# Patient Record
Sex: MALE | Race: WHITE | NOT HISPANIC OR LATINO | Employment: FULL TIME | URBAN - METROPOLITAN AREA
[De-identification: names, ages, dates, MRNs, and addresses within clinical notes are randomized per-mention and may not be internally consistent; named-entity substitution may affect disease eponyms.]

---

## 2017-01-27 DIAGNOSIS — Z00.00 ROUTINE GENERAL MEDICAL EXAMINATION AT A HEALTH CARE FACILITY: Primary | ICD-10-CM

## 2017-03-08 ENCOUNTER — CLINICAL SUPPORT (OUTPATIENT)
Dept: INTERNAL MEDICINE | Facility: CLINIC | Age: 32
End: 2017-03-08

## 2017-03-08 ENCOUNTER — CLINICAL SUPPORT (OUTPATIENT)
Dept: AUDIOLOGY | Facility: CLINIC | Age: 32
End: 2017-03-08
Payer: COMMERCIAL

## 2017-03-08 ENCOUNTER — OFFICE VISIT (OUTPATIENT)
Dept: INTERNAL MEDICINE | Facility: CLINIC | Age: 32
End: 2017-03-08
Payer: COMMERCIAL

## 2017-03-08 ENCOUNTER — CLINICAL SUPPORT (OUTPATIENT)
Dept: CARDIOLOGY | Facility: CLINIC | Age: 32
End: 2017-03-08
Payer: COMMERCIAL

## 2017-03-08 ENCOUNTER — PROCEDURE VISIT (OUTPATIENT)
Dept: PULMONOLOGY | Facility: CLINIC | Age: 32
End: 2017-03-08
Payer: COMMERCIAL

## 2017-03-08 ENCOUNTER — HOSPITAL ENCOUNTER (OUTPATIENT)
Dept: RADIOLOGY | Facility: HOSPITAL | Age: 32
Discharge: HOME | End: 2017-03-08
Attending: FAMILY MEDICINE
Payer: COMMERCIAL

## 2017-03-08 VITALS
TEMPERATURE: 98 F | WEIGHT: 253.5 LBS | RESPIRATION RATE: 18 BRPM | DIASTOLIC BLOOD PRESSURE: 68 MMHG | HEART RATE: 78 BPM | SYSTOLIC BLOOD PRESSURE: 122 MMHG | BODY MASS INDEX: 34.34 KG/M2 | HEIGHT: 72 IN

## 2017-03-08 VITALS
WEIGHT: 253 LBS | HEART RATE: 78 BPM | SYSTOLIC BLOOD PRESSURE: 122 MMHG | BODY MASS INDEX: 34.27 KG/M2 | RESPIRATION RATE: 18 BRPM | DIASTOLIC BLOOD PRESSURE: 68 MMHG | HEIGHT: 72 IN

## 2017-03-08 DIAGNOSIS — Z00.00 ROUTINE GENERAL MEDICAL EXAMINATION AT A HEALTH CARE FACILITY: ICD-10-CM

## 2017-03-08 DIAGNOSIS — Z00.00 ROUTINE GENERAL MEDICAL EXAMINATION AT A HEALTH CARE FACILITY: Primary | ICD-10-CM

## 2017-03-08 DIAGNOSIS — Z23 NEED FOR PNEUMOCOCCAL VACCINATION: ICD-10-CM

## 2017-03-08 DIAGNOSIS — Z01.12 HEARING CONSERVATION AND TREATMENT EXAM: Primary | ICD-10-CM

## 2017-03-08 DIAGNOSIS — Z23 NEED FOR TDAP VACCINATION: ICD-10-CM

## 2017-03-08 DIAGNOSIS — E66.9 OBESITY (BMI 30.0-34.9): ICD-10-CM

## 2017-03-08 PROBLEM — E66.811 OBESITY (BMI 30.0-34.9): Status: ACTIVE | Noted: 2017-03-08

## 2017-03-08 LAB
ALBUMIN SERPL BCP-MCNC: 4 G/DL
ALP SERPL-CCNC: 95 U/L
ALT SERPL W/O P-5'-P-CCNC: 35 U/L
ANION GAP SERPL CALC-SCNC: 9 MMOL/L
AST SERPL-CCNC: 20 U/L
BILIRUB SERPL-MCNC: 1.5 MG/DL
BILIRUB UR QL STRIP: NEGATIVE
BUN SERPL-MCNC: 15 MG/DL
CALCIUM SERPL-MCNC: 9.6 MG/DL
CHLORIDE SERPL-SCNC: 105 MMOL/L
CHOLEST/HDLC SERPL: 6.4 {RATIO}
CLARITY UR: CLEAR
CO2 SERPL-SCNC: 26 MMOL/L
COLOR UR: YELLOW
CREAT SERPL-MCNC: 1 MG/DL
ERYTHROCYTE [DISTWIDTH] IN BLOOD BY AUTOMATED COUNT: 13.5 %
EST. GFR  (AFRICAN AMERICAN): >60 ML/MIN/1.73 M^2
EST. GFR  (NON AFRICAN AMERICAN): >60 ML/MIN/1.73 M^2
GLUCOSE SERPL-MCNC: 91 MG/DL
GLUCOSE UR QL STRIP: NEGATIVE
HCT VFR BLD AUTO: 46.6 %
HDL/CHOLESTEROL RATIO: 15.6 %
HDLC SERPL-MCNC: 173 MG/DL
HDLC SERPL-MCNC: 27 MG/DL
HGB BLD-MCNC: 16 G/DL
HGB UR QL STRIP: NEGATIVE
KETONES UR QL STRIP: NEGATIVE
LDLC SERPL CALC-MCNC: 95 MG/DL
LEUKOCYTE ESTERASE UR QL STRIP: NEGATIVE
MCH RBC QN AUTO: 29.3 PG
MCHC RBC AUTO-ENTMCNC: 34.3 %
MCV RBC AUTO: 85 FL
NITRITE UR QL STRIP: NEGATIVE
NONHDLC SERPL-MCNC: 146 MG/DL
PH UR STRIP: 7 [PH] (ref 5–8)
PLATELET # BLD AUTO: 249 K/UL
PMV BLD AUTO: 9.3 FL
POTASSIUM SERPL-SCNC: 4.1 MMOL/L
PRE FEF 25 75: 4.25 L/S (ref 3.87–5.56)
PRE FET 100: 8.2 S
PRE FEV1 FVC: 82 %
PRE FEV1: 4.21 L (ref 4.34–5.17)
PRE FIF 50: 3.61 L/S
PRE FVC: 5.12 L (ref 5.35–6.34)
PRE PEF: 8.52 L/S (ref 9.49–11.94)
PREDICTED FEV1 FVC: 82.07 % (ref 77.24–86.91)
PREDICTED FEV1: 4.75 L (ref 4.34–5.17)
PREDICTED FVC: 5.84 L (ref 5.35–6.34)
PROT SERPL-MCNC: 7.8 G/DL
PROT UR QL STRIP: NEGATIVE
PROVOCATION PROTOCOL: ABNORMAL
RBC # BLD AUTO: 5.46 M/UL
SODIUM SERPL-SCNC: 140 MMOL/L
SP GR UR STRIP: 1.01 (ref 1–1.03)
TRIGL SERPL-MCNC: 255 MG/DL
URN SPEC COLLECT METH UR: NORMAL
WBC # BLD AUTO: 5.57 K/UL

## 2017-03-08 PROCEDURE — 90715 TDAP VACCINE 7 YRS/> IM: CPT | Mod: PBBFAC,PO | Performed by: FAMILY MEDICINE

## 2017-03-08 PROCEDURE — 83655 ASSAY OF LEAD: CPT

## 2017-03-08 PROCEDURE — 92552 PURE TONE AUDIOMETRY AIR: CPT | Mod: PBBFAC,PO | Performed by: AUDIOLOGIST

## 2017-03-08 PROCEDURE — 93005 ELECTROCARDIOGRAM TRACING: CPT | Mod: PBBFAC,PO | Performed by: INTERNAL MEDICINE

## 2017-03-08 PROCEDURE — 85027 COMPLETE CBC AUTOMATED: CPT | Mod: PO

## 2017-03-08 PROCEDURE — 86703 HIV-1/HIV-2 1 RESULT ANTBDY: CPT

## 2017-03-08 PROCEDURE — 80053 COMPREHEN METABOLIC PANEL: CPT | Mod: PO

## 2017-03-08 PROCEDURE — 71020 XR CHEST PA AND LATERAL: CPT | Mod: TC,PO

## 2017-03-08 PROCEDURE — 94010 BREATHING CAPACITY TEST: CPT | Mod: PBBFAC,PO

## 2017-03-08 PROCEDURE — 90472 IMMUNIZATION ADMIN EACH ADD: CPT | Mod: PBBFAC,PO | Performed by: FAMILY MEDICINE

## 2017-03-08 PROCEDURE — 93010 ELECTROCARDIOGRAM REPORT: CPT | Mod: S$PBB,,, | Performed by: INTERNAL MEDICINE

## 2017-03-08 PROCEDURE — 81003 URINALYSIS AUTO W/O SCOPE: CPT | Mod: PO

## 2017-03-08 PROCEDURE — 80061 LIPID PANEL: CPT | Mod: PO

## 2017-03-08 PROCEDURE — 99999 PR PBB SHADOW E&M-EST. PATIENT-LVL III: CPT | Mod: PBBFAC,,, | Performed by: FAMILY MEDICINE

## 2017-03-08 PROCEDURE — 90471 IMMUNIZATION ADMIN: CPT | Mod: PBBFAC,PO | Performed by: FAMILY MEDICINE

## 2017-03-08 PROCEDURE — 71020 XR CHEST PA AND LATERAL: CPT | Mod: 26,,, | Performed by: RADIOLOGY

## 2017-03-08 PROCEDURE — 94010 BREATHING CAPACITY TEST: CPT | Mod: 26,S$PBB,, | Performed by: INTERNAL MEDICINE

## 2017-03-08 PROCEDURE — 83036 HEMOGLOBIN GLYCOSYLATED A1C: CPT

## 2017-03-08 PROCEDURE — 99395 PREV VISIT EST AGE 18-39: CPT | Mod: S$PBB,,, | Performed by: FAMILY MEDICINE

## 2017-03-08 NOTE — PROGRESS NOTES
Subjective:   Patient ID: Nato Ng is a 31 y.o. male.  Chief Complaint:  Executive Health    HPI Comments: Presents for executive health evaluation #1.  No PCP.  No subcutaneous past medical history.  No present medications.  ALLERGIC to penicillin caused a rash.  Surgical history for repair of right distal leg/ankle fracture.  Social history for 1 pack per day smoking, no alcohol use, no illicit drug use.  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  Single.  Family history father with multiple skin cancers, not melanoma.  Mother healthy.  Specifically no prostate or colon cancer.  Routine health maintenance with last tetanus 1998.  No flu vaccination.  No pneumonia vaccination.  Complains of sleep difficulty, not adequate.  Fatigued during the day.  Over-the-counter sleep aids no help.  Question will Ambien in past effective.  No previous YUE workup.  Once to quit smoking, but intolerant of Wellbutrin previously.    Review of Systems   Constitutional: Positive for fatigue. Negative for chills and fever.   HENT: Negative for congestion, ear pain, postnasal drip, sinus pressure and sore throat.    Eyes: Negative for visual disturbance.   Respiratory: Negative for cough, chest tightness, shortness of breath and wheezing.    Cardiovascular: Negative for chest pain, palpitations and leg swelling.   Gastrointestinal: Negative for abdominal pain, blood in stool, constipation, diarrhea, nausea and vomiting.   Endocrine: Negative for polydipsia, polyphagia and polyuria.   Genitourinary: Negative for difficulty urinating, dysuria, flank pain, frequency, hematuria and urgency.   Musculoskeletal: Negative for myalgias.   Skin: Negative for rash.   Neurological: Negative for dizziness, weakness, light-headedness and headaches.   Hematological: Negative for adenopathy.   Psychiatric/Behavioral: Positive for sleep disturbance. Negative for agitation, decreased concentration and dysphoric mood. The patient is not nervous/anxious.       Objective:   /68  Pulse 78  Temp 97.8 °F (36.6 °C) (Tympanic)   Resp 18  Ht 6' (1.829 m)  Wt 115 kg (253 lb 8.5 oz)  BMI 34.38 kg/m2     Physical Exam   Constitutional: He is oriented to person, place, and time. He appears well-developed and well-nourished.   BP controlled but obese   HENT:   Right Ear: Hearing, tympanic membrane, external ear and ear canal normal.   Left Ear: Hearing, tympanic membrane, external ear and ear canal normal.   Nose: Nose normal. Right sinus exhibits no maxillary sinus tenderness and no frontal sinus tenderness. Left sinus exhibits no maxillary sinus tenderness and no frontal sinus tenderness.   Mouth/Throat: Uvula is midline, oropharynx is clear and moist and mucous membranes are normal.   Eyes: Conjunctivae are normal. Right eye exhibits no discharge. Left eye exhibits no discharge. Right conjunctiva is not injected. Left conjunctiva is not injected. No scleral icterus.   Neck: Normal range of motion and full passive range of motion without pain. Neck supple. No JVD present. No spinous process tenderness and no muscular tenderness present. Normal range of motion present. No thyroid mass and no thyromegaly present.   Cardiovascular: Normal rate, regular rhythm, normal heart sounds and intact distal pulses.  Exam reveals no gallop and no friction rub.    No murmur heard.  Pulses:       Radial pulses are 2+ on the right side, and 2+ on the left side.   Pulmonary/Chest: Effort normal and breath sounds normal. He has no wheezes. He has no rhonchi. He has no rales.   Abdominal: Soft. He exhibits no distension. There is no tenderness. There is no rebound, no guarding and no CVA tenderness.   Musculoskeletal: He exhibits no edema.        Lumbar back: Normal. He exhibits normal range of motion, no pain and no spasm.   Lymphadenopathy:     He has no cervical adenopathy.   Neurological: He is alert and oriented to person, place, and time.   Skin: Skin is warm and dry. No rash  noted.   Psychiatric: He has a normal mood and affect.   Nursing note and vitals reviewed.    EKG normal sinus rhythm, normal tracing.  No previous tracing for comparison  Chest x-ray normal without any acute pulmonary process.  Audiology exam past  Pulmonary PFTs normal    Assessment:     1. Routine general medical examination at a health care facility    2. Need for Tdap vaccination    3. Need for pneumococcal vaccination    4. Obesity (BMI 30.0-34.9)      Plan:   Routine general medical examination at a health care facility  -     Tdap Vaccine  -     Pneumococcal Polysaccharide Vaccine (23 Valent) (SQ/IM)  Send a full executive health letter and summary when all lab results are available  Advised nicotine replacement for smoking cessation as needed.  No shin 6 with previous intolerance of Wellbutrin.  Advised sleep study for possible YUE based on sleep difficulty and increased body mass index.    Obesity (BMI 30.0-34.9)  Discussed increased BMI, Increased health risks, Need for weight loss, Lifestyle modifications.    Return to clinic one year or sooner as needed.

## 2017-03-08 NOTE — MR AVS SNAPSHOT
[facility] - Internal Medicine  [address] [facility] [address] [place] [address]  Phone: [phone]  Fax: [phone]                  Nato Ng   3/8/2017 10:00 AM   Office Visit    Description:  Male : 1985   Provider:  Quinton Carrasco MD   Department:  [facility] - Internal Medicine           Reason for Visit     Executive Health           Diagnoses this Visit        Comments    Routine general medical examination at a health care facility    -  Primary     Need for Tdap vaccination         Need for pneumococcal vaccination                To Do List           Goals (5 Years of Data)     None      Follow-Up and Disposition     Return in about 1 year (around 3/8/2018) for For Annual Exam.      [facility] On Call     [facility] On Call Nurse [facility] -  Assistance  Registered nurses in the [facility] On Call Center provide clinical advisement, health education, appointment booking, and other advisory services.  Call for this free service at [phone].             Medications                Verify that the below list of medications is an accurate representation of the medications you are currently taking.  If none reported, the list may be blank. If incorrect, please contact your healthcare provider. Carry this list with you in case of emergency.                Clinical Reference Information           Your Vitals Were     BP Pulse Temp Resp Height Weight    122/68 78 97.8 °F (36.6 °C) (Tympanic) 18 6' (1.829 m) 115 kg (253 lb 8.5 oz)    BMI                34.38 kg/m2          Blood Pressure          Most Recent Value    BP  122/68      Allergies as of 3/8/2017     Penicillins      Immunizations Administered on Date of Encounter - 3/8/2017     Name Date Dose VIS Date Route    Pneumococcal Polysaccharide - 23 Valent  Incomplete 0.5 mL 2015 Intramuscular    TDAP  Incomplete 0.5 mL 2015 Intramuscular      Orders Placed During Today's Visit      Normal Orders This Visit    Pneumococcal Polysaccharide Vaccine  (23 Valent) (SQ/IM)     Tdap Vaccine       [facility] Sign-Up     Activating your [facility] account is as easy as 1-2-3!     1) Visit [email or web address], select Sign Up Now, enter this activation code and your date of birth, then select Next.  [email or web address]  Expires: 4/21/2017 12:44 PM      2) Create a username and password to use when you visit [facility] in the future and select a security question in case you lose your password and select Next.    3) Enter your e-mail address and click Sign Up!    Additional Information  If you have questions, please e-mail [email or web address] or call [phone] to talk to our [facility] staff. Remember, [facility] is NOT to be used for urgent needs. For medical emergencies, dial 911.         Smoking Cessation     If you would like to quit smoking:   You may be eligible for free services if you are a [place] resident and started smoking cigarettes before September 1, 1988.  Call the Smoking Cessation Trust ([facility]) toll free at (493) 384-4537 or ([phone]) 345-4610.   Call 9-982-QUIT-NOW if you do not meet the above criteria.            Language Assistance Services     ATTENTION: Language assistance services are available, free of charge. Please call [phone].      ATENCIÓN: Si habla español, tiene a cedillo disposición servicios gratuitos de asistencia lingüística. Llame al [phone].     CHÚ Ý: N?u b?n nói Ti?ng Vi?t, có các d?ch v? h? tr? ngôn ng? mi?n phí dành cho b?n. G?i s? [phone].         [facility] complies with applicable Federal civil rights laws and does not discriminate on the basis of race, color, national origin, age, disability, or sex.

## 2017-03-08 NOTE — PROGRESS NOTES
Executive Health Screening    Nato Ng was seen 03/08/2017 for an executive health hearing screen.  Results revealed normal hearing sensitivity 250-8000 Hz, bilaterally.  Otoscopy was within normal limits, bilaterally.    Recommendations:  1. Wear Hearing Protective Devices around loud noises

## 2017-03-09 LAB
CITY: NORMAL
COUNTY: NORMAL
ESTIMATED AVG GLUCOSE: 114 MG/DL
GUARDIAN FIRST NAME: NORMAL
GUARDIAN LAST NAME: NORMAL
HBA1C MFR BLD HPLC: 5.6 %
HIV 1+2 AB+HIV1 P24 AG SERPL QL IA: NEGATIVE
LEAD BLD-MCNC: <1 MCG/DL (ref 0–4.9)
PHONE #: NORMAL
POSTAL CODE: NORMAL
RACE: NORMAL
SPECIMEN SOURCE: NORMAL
STATE OF RESIDENCE: NORMAL
STREET ADDRESS: NORMAL

## 2017-03-10 ENCOUNTER — TELEPHONE (OUTPATIENT)
Dept: INTERNAL MEDICINE | Facility: CLINIC | Age: 32
End: 2017-03-10

## 2017-03-10 NOTE — TELEPHONE ENCOUNTER
----- Message from Maci Patiño sent at 3/10/2017 12:08 PM CST -----  Contact: [phone]   Returning call,please call @[phone].  Thanks-[facility]

## 2018-01-31 ENCOUNTER — OFFICE VISIT (OUTPATIENT)
Dept: ALLERGY | Facility: CLINIC | Age: 33
End: 2018-01-31
Payer: COMMERCIAL

## 2018-01-31 VITALS
TEMPERATURE: 98 F | HEART RATE: 74 BPM | DIASTOLIC BLOOD PRESSURE: 80 MMHG | HEIGHT: 71 IN | BODY MASS INDEX: 37.16 KG/M2 | SYSTOLIC BLOOD PRESSURE: 130 MMHG | WEIGHT: 265.44 LBS | RESPIRATION RATE: 15 BRPM

## 2018-01-31 DIAGNOSIS — J30.89 CHRONIC NONSEASONAL ALLERGIC RHINITIS DUE TO OTHER ALLERGEN: ICD-10-CM

## 2018-01-31 DIAGNOSIS — J31.0 OTHER CHRONIC RHINITIS: Primary | ICD-10-CM

## 2018-01-31 PROCEDURE — 99204 OFFICE O/P NEW MOD 45 MIN: CPT | Mod: 25,S$GLB,, | Performed by: ALLERGY & IMMUNOLOGY

## 2018-01-31 PROCEDURE — 3008F BODY MASS INDEX DOCD: CPT | Mod: S$GLB,,, | Performed by: ALLERGY & IMMUNOLOGY

## 2018-01-31 PROCEDURE — 95004 PERQ TESTS W/ALRGNC XTRCS: CPT | Mod: S$GLB,,, | Performed by: ALLERGY & IMMUNOLOGY

## 2018-01-31 PROCEDURE — 99999 PR PBB SHADOW E&M-EST. PATIENT-LVL III: CPT | Mod: PBBFAC,,, | Performed by: ALLERGY & IMMUNOLOGY

## 2018-01-31 RX ORDER — AZELASTINE 1 MG/ML
1 SPRAY, METERED NASAL 2 TIMES DAILY
Qty: 30 ML | Refills: 6 | Status: SHIPPED | OUTPATIENT
Start: 2018-01-31 | End: 2018-04-05 | Stop reason: ALTCHOICE

## 2018-01-31 RX ORDER — CROMOLYN SODIUM 5.2 MG/ML
SPRAY, METERED NASAL
Qty: 1 BOTTLE | Refills: 6 | Status: SHIPPED | OUTPATIENT
Start: 2018-01-31 | End: 2018-04-05

## 2018-02-01 NOTE — PROGRESS NOTES
Chief complaint: Concerned about possible dog ALLERGY    This note was dictated using voice recognition software and may contain errors.    History:    He had a 3 PM appointment on Wednesday, January 31, 2018.  Information in his medical record regarding his past medical history family history and social history was reviewed and updated today.  Significant additions if any are as noted below.    He stated that he has at least a 10 year history of seasonal, spring and fall, hayfever symptoms.  He experiences nasal obstruction and posterior rhinorrhea.  Typically he does not experience that much sneezing, itching of the nose or eyes palate and pharynx.    He is concerned that he may be ALLERGIC to dogs.  In the past he was concerned that he might be ALLERGIC to cats and horses or freshly cut grass or hay.    In the past when he is been in environments were some dogs are present he is developed increase in nasal symptoms.  He stated within the past few days he had purchased a dog, a puppy.  He is concerned that his nasal symptoms are worsening since acquiring the dog.  He is able to touch the dog and does not develop any dermatitis hives or itching of the skin.    He does not believe that he is ALLERGIC to any foods.    Past medical history he stated in childhood he had asthma.  Doesn't adult this is not been a problem.  He has no history of nose or sinus surgery or facial fractures.  He has no history of heart liver or kidney thyroid disease hypertension or diabetes.    Family history is negative for hayfever and asthma.    Social history.  He smokes but not on a daily basis.  He lives in a mobile home which has central heating and cooling.  He has carpeting in his bedroom.  He works as a .    Review of systems: See above.  At the time of his appointment this afternoon he is feeling well in general.  He stated in the past he may have taken Singulair.  He stated that after cutting grass he will take Benadryl  and finds it to be helpful.  It may cause some sedation.    Exam:    In general he is in no distress.  He is alert oriented well-developed in good mood and attentive  Gait steady  Skin no rash noted  Head no swelling noted  Eyes sclera white conjunctiva pink  Nose patent no polyps seen  Mouth no swelling of the lips tongue or in the throat noted  Ears not inflamed tympanic membranes not inflamed  Lymph nodes no epitrochlear or cervical lymphadenopathy noted  Neck no thyromegaly or masses noted  Heart regular rhythm no murmurs heard  Lungs clear to auscultation  Extremities no swelling or inflammation of the hands or legs noted    He is interested in pursuing ALLERGY evaluation.  He elected to have immediate hypersensitivity skin tests performed.    ALLERGY testing:    Diagnostic immediate hypersensitivity prick skin tests were performed on the volar forearms.  These tests were personally evaluated and interpreted by myself.  A histamine control was positive.  The saline control was negative.  A modest reaction was noted for cockroach protein.  A very equivocal reaction was possibly noted for dog.  The other 34 allergens tested were negative.  I reviewed the results of the tests with him.    Impression:    #1 ALLERGIC rhinitis  #2 chronic rhinitis  #3 other health concerns as noted in his medical record    Assessment and plan:    Using anatomical teaching models of the nose and had I reviewed anatomy and pathophysiology with him.  We discussed the fact that the nose is an organ.  We discussed ALLERGIC rhinitis.  We discussed nonallergic rhinitis.    He was instructed in the proper technique for using a nasal spray.    Initially I have suggested he evaluate the use of Astelin nasal spray 1 spray in each nasal passage as often as every 12 hours if needed.  Potential side effects were reviewed.    Prior to cutting grass he may evaluate the use of Nasalcrom nasal spray.    At his request a prescription for Astelin was  issued and was transmitted electronically to his pharmacy.  He was given a printed prescription for Nasalcrom.    I requested that he call in approximately 1 week and inform me as to whether or not Astelin was helpful and tolerated.  If Astelin is not found to be helpful I told him Singulair may be evaluated.    I reviewed with him that exposure to smoke could provoke respiratory symptoms.  I told him that smoke exposure induced symptoms may be nonallergic in nature.    I reviewed with him the treatment of choice for animal ALLERGY is avoidance.  He will continue to observe as to whether or not he believes that exposure to his puppy is provoking him to be symptomatic.  He stated that he understands that if he continues to be symptomatic due to his exposure to his dog that he would be willing to find  a new home for the dog.    I recommended that he stop smoking.  I encouraged him to do so.    His appointment was 45 minutes in duration spent entirely in face-to-face contact.  More than 50% of the visit was spent in counseling and coordination of care.  An additional 15 minutes were required for the performance and evaluation of his immediate hypersensitivity skin tests.    He was given the office phone number.  Should he have additional questions or concerns he was instructed to call.

## 2018-03-23 ENCOUNTER — OFFICE VISIT (OUTPATIENT)
Dept: FAMILY MEDICINE | Facility: CLINIC | Age: 33
End: 2018-03-23
Payer: COMMERCIAL

## 2018-03-23 VITALS
SYSTOLIC BLOOD PRESSURE: 124 MMHG | TEMPERATURE: 98 F | HEIGHT: 72 IN | DIASTOLIC BLOOD PRESSURE: 86 MMHG | HEART RATE: 87 BPM | BODY MASS INDEX: 35.95 KG/M2 | OXYGEN SATURATION: 98 % | WEIGHT: 265.44 LBS

## 2018-03-23 DIAGNOSIS — J30.1 CHRONIC SEASONAL ALLERGIC RHINITIS DUE TO POLLEN: ICD-10-CM

## 2018-03-23 DIAGNOSIS — J06.9 UPPER RESPIRATORY TRACT INFECTION, UNSPECIFIED TYPE: Primary | ICD-10-CM

## 2018-03-23 PROCEDURE — 90686 IIV4 VACC NO PRSV 0.5 ML IM: CPT | Mod: S$GLB,,, | Performed by: FAMILY MEDICINE

## 2018-03-23 PROCEDURE — 90471 IMMUNIZATION ADMIN: CPT | Mod: 59,S$GLB,, | Performed by: FAMILY MEDICINE

## 2018-03-23 PROCEDURE — 96372 THER/PROPH/DIAG INJ SC/IM: CPT | Mod: S$GLB,,, | Performed by: FAMILY MEDICINE

## 2018-03-23 PROCEDURE — 99999 PR PBB SHADOW E&M-EST. PATIENT-LVL IV: CPT | Mod: PBBFAC,,, | Performed by: FAMILY MEDICINE

## 2018-03-23 PROCEDURE — 99213 OFFICE O/P EST LOW 20 MIN: CPT | Mod: 25,S$GLB,, | Performed by: FAMILY MEDICINE

## 2018-03-23 RX ORDER — PROMETHAZINE HYDROCHLORIDE AND DEXTROMETHORPHAN HYDROBROMIDE 6.25; 15 MG/5ML; MG/5ML
5 SYRUP ORAL 3 TIMES DAILY PRN
Qty: 118 ML | Refills: 0 | Status: SHIPPED | OUTPATIENT
Start: 2018-03-23 | End: 2018-04-02

## 2018-03-23 RX ORDER — BETAMETHASONE SODIUM PHOSPHATE AND BETAMETHASONE ACETATE 3; 3 MG/ML; MG/ML
6 INJECTION, SUSPENSION INTRA-ARTICULAR; INTRALESIONAL; INTRAMUSCULAR; SOFT TISSUE
Status: COMPLETED | OUTPATIENT
Start: 2018-03-23 | End: 2018-03-23

## 2018-03-23 RX ORDER — METHYLPREDNISOLONE 4 MG/1
4 TABLET ORAL DAILY
Qty: 10 TABLET | Refills: 0 | Status: SHIPPED | OUTPATIENT
Start: 2018-03-23 | End: 2018-04-02

## 2018-03-23 RX ADMIN — BETAMETHASONE SODIUM PHOSPHATE AND BETAMETHASONE ACETATE 6 MG: 3; 3 INJECTION, SUSPENSION INTRA-ARTICULAR; INTRALESIONAL; INTRAMUSCULAR; SOFT TISSUE at 09:03

## 2018-03-23 NOTE — PROGRESS NOTES
"Subjective:       Patient ID: Nato Ng is a 32 y.o. male.    Chief Complaint: Nasal Congestion      HPI Comments:       Current Outpatient Prescriptions:     azelastine (ASTELIN) 137 mcg (0.1 %) nasal spray, 1 spray (137 mcg total) by Nasal route 2 (two) times daily., Disp: 30 mL, Rfl: 6    cromolyn (NASALCHROM) 5.2 mg/spray (4 %) nasal spray, Use as directed prior to allergen exposure, Disp: 1 Bottle, Rfl: 6    methylPREDNISolone (MEDROL) 4 MG Tab, Take 1 tablet (4 mg total) by mouth once daily., Disp: 10 tablet, Rfl: 0    promethazine-dextromethorphan (PROMETHAZINE-DM) 6.25-15 mg/5 mL Syrp, Take 5 mLs by mouth 3 (three) times daily as needed., Disp: 118 mL, Rfl: 0    Current Facility-Administered Medications:     betamethasone acetate-betamethasone sodium phosphate injection 6 mg, 6 mg, Intramuscular, 1 time in Clinic/HOD, Domingo Cohn MD      This my first time seeing this patient, who has a history of ALLERGIC rhinitis and who works outdoors.    Complains of 7-10 day history of postnasal drip, nasal congestion, and dry cough.  No fevers, chills, body aches.  No GI symptoms.  Occasional smoker.  Taking some kind of over-the-counter "congestion medication".  Feels fatigued and "under the weather".  Also had some exposures at work to people with flu and would like a flu vaccine    Saw allergist 1 month ago and was put on Astelin spray, and given a prescription for Nasalcrom if needed.  The former did not seem to help much and he did not try the latter.  Subsequently got sick as described above.      Review of Systems   Constitutional: Positive for fatigue. Negative for activity change, appetite change and fever.   HENT: Positive for congestion, postnasal drip and rhinorrhea. Negative for sore throat.    Respiratory: Positive for cough. Negative for shortness of breath.    Cardiovascular: Negative for chest pain.   Gastrointestinal: Negative for abdominal pain, diarrhea and nausea.   Genitourinary: " Negative for difficulty urinating.   Musculoskeletal: Negative for arthralgias and myalgias.   Neurological: Negative for dizziness and headaches.       Objective:      Vitals:    03/23/18 0848   BP: 124/86   Pulse: 87   Temp: 97.6 °F (36.4 °C)   TempSrc: Tympanic   SpO2: 98%   Weight: 120.4 kg (265 lb 6.9 oz)   Height: 6' (1.829 m)   PainSc: 0-No pain     Physical Exam   Constitutional: He is oriented to person, place, and time. He appears well-developed and well-nourished.  Non-toxic appearance. He appears ill. No distress.   HENT:   Head: Normocephalic.   Right Ear: Tympanic membrane, external ear and ear canal normal.   Left Ear: Tympanic membrane, external ear and ear canal normal.   Nose: Mucosal edema and rhinorrhea present. Right sinus exhibits no maxillary sinus tenderness and no frontal sinus tenderness. Left sinus exhibits no maxillary sinus tenderness and no frontal sinus tenderness.   Mouth/Throat: Mucous membranes are normal. Posterior oropharyngeal edema present. No oropharyngeal exudate or posterior oropharyngeal erythema.   Neck: Neck supple. No thyromegaly present.   Cardiovascular: Normal rate, regular rhythm and normal heart sounds.    No murmur heard.  Pulmonary/Chest: Effort normal and breath sounds normal. He has no wheezes. He has no rales.   Abdominal: Soft. He exhibits no distension.   Musculoskeletal: He exhibits no edema.   Lymphadenopathy:     He has no cervical adenopathy.   Neurological: He is alert and oriented to person, place, and time.   Skin: Skin is warm and dry. He is not diaphoretic.   Psychiatric: He has a normal mood and affect. His behavior is normal. Judgment and thought content normal.   Nursing note and vitals reviewed.      Assessment:       1. Upper respiratory tract infection, unspecified type    2. Chronic seasonal allergic rhinitis due to pollen        Plan:   Upper respiratory tract infection, unspecified type  Comments:  C[facility]estone IM followed by Medrol Dosepak  starting tomorrow.  Promethazine DM for nighttime cough.    Chronic seasonal allergic rhinitis due to pollen    Other orders  -     betamethasone acetate-betamethasone sodium phosphate injection 6 mg; Inject 1 mL (6 mg total) into the muscle one time.  -     methylPREDNISolone (MEDROL) 4 MG Tab; Take 1 tablet (4 mg total) by mouth once daily.  Dispense: 10 tablet; Refill: 0  -     promethazine-dextromethorphan (PROMETHAZINE-DM) 6.25-15 mg/5 mL Syrp; Take 5 mLs by mouth 3 (three) times daily as needed.  Dispense: 118 mL; Refill: 0

## 2018-04-05 ENCOUNTER — LAB VISIT (OUTPATIENT)
Dept: LAB | Facility: HOSPITAL | Age: 33
End: 2018-04-05
Attending: FAMILY MEDICINE
Payer: COMMERCIAL

## 2018-04-05 ENCOUNTER — OFFICE VISIT (OUTPATIENT)
Dept: FAMILY MEDICINE | Facility: CLINIC | Age: 33
End: 2018-04-05
Payer: COMMERCIAL

## 2018-04-05 VITALS
WEIGHT: 248.38 LBS | HEART RATE: 109 BPM | HEIGHT: 72 IN | TEMPERATURE: 97 F | RESPIRATION RATE: 18 BRPM | SYSTOLIC BLOOD PRESSURE: 134 MMHG | DIASTOLIC BLOOD PRESSURE: 72 MMHG | BODY MASS INDEX: 33.64 KG/M2 | OXYGEN SATURATION: 97 %

## 2018-04-05 DIAGNOSIS — R53.83 FATIGUE, UNSPECIFIED TYPE: ICD-10-CM

## 2018-04-05 DIAGNOSIS — R63.4 LOSS OF WEIGHT: ICD-10-CM

## 2018-04-05 DIAGNOSIS — G47.9 SLEEP DISTURBANCE: Primary | ICD-10-CM

## 2018-04-05 LAB — TSH SERPL DL<=0.005 MIU/L-ACNC: 0.75 UIU/ML

## 2018-04-05 PROCEDURE — 99999 PR PBB SHADOW E&M-EST. PATIENT-LVL IV: CPT | Mod: PBBFAC,,, | Performed by: FAMILY MEDICINE

## 2018-04-05 PROCEDURE — 84443 ASSAY THYROID STIM HORMONE: CPT

## 2018-04-05 PROCEDURE — 36415 COLL VENOUS BLD VENIPUNCTURE: CPT | Mod: PO

## 2018-04-05 PROCEDURE — 99214 OFFICE O/P EST MOD 30 MIN: CPT | Mod: S$GLB,,, | Performed by: FAMILY MEDICINE

## 2018-04-05 PROCEDURE — 84403 ASSAY OF TOTAL TESTOSTERONE: CPT

## 2018-04-05 NOTE — PROGRESS NOTES
"Subjective:       Patient ID: Nato Ng is a 32 y.o. male.    Chief Complaint: Fatigue (x 2 month)      HPI Comments:     No current outpatient prescriptions on file.      Nato presents with concerns of feeling "drained" and having "no drive" for over a year.  His sleeping is very poor with getting only 2-3 hours a night just because of restlessness/sleeplessness.  He denies any witnesses complaining of his breathing pattern or gasping.  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  Years ago, tried Ambien and it really helped.  Denies depressed mood although he has been irritable at times, but this has improved greatly in the last 2-3 weeks ( see below).  Never been diagnosed or treated for depression.  Diminished sex drive.  No erectile dysfunction.    Notable recent change is that he's lost about 17 pounds in the last 2 weeks according to our records, this while exercising every day to every other day with a good deal of cardio workout.  Also has been eating salads and other low-fat/low-carb dishes.  Has felt a boost in his mood during this time, but not in energy.  All of the above over the last 2 or 3 weeks only      Fatigue   This is a chronic problem. The current episode started more than 1 year ago. The problem occurs daily. The problem has been unchanged. Associated symptoms include fatigue. Pertinent negatives include no abdominal pain, anorexia, arthralgias, change in bowel habit, chest pain, chills, congestion, coughing, diaphoresis, fever, headaches, joint swelling, myalgias, nausea, neck pain, numbness, rash, sore throat, swollen glands, urinary symptoms, vertigo, visual change, vomiting or weakness. Nothing aggravates the symptoms. He has tried nothing for the symptoms.     Review of Systems   Constitutional: Positive for fatigue. Negative for activity change, appetite change, chills, diaphoresis and fever.   HENT: Negative for congestion and sore throat.  "   Respiratory: Negative for cough and shortness of breath.    Cardiovascular: Negative for chest pain.   Gastrointestinal: Negative for abdominal pain, anorexia, change in bowel habit, diarrhea, nausea and vomiting.   Genitourinary: Negative for difficulty urinating.   Musculoskeletal: Negative for arthralgias, joint swelling, myalgias and neck pain.   Skin: Negative for rash.   Neurological: Negative for dizziness, vertigo, weakness, numbness and headaches.   Psychiatric/Behavioral: Positive for agitation and sleep disturbance. Negative for dysphoric mood, self-injury and suicidal ideas. The patient is not nervous/anxious.        Objective:      Vitals:    04/05/18 1137   BP: 134/72   Pulse: 109   Resp: 18   Temp: 97.4 °F (36.3 °C)   TempSrc: Tympanic   SpO2: 97%   Weight: 112.6 kg (248 lb 5.6 oz)   Height: 6' (1.829 m)   PainSc: 0-No pain     Physical Exam   Constitutional: He is oriented to person, place, and time. He appears well-developed and well-nourished. No distress.   17 pound weight loss in about 2 weeks per our record   HENT:   Head: Normocephalic.   Neck: Neck supple. No thyromegaly present.   Cardiovascular: Normal rate, regular rhythm and normal heart sounds.    No murmur heard.  Pulmonary/Chest: Effort normal and breath sounds normal. He has no wheezes. He has no rales.   Abdominal: Soft. He exhibits no distension.   Musculoskeletal: He exhibits no edema.   Lymphadenopathy:     He has no cervical adenopathy.   Neurological: He is alert and oriented to person, place, and time.   Skin: Skin is warm and dry. He is not diaphoretic.   Psychiatric: He has a normal mood and affect. His behavior is normal. Judgment and thought content normal.   Nursing note and vitals reviewed.      Assessment:       1. Sleep disturbance    2. Fatigue, unspecified type    3. Loss of weight        Plan:   Sleep disturbance  Comments:  Referral  for sleep evaluation  Orders:  -     Ambulatory consult to Pulmonology    Fatigue,  unspecified type  Comments:  TSH, testosterone today.  CBC and CMP were normal last year.  Further evaluation after sleep workup is complete  Orders:  -     TSH; Future; Expected date: 04/05/2018  -     Testosterone; Future; Expected date: 04/05/2018    Loss of weight  Comments:  Intentional but very dramatic in a short period of time.  Does not seem to be contributing to either mood problems, nor has it exacerbated his fatigue

## 2018-04-06 ENCOUNTER — OFFICE VISIT (OUTPATIENT)
Dept: PULMONOLOGY | Facility: CLINIC | Age: 33
End: 2018-04-06
Payer: COMMERCIAL

## 2018-04-06 VITALS
HEART RATE: 88 BPM | DIASTOLIC BLOOD PRESSURE: 84 MMHG | OXYGEN SATURATION: 98 % | BODY MASS INDEX: 33.41 KG/M2 | HEIGHT: 72 IN | WEIGHT: 246.69 LBS | RESPIRATION RATE: 18 BRPM | SYSTOLIC BLOOD PRESSURE: 126 MMHG

## 2018-04-06 DIAGNOSIS — G47.00 FREQUENT NOCTURNAL AWAKENING: Primary | ICD-10-CM

## 2018-04-06 DIAGNOSIS — G47.00 INSOMNIA, UNSPECIFIED TYPE: ICD-10-CM

## 2018-04-06 DIAGNOSIS — E66.9 OBESITY (BMI 30.0-34.9): ICD-10-CM

## 2018-04-06 DIAGNOSIS — R53.83 FATIGUE, UNSPECIFIED TYPE: ICD-10-CM

## 2018-04-06 LAB — TESTOST SERPL-MCNC: 625 NG/DL

## 2018-04-06 PROCEDURE — 99214 OFFICE O/P EST MOD 30 MIN: CPT | Mod: S$GLB,,, | Performed by: NURSE PRACTITIONER

## 2018-04-06 PROCEDURE — 99999 PR PBB SHADOW E&M-EST. PATIENT-LVL III: CPT | Mod: PBBFAC,,, | Performed by: NURSE PRACTITIONER

## 2018-04-06 NOTE — LETTER
April 6, 2018      Domingo Cohn MD  [address] [facility] [address]           [facility]  [address] [place] [address]  Phone: [phone]  Fax: [phone]          Patient: Nato Ng   MR Number: [id number]   YOB: 1985   Date of Visit: 4/6/2018       Dear Dr. Domingo Cohn:    Thank you for referring Nato Ng to me for evaluation. Attached you will find relevant portions of my assessment and plan of care.    If you have questions, please do not hesitate to call me. I look forward to following Nato Ng along with you.    Sincerely,    Carlene Kilgore, NP    Enclosure  CC:  No Recipients    If you would like to receive this communication electronically, please contact [email or web address] or [phone] to request more information on [employer] Link access.    For providers and/or their staff who would like to refer a patient to [facility], please contact us through our one-stop-shop provider referral line, [facility], at [phone].    If you feel you have received this communication in error or would no longer like to receive these types of communications, please e-mail [email or web address]

## 2018-04-06 NOTE — PATIENT INSTRUCTIONS
Visiting a Sleep Clinic    Your provider has scheduled you for a sleep study.   You should be receiving a phone call from the sleep lab shortly after your study has been approved by your insurance. Please make sure you have your current phone numbers in the [facility] system. If you do not hear from anyone in the next 10 -14 business days, please call the sleep lab at [phone] to schedule your sleep study. The sleep studies are performed at [facility] seven nights a week.  When you are scheduling your sleep study, they will also make you a follow up appointment with your provider. This follow up appointment will be 10-14 days after your sleep study to review the results. If it is noted that you do have sleep apnea on your initial sleep study, you may receive a call back for a second night study with the CPAP before you come back to the office.   Are you worried about having a sleep study? Talk to your healthcare provider if you have any concerns. Learn what to expect at the sleep clinic and try to relax before you come.  Before your study  Your healthcare provider will tell you how to prepare. Ask if you should take your usual medicines. Also:  · Avoid napping.  · Avoid caffeine and alcohol.  · Take a shower and wash your hair (dont use hair conditioner, hair spray, and skin lotions).  · Eat dinner before you come to the sleep clinic. Pack a snack if you need one before bedtime.  · Bring what will make you comfortable, such as your pajamas, robe, slippers, hygiene items, and even your own pillow.  What you can expect  When you arrive at the sleep clinic, you will usually be checked in by a . A specialized sleep technologist will meet you in your room. Then you may change into your nightclothes. Small sensors are placed on your head and body with tape and gel. The sensors are then plugged into a machine that will monitor your sleep. If you need to use a restroom, the sensors can  be unplugged. A camera in your room will record your body movements. The technologist will stay in a nearby room. If you need to talk to him or her, use the intercom.  What a sleep study does  A sleep study monitors all the stages of your sleep. To do this, the following are recorded:  · Eye movements  · Heart rate, brain waves, and muscle activity  · Level of oxygen in your blood  · Breathing and snoring  · Sudden leg or body movements  If you have breathing problems, Continuous Positive Airway Pressure (CPAP) may be used. CPAP is a device that can help you breathe by adding mild air pressure to your airway passages and improve your sleep. It may be used during the second half of your study or on another night.  Getting your results  The technologist can answer some of your questions about the sleep study. But only your healthcare provider can explain the results. He or she will have the report of your sleep study within 1 to 2 weeks. Then your treatment options can be discussed with your healthcare provider, or you may be referred to a sleep disorders specialist.  Date Last Reviewed: 8/9/2015 © 2000-2017 [employer]. [address] [place] [address]. All rights reserved. This information is not intended as a substitute for professional medical care. Always follow your healthcare professional's instructions.        What Are Snoring and Obstructive Sleep Apnea?  If youve ever had a stuffed-up nose, you know the feeling of trying to breathe through a very narrow passageway. This is what happens in your throat when you snore. While you sleep, structures in your throat partially block your air passage, making the passage narrow and hard to breathe through. If the entire passage becomes blocked and you cant breathe at all, you have sleep apnea.      Snoring Obstructive sleep apnea   Snoring  If your throat structures are too large or the muscles relax too much during sleep, the air passage may  be partially blocked. As air from the nose or mouth passes around this blockage, the throat structures vibrate, causing the familiar sound of snoring. At times, this sound can be so loud that snorers wake up others, or even themselves, during the night. Snoring gets worse as more and more of the air passage is blocked.  Obstructive sleep apnea  If the structures completely block the throat, air cant flow to the lungs at all. This is called apnea (meaning no breathing). Since the lungs arent getting fresh air, the brain tells the body to wake up just enough to tighten the muscles and unblock the air passage. With a loud gasp, breathing begins again. This process may be repeated over and over again throughout the night, making your sleep fragmented with a lighter stage of sleep. Even though you do not remember waking up many times during the night to a lighter sleep, you feel tired the next day. The lack of sleep and fresh air can also strain your lungs, heart, and other organs, leading to problems such as high blood pressure, heart attack, or stroke.  Problems in the nose and jaw  Problems in the structure of the nose may obstruct breathing. A crooked (deviated) septum or swollen turbinates can make snoring worse or lead to apnea. Also, a receding jaw may make the tongue sit too far back, so its more likely to block the airway when youre asleep.        Date Last Reviewed: 7/18/2015  © 0020-8162 [employer]. [address] [place] [address], [place] [address]. All rights reserved. This information is not intended as a substitute for professional medical care. Always follow your healthcare professional's instructions.        Continuous Positive Air Pressure (CPAP)     A mask over the nose gently directs air into the throat to keep the airway open.   Continuous positive air pressure (CPAP) uses gentle air pressure to hold the airway open. CPAP is often the most effective treatment for sleep apnea and severe  snoring. It works very well for many people. But keep in mind that it can take several adjustments before the setup is right for you.  How CPAP works  The CPAP machine  is a small portable pump beside the bed. The pump sends air through a hose, which is held over your nose and mouth by a mask. Mild air pressure is gently pushed through your airway. The air pressure nudges sagging tissues aside. This widens the airway so you can breathe better. CPAP may be combined with other kinds of therapy for sleep apnea.  Types of air pressure treatments  There are different types of CPAP. Your doctor or CPAP technician will help you decide which type is best for you:  · Basic CPAP keeps the pressure constant all night long.  · A bilevel device (BiPAP) provides more pressure when you breathe in and less when you breathe out. A BiPAP machine also may be set to provide automatic breaths to maintain breathing if you stop breathing while sleeping.  · An autoCPAP device automatically adjusts pressure throughout the night and in response to changes such as body position, sleep stage, and snoring.  Date Last Reviewed: 8/10/2015  © 2166-6647 [employer]. [address] [place] [address], [place] [address]. All rights reserved. This information is not intended as a substitute for professional medical care. Always follow your healthcare professional's instructions.

## 2018-04-06 NOTE — PROGRESS NOTES
"    Subjective:      Patient ID: Nato Ng is a 32 y.o. male.    Patient Active Problem List   Diagnosis    Obesity (BMI 30.0-34.9)    Insomnia    Fatigue     Problem list has been reviewed.    he has been referred by Domingo Cohn MD for evaluation and management for   Chief Complaint   Patient presents with    Sleep Apnea     Chief Complaint: Sleep Apnea    HPI:  He presents for a sleep evaluation.   Patient has observed feels sleepy and fatigued during the day. Never told he snores.  Patient reports non restful sleep.  He denies morning headache.   He denies day time napping, unable to sleep during day light hours  He denies recent weight gain.  Cardiovascular risk factors: none.  Bed time is 0700 lays down around 7 pm to relax to be to sleep around 1000. Between 7-10 he reads and thinks.   Wake time is 0500 out of bed time. Has been awaking up at 3 am to go to gym 3 days a week, establish this work out routine over past 3 weeks. Has an alarm set for 3am, usually awakens without alarm just before 3 am.   He wants to work out before work. Has to be at work as a  at 6 am. Work is 6am-6am 24hrs. Work is 3 days a week.   He works 3 days a week and off 2 days a week, then off 4 days in a row.   He does have broken sleep while at work if they get a call.   Many years has had difficulty staying asleep since began work as a .   Sleep aids : YES  He has tried Ambien in 2007 and "work great" on his days off for sleep.  He has used otc melatonin 10mg over past 5 years, tylenol pm. Zquil. With benefit.   He has thoughts of otc benadryl being "bad for him"   He questioned Dr. Carrasco related to fatigue and no drive to do anything.  Patient mentioned getting back on Ambien and was reminded of possible side effects and was requested to have sleep evaluation to evaluate for sleep apnea.   Sleep onset is within 2 - 3 Hours. Melatonin did not help with sleep onset, but helped help sleep for 4 hrs.   He " feels he is a light sleeper and will awaken with just turning and variable ability to go back to sleep, if awakens in 1 hour after sleep onset then able to go back to sleep, if awakens within 3-4 hrs of sleep then get up, since hard to go back to sleep.    Sleep maintenance difficulties related to early morning awakening, frequent night time awakening, difficulty falling asleep and non-restful sleep  Wake after sleep onset occurs two to three times a night.  Nocturia occurs none.  Dry mouth : YES, occasionally  Sleep walking:  NO  Sleep talking :  NO  Sleep eating: NO  Vivid Dreams :  NO  Cataplexy :  NO    [place] sleepiness score was 0.  Neck circumference is 47 cm. (18.5 inches).  Mallampati score 2    STOP - BANG Questionnaire:   1. Snoring : Do you snore loudly ?    NO  2. Tired : Do you often feel tired, fatigued, or sleepy during daytime?   YES  Tired, not sleepy  3. Observed: Has anyone observed you stop breathing during your sleep?    NO  4. Blood pressure : Do you have or are you being treated for high blood pressure?   NO  5. BMI :BMI more than 35 kg/m2?   NO  6. Age : Age over 50 yr old?   NO  7. Neck circumference: Neck circumference greater than 40 cm?    YES  8. Gender: Gender male?    YES    SCORE: 3    High risk of YUE: Yes 5 - 8  Intermediate risk of YUE: Yes 3 - 4  Low risk of YUE: Yes 0 - 2    Previous Report Reviewed: lab reports and office notes     Past Medical History: The following portions of the patient's history were reviewed and updated as appropriate:   He  has a past surgical history that includes Leg Surgery.  His family history includes Arthritis in his maternal grandfather; Diabetes in his maternal grandfather.  He  reports that he has been smoking Cigarettes.  He started smoking about 9 years ago. He has a 1.50 pack-year smoking history. His smokeless tobacco use includes Snuff. He reports that he does not drink alcohol or use drugs.  He currently has no medications in their  medication list.  He is allergic to penicillins..    Review of Systems   Constitutional: Negative for fever, chills, weight loss, weight gain, activity change, appetite change, fatigue and night sweats.   HENT: Negative for postnasal drip, rhinorrhea, sinus pressure, voice change and congestion.    Eyes: Negative for redness and itching.   Respiratory: Negative for snoring, cough, sputum production, chest tightness, shortness of breath, wheezing, orthopnea, asthma nighttime symptoms, dyspnea on extertion, use of rescue inhaler and somnolence.    Cardiovascular: Negative.  Negative for chest pain, palpitations and leg swelling.   Genitourinary: Negative for difficulty urinating and hematuria.   Endocrine: Negative for cold intolerance and heat intolerance.    Musculoskeletal: Negative for arthralgias, gait problem, joint swelling and myalgias.   Skin: Negative.    Gastrointestinal: Negative for nausea, vomiting, abdominal pain and acid reflux.   Neurological: Negative for dizziness, weakness, light-headedness and headaches.   Hematological: Negative for adenopathy. No excessive bruising.   Psychiatric/Behavioral: Positive for sleep disturbance.   All other systems reviewed and are negative.     Objective:     Physical Exam   Constitutional: He is oriented to person, place, and time. Vital signs are normal. He appears well-developed and well-nourished. He is active and cooperative.  Non-toxic appearance. He does not appear ill. No distress.   HENT:   Head: Normocephalic and atraumatic.   Right Ear: External ear normal.   Left Ear: External ear normal.   Nose: Nose normal.   Mouth/Throat: Oropharynx is clear and moist. No oropharyngeal exudate.   Eyes: Conjunctivae are normal.   Neck: Normal range of motion. Neck supple.   Cardiovascular: Normal rate, regular rhythm, normal heart sounds and intact distal pulses.    Pulmonary/Chest: Effort normal and breath sounds normal.   Abdominal: Soft.   Musculoskeletal: He  exhibits no edema.   Neurological: He is alert and oriented to person, place, and time.   Skin: Skin is warm and dry.   Psychiatric: He has a normal mood and affect. His behavior is normal. Judgment and thought content normal.   Vitals reviewed.    /84 (BP Location: Right arm, Patient Position: Sitting)   Pulse 88   Resp 18   Ht 6' (1.829 m)   Wt 111.9 kg (246 lb 11.1 oz)   SpO2 98%   BMI 33.46 kg/m²     Personal Diagnostic Review  Review of labs, xray's, cardiology reports.     Assessment:     1. Frequent nocturnal awakening    2. Obesity (BMI 30.0-34.9)    3. Insomnia, unspecified type    4. Fatigue, unspecified type      Orders Placed This Encounter   Procedures    Polysomnogram (CPAP will be added if patient meets diagnostic criteria.)     Standing Status:   Future     Standing Expiration Date:   4/6/2019     Plan:     Discussed diagnosis, its evaluation, treatment and usual course. All questions answered.    Problem List Items Addressed This Visit     Fatigue     Shift work disorder, poor sleep hygiene, insomnia suspected  Further evaluation with PSG testing for sleep apnea/rem disorders.          Relevant Orders    Polysomnogram (CPAP will be added if patient meets diagnostic criteria.)    Insomnia     Long standing  PSG testing  2 week sleep diary, bring in on return to clinic            Relevant Orders    Polysomnogram (CPAP will be added if patient meets diagnostic criteria.)    Obesity (BMI 30.0-34.9)     Encouraged calorie reduction and 30 minutes of exercise daily. Discussed impact of obesity on general health.  Patient lost 20 lbs over past 3 weeks with eating proteins and low carbs, no sugar drinks like sweet tea.  Exercise jogging and walking two 2 mile walk/jogs a day. Morning and evening.   Weights at gym 3 days a week.            Relevant Orders    Polysomnogram (CPAP will be added if patient meets diagnostic criteria.)      Other Visit Diagnoses     Frequent nocturnal awakening    -   Primary    Relevant Orders    Polysomnogram (CPAP will be added if patient meets diagnostic criteria.)      frequent awakens, fatigue long standing worse over past year, at risk for sleep apnea proceed with PSG testing.     Follow-up for Sleep Study Follow Up w/review sleep study and 2 week sleep diary .

## 2018-04-06 NOTE — ASSESSMENT & PLAN NOTE
Encouraged calorie reduction and 30 minutes of exercise daily. Discussed impact of obesity on general health.  Patient lost 20 lbs over past 3 weeks with eating proteins and low carbs, no sugar drinks like sweet tea.  Exercise jogging and walking two 2 mile walk/jogs a day. Morning and evening.   Weights at gym 3 days a week.

## 2018-04-07 NOTE — ASSESSMENT & PLAN NOTE
Shift work disorder, poor sleep hygiene, insomnia suspected  Further evaluation with PSG testing for sleep apnea/rem disorders.

## 2018-05-02 ENCOUNTER — OFFICE VISIT (OUTPATIENT)
Dept: FAMILY MEDICINE | Facility: CLINIC | Age: 33
End: 2018-05-02
Payer: COMMERCIAL

## 2018-05-02 VITALS
HEIGHT: 72 IN | WEIGHT: 241.63 LBS | TEMPERATURE: 98 F | OXYGEN SATURATION: 98 % | BODY MASS INDEX: 32.73 KG/M2 | SYSTOLIC BLOOD PRESSURE: 116 MMHG | HEART RATE: 89 BPM | DIASTOLIC BLOOD PRESSURE: 80 MMHG

## 2018-05-02 DIAGNOSIS — M54.42 CHRONIC LEFT-SIDED LOW BACK PAIN WITH LEFT-SIDED SCIATICA: Primary | ICD-10-CM

## 2018-05-02 DIAGNOSIS — R53.83 FATIGUE, UNSPECIFIED TYPE: ICD-10-CM

## 2018-05-02 DIAGNOSIS — G89.29 CHRONIC LEFT-SIDED LOW BACK PAIN WITH LEFT-SIDED SCIATICA: Primary | ICD-10-CM

## 2018-05-02 DIAGNOSIS — E66.9 OBESITY (BMI 30.0-34.9): ICD-10-CM

## 2018-05-02 PROCEDURE — 99214 OFFICE O/P EST MOD 30 MIN: CPT | Mod: S$GLB,,, | Performed by: FAMILY MEDICINE

## 2018-05-02 PROCEDURE — 99999 PR PBB SHADOW E&M-EST. PATIENT-LVL III: CPT | Mod: PBBFAC,,, | Performed by: FAMILY MEDICINE

## 2018-05-02 RX ORDER — PREDNISONE 20 MG/1
20 TABLET ORAL DAILY
Qty: 5 TABLET | Refills: 0 | Status: SHIPPED | OUTPATIENT
Start: 2018-05-02 | End: 2018-05-12

## 2018-05-02 NOTE — PROGRESS NOTES
"Subjective:       Patient ID: Nato Ng is a 33 y.o. male.    Chief Complaint: Back Pain (lower back pain)      HPI Comments:       Current Outpatient Prescriptions:     predniSONE (DELTASONE) 20 MG tablet, Take 1 tablet (20 mg total) by mouth once daily., Disp: 5 tablet, Rfl: 0    He's been having low back pain on the left side for the last month.  Similar to episodes he's had before but they've never lasted this long.    10 years ago he was told he had a bulging disc and saw a specialist who treated him with oral medications.  Since then he's had a couple of flares a year that usually lasts for a few days.  This time it's lasted fairly constantly for month.  Radiates down the left leg to the knee but not beyond.  No loss of bladder or bowel control.  No saddle anesthesia.  Works as a  but his coworkers have been "covering me" to avoid heavy lifting etc.    In addition he's been exercising and losing weight very actively over the last month.  And has been exercising much more than usual.  Since the pain is coming on he has not been exercising at all.  He's lost another 5 pounds since last visit I eating salads etc.    His energy is better because he is sleeping more at night.  Sleep evaluation led to the discovery that he was interrupting his sleep by getting up at 3 AM every day to exercise.  Since she's been sleeping more regularly he's been feeling better.  They plan to do a overnight sleep study soon.    For the pain he's been taking Aleve, 2 tablets once or twice a day without much relief      Review of Systems   Constitutional: Negative for activity change, appetite change and fever.   HENT: Negative for sore throat.    Respiratory: Negative for cough and shortness of breath.    Cardiovascular: Negative for chest pain.   Gastrointestinal: Negative for abdominal pain, diarrhea and nausea.   Genitourinary: Negative for difficulty urinating.   Musculoskeletal: Positive for back pain. Negative for " arthralgias and myalgias.   Neurological: Negative for dizziness and headaches.       Objective:      Vitals:    05/02/18 0856   BP: 116/80   Pulse: 89   Temp: 97.9 °F (36.6 °C)   TempSrc: Tympanic   SpO2: 98%   Weight: 109.6 kg (241 lb 10 oz)   Height: 6' (1.829 m)   PainSc:   8   PainLoc: Back     Physical Exam   Constitutional: He is oriented to person, place, and time. He appears well-developed and well-nourished. No distress.   HENT:   Head: Normocephalic.   Mouth/Throat: No oropharyngeal exudate.   Neck: Neck supple. No thyromegaly present.   Cardiovascular: Normal rate, regular rhythm and normal heart sounds.    No murmur heard.  Pulmonary/Chest: Effort normal and breath sounds normal. He has no wheezes. He has no rales.   Abdominal: Soft. He exhibits no distension.   Musculoskeletal: He exhibits no edema.        Right shoulder: He exhibits decreased range of motion and pain. He exhibits no tenderness, no bony tenderness, no swelling, no deformity, no spasm and normal strength.   Obvious pain with change of position, particular going into and out of the supine position.  Decreased forward flexion to 45°.  Straight leg raise negative bilaterally   Lymphadenopathy:     He has no cervical adenopathy.   Neurological: He is alert and oriented to person, place, and time.   Skin: Skin is warm and dry. He is not diaphoretic.   Psychiatric: He has a normal mood and affect. His behavior is normal. Judgment and thought content normal.   Nursing note and vitals reviewed.      Assessment:       1. Chronic left-sided low back pain with left-sided sciatica    2. Obesity (BMI 30.0-34.9)    3. Fatigue, unspecified type        Plan:   Chronic left-sided low back pain with left-sided sciatica  Comments:  Likely disc-related.  No neurologic red flags.  Pain management referral.  Prednisone ×5 days  Orders:  -     Ambulatory consult to Pain Clinic    Obesity (BMI 30.0-34.9)  Comments:  Continued weight loss through dietary  restriction.    Fatigue, unspecified type  Comments:  Sleep was being affected by his aggressive exercise routine.  Doing better now.  Sleep study scheduled    Other orders  -     predniSONE (DELTASONE) 20 MG tablet; Take 1 tablet (20 mg total) by mouth once daily.  Dispense: 5 tablet; Refill: 0

## 2018-05-03 ENCOUNTER — OFFICE VISIT (OUTPATIENT)
Dept: PAIN MEDICINE | Facility: CLINIC | Age: 33
End: 2018-05-03
Payer: COMMERCIAL

## 2018-05-03 VITALS
RESPIRATION RATE: 18 BRPM | HEART RATE: 100 BPM | HEIGHT: 72 IN | BODY MASS INDEX: 32.64 KG/M2 | WEIGHT: 241 LBS | SYSTOLIC BLOOD PRESSURE: 134 MMHG | DIASTOLIC BLOOD PRESSURE: 84 MMHG

## 2018-05-03 DIAGNOSIS — M54.16 LUMBAR RADICULOPATHY: ICD-10-CM

## 2018-05-03 DIAGNOSIS — M54.42 ACUTE RIGHT-SIDED LOW BACK PAIN WITH LEFT-SIDED SCIATICA: ICD-10-CM

## 2018-05-03 DIAGNOSIS — M47.816 LUMBAR SPONDYLOSIS: Primary | ICD-10-CM

## 2018-05-03 PROCEDURE — 99999 PR PBB SHADOW E&M-EST. PATIENT-LVL III: CPT | Mod: PBBFAC,,, | Performed by: ANESTHESIOLOGY

## 2018-05-03 PROCEDURE — 3008F BODY MASS INDEX DOCD: CPT | Mod: CPTII,S$GLB,, | Performed by: ANESTHESIOLOGY

## 2018-05-03 PROCEDURE — 99204 OFFICE O/P NEW MOD 45 MIN: CPT | Mod: S$GLB,,, | Performed by: ANESTHESIOLOGY

## 2018-05-03 RX ORDER — TIZANIDINE 4 MG/1
4 TABLET ORAL 2 TIMES DAILY PRN
Qty: 60 TABLET | Refills: 0 | Status: SHIPPED | OUTPATIENT
Start: 2018-05-03 | End: 2018-06-02

## 2018-05-03 RX ORDER — GABAPENTIN 300 MG/1
CAPSULE ORAL
Qty: 90 CAPSULE | Refills: 0 | Status: SHIPPED | OUTPATIENT
Start: 2018-05-03 | End: 2018-06-18 | Stop reason: ALTCHOICE

## 2018-05-03 NOTE — LETTER
May 3, 2018      Domingo Cohn MD  [address] [facility] [address]           [facility]  [id number] [place] [address]  Phone: [phone]  Fax: [phone]          Patient: Nato Ng   MR Number: [id number]   YOB: 1985   Date of Visit: 5/3/2018       Dear Dr. Domingo Cohn:    Thank you for referring Nato Ng to me for evaluation. Attached you will find relevant portions of my assessment and plan of care.    If you have questions, please do not hesitate to call me. I look forward to following Nato Ng along with you.    Sincerely,    Mina Raman MD    Enclosure  CC:  No Recipients    If you would like to receive this communication electronically, please contact [email or web address] or [phone] to request more information on [employer] Link access.    For providers and/or their staff who would like to refer a patient to [facility], please contact us through our one-stop-shop provider referral line, Lydia Benitez, at [phone].    If you feel you have received this communication in error or would no longer like to receive these types of communications, please e-mail [email or web address]

## 2018-05-03 NOTE — PROGRESS NOTES
Chief Pain Complaint:  Low-back Pain        History of Present Illness:   Nato Ng is a 33 y.o. male  who is presenting with a chief complaint of Low-back Pain  . The patient began experiencing this problem abruptly, and the pain has been gradually worsening over the past 3 month(s). The pain is described as throbbing, shooting, burning and electrical and is located in the left lumbar spine. Pain is intermittent and lasts hours. The pain radiates to  left leg L3, L4 distribution. The patient rates his pain a 8 out of ten and interferes with activities of daily living a 8 out of ten. Pain is exacerbated by flexion of the lumbar spine, and is improved by rest. Patient reports no prior trauma, no prior spinal surgery     - pertinent negatives: No fever, No chills, No weight loss, No bladder dysfunction, No bowel dysfunction, No saddle anesthesia  - pertinent positives: none    - medications, other therapies tried (physical therapy, injections):     >> NSAIDs, Tylenol and medrol dose pack    >> Has previously undergone Physical Therapy at home home exercises     >> Has NOT previously undergone spinal injection/s      Imaging / Labs / Studies (reviewed on 5/3/2018):      Review of Systems:  CONSTITUTIONAL: patient denies any fever, chills, or weight loss  SKIN: patient denies any rash or itching  RESPIRATORY: patient denies having any shortness of breath  GASTROINTESTINAL: patient denies having any diarrhea, constipation, or bowel incontinence  GENITOURINARY: patient denies having any abnormal bladder function    MUSCULOSKELETAL:  - patient complains of the above noted pain/s (see chief pain complaint)    NEUROLOGICAL:   - pain as above  - strength in Lower extremities is intact, BILATERALLY  - sensation in Lower extremities is intact, BILATERALLY  - patient denies any loss of bowel or bladder control      PSYCHIATRIC: patient denies any change in mood    Other:  All other systems reviewed and are  negative      Physical Exam:  /84 (BP Location: Right arm, Patient Position: Sitting, BP Method: Medium (Automatic))   Pulse 100   Resp 18   Ht 6' (1.829 m)   Wt 109.3 kg (241 lb)   BMI 32.69 kg/m²  (reviewed on 5/3/2018)  General: Alert and oriented, in no apparent distress.  Gait: normal gait.  Skin: No rashes, No discoloration, No obvious lesions  HEENT: Normocephalic, atraumatic. Pupils equal and round.  Cardiovascular: Regular rate and rhythm , no significant peripheral edema present  Respiratory: Without audible wheezing, without use of accessory muscles of respiration.    Musculoskeletal:      Lumbar Spine    - Pain on flexion of lumbar spine Present  - Straight Leg Raise:  Present on Left     - Pain on extension of lumbar spine Absent  - TTP over the lumbar facet joints Absent  - Lumbar facet loading Absent    -Pain on palpation over the SI joint  Absent  - YVAN: Absent      Neuro:    Strength:  LE R/L: HF: 5/5, HE: 5/5, KF: 5/5; KE: 5/5; FE: 5/5; FF: 5/5    Extremity Reflexes: Brisk and symmetric throughout.      Extremity Sensory: Sensation to pinprick and temperature symmetric. Proprioception intact.      Psych:  Mood and affect is appropriate      Assessment:    Nato Ng is a 33 y.o. year old male who is presenting with     Encounter Diagnoses   Name Primary?    Lumbar spondylosis Yes    Lumbar radiculopathy     Acute right-sided low back pain with left-sided sciatica        Plan:    1. Interventional: Schedule patient for Left L3-4, L4-5 TFESI with local.     2. Pharmacologic: Gabapentin 300 mg PO Q hs with up titration. Tizanidine 4 mg PO BID PRN. Tylenol, NSAIDs PRN.     3. Rehabilitative: PT post injection.     4. Diagnostic: Lumbar MRI.    5. Follow up: Follow-up for After Injection.      20 minutes were spent in this encounter with more than 50% of the time used for counseling and review of the plan.  Imaging / studies reviewed, detailed above.  I discussed in detail the risks,  benefits, and alternatives to any and all potential treatment options.  All questions and concerns were fully addressed today in clinic. Medical decision making moderate.    Thank you for the opportunity to assist in the care of this patient.    Best wishes,    Signed:    Mina Raman MD          Disclaimer:  This note may have been prepared using voice recognition software, it may have not been extensively proofed, as such there could be errors within the text such as sound alike errors.

## 2018-05-07 ENCOUNTER — HOSPITAL ENCOUNTER (OUTPATIENT)
Dept: RADIOLOGY | Facility: HOSPITAL | Age: 33
Discharge: HOME | End: 2018-05-07
Attending: ANESTHESIOLOGY
Payer: COMMERCIAL

## 2018-05-07 DIAGNOSIS — M54.42 ACUTE RIGHT-SIDED LOW BACK PAIN WITH LEFT-SIDED SCIATICA: ICD-10-CM

## 2018-05-07 PROCEDURE — 72148 MRI LUMBAR SPINE W/O DYE: CPT | Mod: TC

## 2018-05-10 DIAGNOSIS — Z00.00 ROUTINE GENERAL MEDICAL EXAMINATION AT A HEALTH CARE FACILITY: Primary | ICD-10-CM

## 2018-05-18 ENCOUNTER — PROCEDURE VISIT (OUTPATIENT)
Dept: PULMONOLOGY | Facility: CLINIC | Age: 33
End: 2018-05-18

## 2018-05-18 ENCOUNTER — CLINICAL SUPPORT (OUTPATIENT)
Dept: AUDIOLOGY | Facility: CLINIC | Age: 33
End: 2018-05-18

## 2018-05-18 ENCOUNTER — CLINICAL SUPPORT (OUTPATIENT)
Dept: INTERNAL MEDICINE | Facility: CLINIC | Age: 33
End: 2018-05-18
Payer: COMMERCIAL

## 2018-05-18 ENCOUNTER — OFFICE VISIT (OUTPATIENT)
Dept: INTERNAL MEDICINE | Facility: CLINIC | Age: 33
End: 2018-05-18
Payer: COMMERCIAL

## 2018-05-18 VITALS
HEART RATE: 76 BPM | SYSTOLIC BLOOD PRESSURE: 108 MMHG | WEIGHT: 238.56 LBS | RESPIRATION RATE: 14 BRPM | BODY MASS INDEX: 32.31 KG/M2 | DIASTOLIC BLOOD PRESSURE: 72 MMHG | HEIGHT: 72 IN | TEMPERATURE: 97 F | OXYGEN SATURATION: 98 %

## 2018-05-18 VITALS
SYSTOLIC BLOOD PRESSURE: 108 MMHG | RESPIRATION RATE: 14 BRPM | HEART RATE: 76 BPM | BODY MASS INDEX: 32.31 KG/M2 | DIASTOLIC BLOOD PRESSURE: 72 MMHG | HEIGHT: 72 IN | WEIGHT: 238.56 LBS

## 2018-05-18 DIAGNOSIS — Z00.00 ROUTINE GENERAL MEDICAL EXAMINATION AT A HEALTH CARE FACILITY: ICD-10-CM

## 2018-05-18 DIAGNOSIS — Z01.12 HEARING CONSERVATION AND TREATMENT EXAM: Primary | ICD-10-CM

## 2018-05-18 DIAGNOSIS — E66.9 OBESITY (BMI 30.0-34.9): ICD-10-CM

## 2018-05-18 DIAGNOSIS — Z00.00 ROUTINE GENERAL MEDICAL EXAMINATION AT A HEALTH CARE FACILITY: Primary | ICD-10-CM

## 2018-05-18 LAB
ALBUMIN SERPL BCP-MCNC: 3.7 G/DL
ALP SERPL-CCNC: 80 U/L
ALT SERPL W/O P-5'-P-CCNC: 25 U/L
ANION GAP SERPL CALC-SCNC: 12 MMOL/L
AST SERPL-CCNC: 15 U/L
BILIRUB SERPL-MCNC: 0.7 MG/DL
BILIRUB UR QL STRIP: NEGATIVE
BRPFT: NORMAL
BUN SERPL-MCNC: 11 MG/DL
CALCIUM SERPL-MCNC: 9.6 MG/DL
CHLORIDE SERPL-SCNC: 103 MMOL/L
CHOLEST SERPL-MCNC: 159 MG/DL
CHOLEST/HDLC SERPL: 5.3 {RATIO}
CLARITY UR: CLEAR
CO2 SERPL-SCNC: 23 MMOL/L
COLOR UR: YELLOW
CREAT SERPL-MCNC: 0.9 MG/DL
ERYTHROCYTE [DISTWIDTH] IN BLOOD BY AUTOMATED COUNT: 13.7 %
EST. GFR  (AFRICAN AMERICAN): >60 ML/MIN/1.73 M^2
EST. GFR  (NON AFRICAN AMERICAN): >60 ML/MIN/1.73 M^2
ESTIMATED AVG GLUCOSE: 100 MG/DL
FEF 25 75 LLN: 2.93
FEF 25 75 PRE REF: 75.5 %
FEF 25 75 PRE: 3.58 L/S (ref 2.93–6.55)
FEF 25 75 REF: 4.74
FET100 PRE: 9.78 SEC
FEV1 FVC LLN: 71
FEV1 FVC PRE REF: 92 %
FEV1 FVC PRE: 75.62 % (ref 71.38–92.99)
FEV1 FVC REF: 82
FEV1 LLN: 3.79
FEV1 PRE REF: 91 %
FEV1 PRE: 4.33 L (ref 3.79–5.72)
FEV1 REF: 4.76
FEV6 LLN: 4.77
FEV6 PRE REF: 97.7 %
FEV6 PRE: 5.59 L (ref 4.77–6.69)
FEV6 REF: 5.73
FIF50 PRE: 4.1 L/S
FVC LLN: 4.68
FVC PRE REF: 98.3 %
FVC PRE: 5.72 L (ref 4.68–6.96)
FVC REF: 5.82
GLUCOSE SERPL-MCNC: 88 MG/DL
GLUCOSE UR QL STRIP: NEGATIVE
HBA1C MFR BLD HPLC: 5.1 %
HCT VFR BLD AUTO: 50.2 %
HDLC SERPL-MCNC: 30 MG/DL
HDLC SERPL: 18.9 %
HGB BLD-MCNC: 16.9 G/DL
HGB UR QL STRIP: NEGATIVE
ISOFEF PRE: 3.58 L/S
KETONES UR QL STRIP: NEGATIVE
LDLC SERPL CALC-MCNC: 83 MG/DL
LEUKOCYTE ESTERASE UR QL STRIP: NEGATIVE
MCH RBC QN AUTO: 29.2 PG
MCHC RBC AUTO-ENTMCNC: 33.7 G/DL
MCV RBC AUTO: 87 FL
MVV LLN: 152
MVV REF: 179
NITRITE UR QL STRIP: NEGATIVE
NONHDLC SERPL-MCNC: 129 MG/DL
PEF LLN: 8.27
PEF PRE REF: 85.8 %
PEF PRE: 9.2 L/S (ref 8.27–13.18)
PEF REF: 10.73
PH UR STRIP: 6 [PH] (ref 5–8)
PLATELET # BLD AUTO: 241 K/UL
PMV BLD AUTO: 9.7 FL
POTASSIUM SERPL-SCNC: 4.2 MMOL/L
PROT SERPL-MCNC: 7 G/DL
PROT UR QL STRIP: NEGATIVE
RBC # BLD AUTO: 5.78 M/UL
SODIUM SERPL-SCNC: 138 MMOL/L
SP GR UR STRIP: 1.02 (ref 1–1.03)
TRIGL SERPL-MCNC: 230 MG/DL
URN SPEC COLLECT METH UR: NORMAL
WBC # BLD AUTO: 6.95 K/UL

## 2018-05-18 PROCEDURE — 99999 PR PBB SHADOW E&M-EST. PATIENT-LVL III: CPT | Mod: PBBFAC,,, | Performed by: INTERNAL MEDICINE

## 2018-05-18 PROCEDURE — 83036 HEMOGLOBIN GLYCOSYLATED A1C: CPT

## 2018-05-18 PROCEDURE — 85027 COMPLETE CBC AUTOMATED: CPT | Mod: PO

## 2018-05-18 PROCEDURE — 80053 COMPREHEN METABOLIC PANEL: CPT | Mod: PO

## 2018-05-18 PROCEDURE — 99395 PREV VISIT EST AGE 18-39: CPT | Mod: S$GLB,,, | Performed by: INTERNAL MEDICINE

## 2018-05-18 PROCEDURE — 83655 ASSAY OF LEAD: CPT

## 2018-05-18 PROCEDURE — 81003 URINALYSIS AUTO W/O SCOPE: CPT | Mod: PO

## 2018-05-18 PROCEDURE — 94010 BREATHING CAPACITY TEST: CPT | Mod: ,,, | Performed by: INTERNAL MEDICINE

## 2018-05-18 PROCEDURE — 92552 PURE TONE AUDIOMETRY AIR: CPT | Mod: ,,, | Performed by: AUDIOLOGIST

## 2018-05-18 PROCEDURE — 80061 LIPID PANEL: CPT | Mod: PO

## 2018-05-18 NOTE — PROGRESS NOTES
Executive Health Screening    Nato Ng was seen 05/18/2018 for an executive health hearing screen.  Results revealed normal hearing sensitivity 250-8000 Hz, bilaterally.  Otoscopy was within normal limits, bilaterally.    Recommendations:  1. Wear Hearing Protective Devices around loud noises

## 2018-05-18 NOTE — PROGRESS NOTES
Subjective:      Patient ID: Nato Ng is a 33 y.o. male.    Chief Complaint: Executive Health    On 5/18/18, it was a pleasure to meet you and  perform your initial Executive Health evaluation.   At the time  of this visit, you are 33years old.       Pmh: chronic back pain    Outpatient and Clinic-Administered Medications   tiZANidine (ZANAFLEX) 4 MG tablet 4 mg, 2 times daily PRN       gabapentin (NEURONTIN) 300 MG capsule           YOUR PAST SURGICAL HISTORY includes a complicated   fracture repair of your right leg/ankle.     YOUR KNOWN DRUG ALLERGIES INCLUDE Penicillin, which  caused a rash.     YOUR SOCIAL HISTORY includes smoking one pack per week.   No alcohol use.  No illicit drug use.  Employed by [employer].      YOUR FAMILY HISTORY includes a father with Multiple  skin cancers, but not melanoma and is o/w healthy.  Your mother is  healthy.  Specifically, there is no colon cancer or prostate cancer in  the family.    Prevent: tdap and pneumovax with [facility] 3/8/2017.      Review of Systems   Constitutional: Negative for chills and fever.   HENT: Negative for ear pain and sore throat.    Respiratory: Negative for cough.    Cardiovascular: Negative for chest pain.   Gastrointestinal: Negative for abdominal pain and blood in stool.   Genitourinary: Negative for dysuria and hematuria.   Neurological: Negative for seizures and syncope.     Objective:   /72 (BP Location: Right arm, Patient Position: Sitting)   Pulse 76   Temp 96.9 °F (36.1 °C) (Tympanic)   Resp 14   Ht 6' (1.829 m)   Wt 108.2 kg (238 lb 8.6 oz)   SpO2 98%   BMI 32.35 kg/m²     Physical Exam   Constitutional: He is oriented to person, place, and time. He appears well-developed and well-nourished. No distress.   HENT:   Head: Normocephalic and atraumatic.   Mouth/Throat: Oropharynx is clear and moist.   Eyes: EOM are normal. Pupils are equal, round, and reactive to light.   Neck: Neck supple. No thyromegaly present.    Cardiovascular: Normal rate and regular rhythm.    Pulmonary/Chest: Breath sounds normal. He has no wheezes. He has no rales.   Abdominal: Soft. Bowel sounds are normal. There is no tenderness.   Musculoskeletal: He exhibits no edema.   Lymphadenopathy:     He has no cervical adenopathy.   Neurological: He is alert and oriented to person, place, and time.   Skin: Skin is warm and dry.   Psychiatric: He has a normal mood and affect. His behavior is normal.       Assessment:     1. Routine general medical examination at a health care facility    2. Obesity (BMI 30.0-34.9)      Plan:   Routine general medical examination at a health care facility    Obesity (BMI 30.0-34.9)      Heart healthy diet and reg exercise  HM reviewed  See exec health letter for details.   Lab Frequency Next Occurrence   IR Epidural Transforaminal Inj 1st Vert Lumbar Uni Once 05/03/2018   IR Epidural Transfor Inj Ea Add Lumb Uni Once 05/03/2018       Problem List Items Addressed This Visit        Endocrine    Obesity (BMI 30.0-34.9)      Other Visit Diagnoses     Routine general medical examination at a health care facility    -  Primary          Follow-up if symptoms worsen or fail to improve.

## 2018-05-21 LAB
CITY: NORMAL
COUNTY: NORMAL
GUARDIAN FIRST NAME: NORMAL
GUARDIAN LAST NAME: NORMAL
LEAD BLD-MCNC: <1 MCG/DL (ref 0–4.9)
PHONE #: NORMAL
POSTAL CODE: NORMAL
RACE: NORMAL
SPECIMEN SOURCE: NORMAL
STATE OF RESIDENCE: NORMAL
STREET ADDRESS: NORMAL

## 2018-05-23 ENCOUNTER — HOSPITAL ENCOUNTER (OUTPATIENT)
Dept: RADIOLOGY | Facility: HOSPITAL | Age: 33
Discharge: HOME | End: 2018-05-23
Attending: ANESTHESIOLOGY
Payer: COMMERCIAL

## 2018-05-23 ENCOUNTER — SURGERY (OUTPATIENT)
Age: 33
End: 2018-05-23

## 2018-05-23 DIAGNOSIS — M47.816 LUMBAR SPONDYLOSIS: ICD-10-CM

## 2018-05-23 DIAGNOSIS — M54.16 LUMBAR RADICULOPATHY: ICD-10-CM

## 2018-05-23 PROCEDURE — 64483 NJX AA&/STRD TFRM EPI L/S 1: CPT | Mod: RT

## 2018-05-23 RX ADMIN — DEXAMETHASONE SODIUM PHOSPHATE 20 MG: 10 INJECTION, SOLUTION INTRAMUSCULAR; INTRAVENOUS at 10:05

## 2018-05-23 RX ADMIN — LIDOCAINE HYDROCHLORIDE 2 ML: 20 INJECTION, SOLUTION EPIDURAL; INFILTRATION; INTRACAUDAL; PERINEURAL at 10:05

## 2018-05-23 NOTE — DISCHARGE SUMMARY
[facility]  Discharge Note       Description of Procedure: Right L4-5 Lumbar Transforaminal Epidural Steroid Injection under Fluoroscopic Guidance    Procedure Date: 5/23/2018    Admit Date: 5/23/2018  Discharge Date: 5/23/2018     Attending Physician: Lamine Enriquez   Discharge Provider: Lamine Enriquez    Preoperative Diagnosis: Lumbar Spondylosis, Lumbar Radiculopathy     Postoperative Diagnosis: as above, same as preoperative diagnosis    Discharged Condition: Stable    Hospital Course: Patient was admitted for an outpatient procedure. The procedure was tolerated well with no complications.    Final Diagnoses: Same as principal problem.    Disposition: Home, self-care.    Follow up/Patient Instructions:  Follow-up in clinic in 2-3 weeks.    Medications: No medications were prescribed today. The patient was advised to resume normal medication regimen without change.  Specific information was provided regarding restarting any anticoagulant/s.    Discharge Procedure Orders (must include Diet, Follow-up, Activity):  Light activity for the remainder of the day, resume normal activity tomorrow. Resume normal diet. Follow-up in clinic in 2-3 weeks.

## 2018-05-23 NOTE — OP NOTE
"Procedure: Lumbar Transforaminal Epidural Steroid Injection under Fluoroscopic Guidance (supraneural approach)    Level: L4/5     Side: Right    PROCEDURE DATE: 5/23/2018    Pre-operative Diagnosis: Lumbar Radiculopathy  Post-operative Diagnosis: Lumbar Radiculopathy    Provider: Mina Raman MD  Assistant(s): None    Anesthesia: Local, No Sedation    >> 0 mg of VERSED    >> 0 mcg of FENTANYL     Indication: Low back pain with radiculopathy consistent with distribution of targeted nerve. Symptoms unresponsive to conservative treatments. Fluoroscopy was used to optimize visualization of needle placement and to maximize safety.     Procedure Description / Technique:  The patient was seen and identified in the preoperative area. Risks, benefits, complications, and alternatives were discussed with the patient. The patient agreed to proceed with the procedure and signed the consent. The site and side of the procedure was identified and marked. An IV was not placed for this procedure. The patient was taken to the procedural suite.    The patient was positioned in prone orientation on procedure table and a pillow was placed under the abdomen to reduce lumbar lordosis. A time out was performed prior to any intervention. The procedure, site, side, and allergies were stated and agreed to by all present. The lumbosacral area was widely prepped with ChloraPrep. The procedural site was draped in usual sterile fashion. Vital signs were closely monitored throughout this procedure. Conscious sedation was not used for this procedure.    The target area was visualized under fluoroscopy. The cephalocaudal angle of the fluoroscope was adjusted as to align the vertebral end plates. The fluoroscopic arm was rotated ipsilaterally to an angle of approximately 30 degrees until the "monik dog" outline came into view and the tip of the inferior superior articular process pointed towards the midline, 6:00 position of the above pedicle. A 22 " "gauge 5 inch spinal needle was directed towards the "chin" of the "monik dog" (adjacent to the pars interarticularis and inferior to the pedicle). The needle was advanced until OS was met at the inferior border of the pedicle / pars interface. The needle was adjusted so that it would pass inferior to the osseous border. The fluoroscope was then placed in the lateral position and the needle was slowly advanced until it rested in the posterior 1/3rd of the vertebral foramen. AP fluoroscopy was checked and the needle tip rested at the 6:00 position under the pedicle. No paresthesia was elicited during needle placement. With the needle tip in its final position, gentle aspiration was negative for blood and CSF. Omnipaque 240 (1 to 2 mL) was injected under live fluoroscopy. Microbore tubing was used for injection. There was no pain or paresthesia on injection. The contrast clearly delineated the targeted nerve root on AP fluoroscopy. No vascular uptake was seen. A solution containing 1 mL of 1% PF Lidocaine and 1 mL of Dexamethasone (10 mg/mL) was mixed and 2 mL was injected slowly at each level targeted. There was minimal resistance on injection. No pain or paresthesia was elicited on injection. The stylet was replaced and the needle was withdrawn intact. This procedure was performed for each of the above indicated levels.     Description of Findings: Not applicable    Prosthetic devices, grafts, tissues, or devices implanted: None    Specimen Removed: No    Estimated Blood Loss: minimal    COMPLICATIONS: None    DISPOSITION / PLANS: The patient was transferred to the recovery area in a stable condition for observation. The patient was reexamined prior to discharge. There was no evidence of acute neurologic injury following the procedure.  Patient was discharged from the recovery room after meeting discharge criteria. Home discharge instructions were given to the patient by the staff.  "

## 2018-05-29 ENCOUNTER — PATIENT MESSAGE (OUTPATIENT)
Dept: PAIN MEDICINE | Facility: CLINIC | Age: 33
End: 2018-05-29

## 2018-06-12 ENCOUNTER — OFFICE VISIT (OUTPATIENT)
Dept: PAIN MEDICINE | Facility: CLINIC | Age: 33
End: 2018-06-12
Payer: COMMERCIAL

## 2018-06-12 VITALS
WEIGHT: 238 LBS | RESPIRATION RATE: 18 BRPM | HEIGHT: 72 IN | HEART RATE: 77 BPM | BODY MASS INDEX: 32.23 KG/M2 | DIASTOLIC BLOOD PRESSURE: 79 MMHG | SYSTOLIC BLOOD PRESSURE: 142 MMHG

## 2018-06-12 DIAGNOSIS — M47.816 LUMBAR SPONDYLOSIS: ICD-10-CM

## 2018-06-12 DIAGNOSIS — M54.16 LUMBAR RADICULOPATHY: Primary | ICD-10-CM

## 2018-06-12 PROCEDURE — 99214 OFFICE O/P EST MOD 30 MIN: CPT | Mod: S$GLB,,, | Performed by: ANESTHESIOLOGY

## 2018-06-12 PROCEDURE — 99999 PR PBB SHADOW E&M-EST. PATIENT-LVL III: CPT | Mod: PBBFAC,,, | Performed by: ANESTHESIOLOGY

## 2018-06-12 PROCEDURE — 3008F BODY MASS INDEX DOCD: CPT | Mod: CPTII,S$GLB,, | Performed by: ANESTHESIOLOGY

## 2018-06-12 RX ORDER — GABAPENTIN 300 MG/1
900 CAPSULE ORAL NIGHTLY
Qty: 90 CAPSULE | Refills: 1 | Status: SHIPPED | OUTPATIENT
Start: 2018-06-12 | End: 2020-09-30

## 2018-06-12 RX ORDER — TIZANIDINE 4 MG/1
4 TABLET ORAL 2 TIMES DAILY PRN
Qty: 60 TABLET | Refills: 0 | Status: SHIPPED | OUTPATIENT
Start: 2018-06-12 | End: 2018-07-12

## 2018-06-12 NOTE — PROGRESS NOTES
Chief Pain Complaint:  Low-back Pain    Interval History: The patient was last seen Visit date 5/23. At that time he underwent Right L4-5 Lumbar Transforaminal Epidural Steroid Injection under Fluoroscopic Guidance. The patient reports that  he is/was better following the procedure. Patient had 95% relief of the right side.The changes lasted 4 weeks. The changes have continued through this visit. The patient has the same pain now on the left side.      History of Present Illness:   Nato Ng is a 33 y.o. male  who is presenting with a chief complaint of Low-back Pain  . The patient began experiencing this problem abruptly, and the pain has been gradually worsening over the past 3 month(s). The pain is described as throbbing, shooting, burning and electrical and is located in the left lumbar spine. Pain is intermittent and lasts hours. The pain radiates to  left leg L3, L4 distribution. The patient rates his pain a 8 out of ten and interferes with activities of daily living a 8 out of ten. Pain is exacerbated by flexion of the lumbar spine, and is improved by rest. Patient reports no prior trauma, no prior spinal surgery     - pertinent negatives: No fever, No chills, No weight loss, No bladder dysfunction, No bowel dysfunction, No saddle anesthesia  - pertinent positives: none    - medications, other therapies tried (physical therapy, injections):     >> NSAIDs, Tylenol and medrol dose pack    >> Has previously undergone Physical Therapy at home home exercises     >> Has NOT previously undergone spinal injection/s      Imaging / Labs / Studies (reviewed on 6/12/2018):      Review of Systems:  CONSTITUTIONAL: patient denies any fever, chills, or weight loss  SKIN: patient denies any rash or itching  RESPIRATORY: patient denies having any shortness of breath  GASTROINTESTINAL: patient denies having any diarrhea, constipation, or bowel incontinence  GENITOURINARY: patient denies having any abnormal bladder  function    MUSCULOSKELETAL:  - patient complains of the above noted pain/s (see chief pain complaint)    NEUROLOGICAL:   - pain as above  - strength in Lower extremities is intact, BILATERALLY  - sensation in Lower extremities is intact, BILATERALLY  - patient denies any loss of bowel or bladder control      PSYCHIATRIC: patient denies any change in mood    Other:  All other systems reviewed and are negative      Physical Exam:  BP (!) 142/79 (BP Location: Right arm, Patient Position: Sitting, BP Method: Medium (Automatic))   Pulse 77   Resp 18   Ht 6' (1.829 m)   Wt 108 kg (238 lb)   BMI 32.28 kg/m²  (reviewed on 6/12/2018)  General: Alert and oriented, in no apparent distress.  Gait: normal gait.  Skin: No rashes, No discoloration, No obvious lesions  HEENT: Normocephalic, atraumatic. Pupils equal and round.  Cardiovascular: Regular rate and rhythm , no significant peripheral edema present  Respiratory: Without audible wheezing, without use of accessory muscles of respiration.    Musculoskeletal:      Lumbar Spine    - Pain on flexion of lumbar spine Present  - Straight Leg Raise:  Present on Left     - Pain on extension of lumbar spine Absent  - TTP over the lumbar facet joints Absent  - Lumbar facet loading Absent    -Pain on palpation over the SI joint  Absent  - YVAN: Absent      Neuro:    Strength:  LE R/L: HF: 5/5, HE: 5/5, KF: 5/5; KE: 5/5; FE: 5/5; FF: 5/5    Extremity Reflexes: Brisk and symmetric throughout.      Extremity Sensory: Sensation to pinprick and temperature symmetric. Proprioception intact.      Psych:  Mood and affect is appropriate      Assessment:    Nato Ng is a 33 y.o. year old male who is presenting with     Encounter Diagnoses   Name Primary?    Lumbar spondylosis     Lumbar radiculopathy Yes       Plan:    1. Interventional: Schedule patient for Bilateral  L4-5 TFESI with local.     2. Pharmacologic: Continue Gabapentin 900 mg PO Q hs. Continue Tizanidine 4 mg PO BID PRN.  Tylenol, NSAIDs PRN.     3. Rehabilitative: PT post injection.     4. Diagnostic: Lumbar MRI.    5. Follow up: Follow-up in about 4 weeks (around 7/10/2018).      20 minutes were spent in this encounter with more than 50% of the time used for counseling and review of the plan.  Imaging / studies reviewed, detailed above.  I discussed in detail the risks, benefits, and alternatives to any and all potential treatment options.  All questions and concerns were fully addressed today in clinic. Medical decision making moderate.    Thank you for the opportunity to assist in the care of this patient.    Best wishes,    Signed:    Mina Raman MD          Disclaimer:  This note may have been prepared using voice recognition software, it may have not been extensively proofed, as such there could be errors within the text such as sound alike errors.

## 2018-06-18 ENCOUNTER — OFFICE VISIT (OUTPATIENT)
Dept: FAMILY MEDICINE | Facility: CLINIC | Age: 33
End: 2018-06-18
Payer: COMMERCIAL

## 2018-06-18 VITALS
WEIGHT: 239.31 LBS | DIASTOLIC BLOOD PRESSURE: 70 MMHG | HEART RATE: 85 BPM | SYSTOLIC BLOOD PRESSURE: 118 MMHG | TEMPERATURE: 98 F | BODY MASS INDEX: 32.41 KG/M2 | HEIGHT: 72 IN | OXYGEN SATURATION: 98 %

## 2018-06-18 DIAGNOSIS — J06.9 UPPER RESPIRATORY TRACT INFECTION, UNSPECIFIED TYPE: Primary | ICD-10-CM

## 2018-06-18 DIAGNOSIS — M47.816 LUMBAR SPONDYLOSIS: ICD-10-CM

## 2018-06-18 DIAGNOSIS — E66.9 OBESITY (BMI 30.0-34.9): ICD-10-CM

## 2018-06-18 DIAGNOSIS — G47.00 INSOMNIA, UNSPECIFIED TYPE: ICD-10-CM

## 2018-06-18 PROCEDURE — 99999 PR PBB SHADOW E&M-EST. PATIENT-LVL III: CPT | Mod: PBBFAC,,, | Performed by: FAMILY MEDICINE

## 2018-06-18 PROCEDURE — 3008F BODY MASS INDEX DOCD: CPT | Mod: CPTII,S$GLB,, | Performed by: FAMILY MEDICINE

## 2018-06-18 PROCEDURE — 96372 THER/PROPH/DIAG INJ SC/IM: CPT | Mod: S$GLB,,, | Performed by: FAMILY MEDICINE

## 2018-06-18 PROCEDURE — 99213 OFFICE O/P EST LOW 20 MIN: CPT | Mod: 25,S$GLB,, | Performed by: FAMILY MEDICINE

## 2018-06-18 RX ORDER — PROMETHAZINE HYDROCHLORIDE AND DEXTROMETHORPHAN HYDROBROMIDE 6.25; 15 MG/5ML; MG/5ML
5 SYRUP ORAL 3 TIMES DAILY PRN
Qty: 118 ML | Refills: 0 | Status: SHIPPED | OUTPATIENT
Start: 2018-06-18 | End: 2018-06-28

## 2018-06-18 RX ORDER — BETAMETHASONE SODIUM PHOSPHATE AND BETAMETHASONE ACETATE 3; 3 MG/ML; MG/ML
6 INJECTION, SUSPENSION INTRA-ARTICULAR; INTRALESIONAL; INTRAMUSCULAR; SOFT TISSUE
Status: COMPLETED | OUTPATIENT
Start: 2018-06-18 | End: 2018-06-18

## 2018-06-18 RX ADMIN — BETAMETHASONE SODIUM PHOSPHATE AND BETAMETHASONE ACETATE 6 MG: 3; 3 INJECTION, SUSPENSION INTRA-ARTICULAR; INTRALESIONAL; INTRAMUSCULAR; SOFT TISSUE at 08:06

## 2018-06-18 NOTE — PROGRESS NOTES
Subjective:       Patient ID: Nato Ng is a 33 y.o. male.    Chief Complaint: Sore Throat; Cough; and Chest Congestion      HPI Comments:       Current Outpatient Prescriptions:     gabapentin (NEURONTIN) 300 MG capsule, Take 3 capsules (900 mg total) by mouth every evening. Take 45 min to 1 hour before bed as may cause drowsiness, Disp: 90 capsule, Rfl: 1    tiZANidine (ZANAFLEX) 4 MG tablet, Take 1 tablet (4 mg total) by mouth 2 (two) times daily as needed. (Patient taking differently: Take 4 mg by mouth once daily. ), Disp: 60 tablet, Rfl: 0    promethazine-dextromethorphan (PROMETHAZINE-DM) 6.25-15 mg/5 mL Syrp, Take 5 mLs by mouth 3 (three) times daily as needed., Disp: 118 mL, Rfl: 0    Current Facility-Administered Medications:     betamethasone acetate-betamethasone sodium phosphate injection 6 mg, 6 mg, Intramuscular, 1 time in Clinic/HOD, Domingo Cohn MD      He is here with a 1 week history of nasal congestion and postnasal drip.  No previous symptoms or allergy problems.  Taking Aleve cold and Sinus and Mucinex.  No nasal discharges congestion.  Sputum is clear.  Some cough at night.  No fever or chills or GI symptoms.    Got CATHY on the right which was very helpful.  Plan on getting another 1 left but insurance is not going to cover.  May postpone until 2019.  Was also prescribed gabapentin, tizanidine, and physical therapy.    Has not had to get a sleep study since his sleep has improved by improving his sleep habits.    Weight has stayed the same over the last month.  He has not been able to exercise because of his back.  His diet is so-so.  Plan on getting back some light physical activity.  Feels like he has gained a lot of weight in the last few weeks.      Review of Systems   Constitutional: Negative for activity change, appetite change and fever.   HENT: Positive for congestion and postnasal drip. Negative for sore throat.    Respiratory: Positive for cough. Negative for shortness of  breath and wheezing.    Cardiovascular: Negative for chest pain.   Gastrointestinal: Negative for abdominal pain, diarrhea and nausea.   Genitourinary: Negative for difficulty urinating.   Musculoskeletal: Positive for back pain. Negative for arthralgias and myalgias.   Neurological: Negative for dizziness and headaches.       Objective:      Vitals:    06/18/18 0747   BP: 118/70   Pulse: 85   Temp: 97.9 °F (36.6 °C)   TempSrc: Tympanic   SpO2: 98%   Weight: 108.6 kg (239 lb 5 oz)   Height: 6' (1.829 m)   PainSc:   2   PainLoc: Back     Physical Exam   Constitutional: He is oriented to person, place, and time. He appears well-developed and well-nourished.  Non-toxic appearance. He appears ill. No distress.   HENT:   Head: Normocephalic.   Right Ear: Tympanic membrane, external ear and ear canal normal.   Left Ear: Tympanic membrane, external ear and ear canal normal.   Nose: Mucosal edema and rhinorrhea present. Right sinus exhibits no maxillary sinus tenderness and no frontal sinus tenderness. Left sinus exhibits no maxillary sinus tenderness and no frontal sinus tenderness.   Mouth/Throat: Mucous membranes are normal. Posterior oropharyngeal edema and posterior oropharyngeal erythema present. No oropharyngeal exudate.   Neck: Neck supple. No thyromegaly present.   Cardiovascular: Normal rate, regular rhythm and normal heart sounds.    No murmur heard.  Pulmonary/Chest: Effort normal and breath sounds normal. He has no wheezes. He has no rales.   Abdominal: Soft. He exhibits no distension.   Musculoskeletal: He exhibits no edema.   Lymphadenopathy:     He has no cervical adenopathy.   Neurological: He is alert and oriented to person, place, and time.   Skin: Skin is warm and dry. He is not diaphoretic.   Psychiatric: He has a normal mood and affect. His behavior is normal. Judgment and thought content normal.   Nursing note and vitals reviewed.      Assessment:       1. Upper respiratory tract infection,  unspecified type    2. Obesity (BMI 30.0-34.9)    3. Insomnia, unspecified type    4. Lumbar spondylosis        Plan:   Upper respiratory tract infection, unspecified type  Comments:  Juju on IM; continue Mucinex.  Start Claritin daily.  Promethazine DM for nighttime cough.  Rest and fluids  Orders:  -     betamethasone acetate-betamethasone sodium phosphate injection 6 mg; Inject 1 mL (6 mg total) into the muscle one time.    Obesity (BMI 30.0-34.9)  Comments:  Weight loss has leveled off over the last 4 weeks due to decreased exercise and some dietary indiscretion.    Insomnia, unspecified type  Comments:  Improved with sleep hygiene    Lumbar spondylosis  Comments:  Good response CATHY on 1 side.  Financial considerations impeding  further plans.    Other orders  -     promethazine-dextromethorphan (PROMETHAZINE-DM) 6.25-15 mg/5 mL Syrp; Take 5 mLs by mouth 3 (three) times daily as needed.  Dispense: 118 mL; Refill: 0

## 2018-06-27 ENCOUNTER — OFFICE VISIT (OUTPATIENT)
Dept: FAMILY MEDICINE | Facility: CLINIC | Age: 33
End: 2018-06-27
Payer: COMMERCIAL

## 2018-06-27 VITALS
HEART RATE: 79 BPM | TEMPERATURE: 98 F | DIASTOLIC BLOOD PRESSURE: 80 MMHG | SYSTOLIC BLOOD PRESSURE: 120 MMHG | OXYGEN SATURATION: 97 % | BODY MASS INDEX: 32.41 KG/M2 | WEIGHT: 239.31 LBS | HEIGHT: 72 IN

## 2018-06-27 DIAGNOSIS — J20.9 ACUTE BRONCHITIS, UNSPECIFIED ORGANISM: Primary | ICD-10-CM

## 2018-06-27 DIAGNOSIS — Z72.0 TOBACCO ABUSE: ICD-10-CM

## 2018-06-27 PROCEDURE — 3008F BODY MASS INDEX DOCD: CPT | Mod: CPTII,S$GLB,, | Performed by: FAMILY MEDICINE

## 2018-06-27 PROCEDURE — 99214 OFFICE O/P EST MOD 30 MIN: CPT | Mod: S$GLB,,, | Performed by: FAMILY MEDICINE

## 2018-06-27 PROCEDURE — 99999 PR PBB SHADOW E&M-EST. PATIENT-LVL III: CPT | Mod: PBBFAC,,, | Performed by: FAMILY MEDICINE

## 2018-06-27 RX ORDER — BENZONATATE 200 MG/1
200 CAPSULE ORAL 3 TIMES DAILY PRN
Qty: 20 CAPSULE | Refills: 0 | Status: SHIPPED | OUTPATIENT
Start: 2018-06-27 | End: 2018-07-07

## 2018-06-27 RX ORDER — METHYLPREDNISOLONE 4 MG/1
TABLET ORAL
Qty: 1 PACKAGE | Refills: 0 | Status: SHIPPED | OUTPATIENT
Start: 2018-06-27 | End: 2018-07-18

## 2018-06-27 RX ORDER — DOXYCYCLINE 100 MG/1
100 CAPSULE ORAL 2 TIMES DAILY
Qty: 20 CAPSULE | Refills: 0 | Status: SHIPPED | OUTPATIENT
Start: 2018-06-27 | End: 2018-07-07

## 2018-06-27 NOTE — PROGRESS NOTES
Subjective:       Patient ID: Nato Ng is a 33 y.o. male.    Chief Complaint: Nasal Congestion; Chest Congestion; and Sore Throat      HPI Comments:       Current Outpatient Prescriptions:     gabapentin (NEURONTIN) 300 MG capsule, Take 3 capsules (900 mg total) by mouth every evening. Take 45 min to 1 hour before bed as may cause drowsiness, Disp: 90 capsule, Rfl: 1    promethazine-dextromethorphan (PROMETHAZINE-DM) 6.25-15 mg/5 mL Syrp, Take 5 mLs by mouth 3 (three) times daily as needed., Disp: 118 mL, Rfl: 0    tiZANidine (ZANAFLEX) 4 MG tablet, Take 1 tablet (4 mg total) by mouth 2 (two) times daily as needed. (Patient taking differently: Take 4 mg by mouth once daily. ), Disp: 60 tablet, Rfl: 0    benzonatate (TESSALON) 200 MG capsule, Take 1 capsule (200 mg total) by mouth 3 (three) times daily as needed for Cough., Disp: 20 capsule, Rfl: 0    doxycycline (MONODOX) 100 MG capsule, Take 1 capsule (100 mg total) by mouth 2 (two) times daily. for 10 days, Disp: 20 capsule, Rfl: 0    methylPREDNISolone (MEDROL DOSEPACK) 4 mg tablet, use as directed, Disp: 1 Package, Rfl: 0      Follow-up visit for respiratory illness.  Was seen 9 days ago by me and treated for URI with Celestone, Claritin, promethazine.  Got better for couple of days but now the cough is moved into his chest.  Occasional wheeze that clears with coughing.  No shortness of breath, fever, nasal discharge. Some nasal congestion but most of his symptoms are in his chest.  No GI symptoms.  Sputum is scant and yellow.  Altogether symptoms are now in the 3rd week.      Review of Systems   Constitutional: Negative for activity change, appetite change, chills and fever.   HENT: Positive for congestion and sore throat. Negative for sinus pressure.    Respiratory: Positive for cough. Negative for shortness of breath and wheezing.    Cardiovascular: Negative for chest pain.   Gastrointestinal: Negative for abdominal pain, diarrhea and nausea.    Genitourinary: Negative for difficulty urinating.   Musculoskeletal: Negative for arthralgias and myalgias.   Neurological: Negative for dizziness and headaches.       Objective:      Vitals:    06/27/18 0848   BP: 120/80   Pulse: 79   Temp: 98 °F (36.7 °C)   TempSrc: Tympanic   SpO2: 97%   Weight: 108.6 kg (239 lb 5 oz)   Height: 6' (1.829 m)   PainSc: 0-No pain     Physical Exam   Constitutional: He is oriented to person, place, and time. He appears well-developed and well-nourished.  Non-toxic appearance. He appears ill. No distress.   HENT:   Head: Normocephalic.   Right Ear: Tympanic membrane, external ear and ear canal normal.   Left Ear: Tympanic membrane, external ear and ear canal normal.   Nose: Mucosal edema and rhinorrhea present.   Mouth/Throat: Mucous membranes are normal. Posterior oropharyngeal edema present. No oropharyngeal exudate or posterior oropharyngeal erythema.   Neck: Neck supple. No thyromegaly present.   Cardiovascular: Normal rate, regular rhythm and normal heart sounds.    No murmur heard.  Pulmonary/Chest: Effort normal and breath sounds normal. He has no wheezes. He has no rales.   Abdominal: Soft. He exhibits no distension.   Musculoskeletal: He exhibits no edema.   Lymphadenopathy:     He has no cervical adenopathy.   Neurological: He is alert and oriented to person, place, and time.   Skin: Skin is warm and dry. He is not diaphoretic.   Psychiatric: He has a normal mood and affect. His behavior is normal. Judgment and thought content normal.   Nursing note and vitals reviewed.      Assessment:       1. Acute bronchitis, unspecified organism    2. Tobacco abuse        Plan:   Acute bronchitis, unspecified organism  Comments:  Third week of illness.  Doxycycline, Medrol Dosepak, Tessalon.    Tobacco abuse  Comments:  Encouraged cessation    Other orders  -     methylPREDNISolone (MEDROL DOSEPACK) 4 mg tablet; use as directed  Dispense: 1 Package; Refill: 0  -     doxycycline  (MONODOX) 100 MG capsule; Take 1 capsule (100 mg total) by mouth 2 (two) times daily. for 10 days  Dispense: 20 capsule; Refill: 0  -     benzonatate (TESSALON) 200 MG capsule; Take 1 capsule (200 mg total) by mouth 3 (three) times daily as needed for Cough.  Dispense: 20 capsule; Refill: 0

## 2018-07-02 ENCOUNTER — PATIENT MESSAGE (OUTPATIENT)
Dept: FAMILY MEDICINE | Facility: CLINIC | Age: 33
End: 2018-07-02

## 2018-07-03 ENCOUNTER — HOSPITAL ENCOUNTER (OUTPATIENT)
Dept: RADIOLOGY | Facility: HOSPITAL | Age: 33
Discharge: HOME | End: 2018-07-03
Attending: FAMILY MEDICINE | Admitting: ANESTHESIOLOGY
Payer: COMMERCIAL

## 2018-07-03 DIAGNOSIS — R05.9 COUGH: Primary | ICD-10-CM

## 2018-07-03 DIAGNOSIS — R05.9 COUGH: ICD-10-CM

## 2018-07-03 PROCEDURE — 71046 X-RAY EXAM CHEST 2 VIEWS: CPT | Mod: 26,,, | Performed by: RADIOLOGY

## 2018-07-03 PROCEDURE — 71046 X-RAY EXAM CHEST 2 VIEWS: CPT | Mod: TC,PO

## 2018-07-03 RX ORDER — LEVOFLOXACIN 750 MG/1
750 TABLET ORAL DAILY
Qty: 7 TABLET | Refills: 0 | Status: SHIPPED | OUTPATIENT
Start: 2018-07-03 | End: 2018-10-05

## 2018-07-03 RX ORDER — PROMETHAZINE HYDROCHLORIDE AND DEXTROMETHORPHAN HYDROBROMIDE 6.25; 15 MG/5ML; MG/5ML
5 SYRUP ORAL 3 TIMES DAILY PRN
Qty: 118 ML | Refills: 0 | Status: SHIPPED | OUTPATIENT
Start: 2018-07-03 | End: 2018-07-13

## 2018-07-03 NOTE — TELEPHONE ENCOUNTER
Please notify him thatI have an ordered a chest xray. Has he ever tried tessalon? If not, let me know. Otherwise, . Will call him after CXR and proceed from there

## 2018-10-05 ENCOUNTER — OFFICE VISIT (OUTPATIENT)
Dept: ORTHOPEDICS | Facility: CLINIC | Age: 33
End: 2018-10-05
Payer: COMMERCIAL

## 2018-10-05 ENCOUNTER — OFFICE VISIT (OUTPATIENT)
Dept: FAMILY MEDICINE | Facility: CLINIC | Age: 33
End: 2018-10-05
Payer: COMMERCIAL

## 2018-10-05 ENCOUNTER — HOSPITAL ENCOUNTER (OUTPATIENT)
Dept: RADIOLOGY | Facility: HOSPITAL | Age: 33
Discharge: HOME | End: 2018-10-05
Attending: FAMILY MEDICINE
Payer: COMMERCIAL

## 2018-10-05 ENCOUNTER — TELEPHONE (OUTPATIENT)
Dept: FAMILY MEDICINE | Facility: CLINIC | Age: 33
End: 2018-10-05

## 2018-10-05 VITALS
OXYGEN SATURATION: 98 % | SYSTOLIC BLOOD PRESSURE: 120 MMHG | BODY MASS INDEX: 31.36 KG/M2 | HEIGHT: 72 IN | TEMPERATURE: 96 F | HEART RATE: 91 BPM | WEIGHT: 231.5 LBS | DIASTOLIC BLOOD PRESSURE: 83 MMHG

## 2018-10-05 VITALS
HEART RATE: 98 BPM | DIASTOLIC BLOOD PRESSURE: 86 MMHG | BODY MASS INDEX: 31.36 KG/M2 | HEIGHT: 72 IN | WEIGHT: 231.5 LBS | SYSTOLIC BLOOD PRESSURE: 141 MMHG

## 2018-10-05 DIAGNOSIS — S42.254A CLOSED NONDISPLACED FRACTURE OF GREATER TUBEROSITY OF RIGHT HUMERUS, INITIAL ENCOUNTER: ICD-10-CM

## 2018-10-05 DIAGNOSIS — G89.11 ACUTE PAIN OF RIGHT SHOULDER DUE TO TRAUMA: Primary | ICD-10-CM

## 2018-10-05 DIAGNOSIS — M25.511 ACUTE PAIN OF RIGHT SHOULDER DUE TO TRAUMA: Primary | ICD-10-CM
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
## 2018-10-05 DIAGNOSIS — S92.352A DISPLACED FRACTURE OF FIFTH METATARSAL BONE, LEFT FOOT, INITIAL ENCOUNTER FOR CLOSED FRACTURE: ICD-10-CM

## 2018-10-05 DIAGNOSIS — M25.511 ACUTE PAIN OF RIGHT SHOULDER DUE TO TRAUMA: ICD-10-CM

## 2018-10-05 DIAGNOSIS — M79.671 ACUTE FOOT PAIN, RIGHT: ICD-10-CM

## 2018-10-05 DIAGNOSIS — S92.351A CLOSED DISPLACED FRACTURE OF FIFTH METATARSAL BONE OF RIGHT FOOT, INITIAL ENCOUNTER: ICD-10-CM

## 2018-10-05 DIAGNOSIS — G89.11 ACUTE PAIN OF RIGHT SHOULDER DUE TO TRAUMA: ICD-10-CM

## 2018-10-05 DIAGNOSIS — S42.291A HUMERAL HEAD FRACTURE, RIGHT, CLOSED, INITIAL ENCOUNTER: ICD-10-CM

## 2018-10-05 PROCEDURE — 99999 PR PBB SHADOW E&M-EST. PATIENT-LVL IV: CPT | Mod: PBBFAC,,, | Performed by: FAMILY MEDICINE

## 2018-10-05 PROCEDURE — 73030 X-RAY EXAM OF SHOULDER: CPT | Mod: 26,RT,, | Performed by: RADIOLOGY

## 2018-10-05 PROCEDURE — 99244 OFF/OP CNSLTJ NEW/EST MOD 40: CPT | Mod: 57,S$GLB,, | Performed by: PHYSICIAN ASSISTANT

## 2018-10-05 PROCEDURE — 73030 X-RAY EXAM OF SHOULDER: CPT | Mod: TC,PO,RT

## 2018-10-05 PROCEDURE — 73630 X-RAY EXAM OF FOOT: CPT | Mod: TC,PO,RT

## 2018-10-05 PROCEDURE — 73060 X-RAY EXAM OF HUMERUS: CPT | Mod: TC,PO,RT

## 2018-10-05 PROCEDURE — 73060 X-RAY EXAM OF HUMERUS: CPT | Mod: 26,RT,, | Performed by: RADIOLOGY

## 2018-10-05 PROCEDURE — 23620 CLTX GR HMRL TBRS FX WO MNPJ: CPT | Mod: RT,S$GLB,, | Performed by: PHYSICIAN ASSISTANT

## 2018-10-05 PROCEDURE — 73630 X-RAY EXAM OF FOOT: CPT | Mod: 26,RT,, | Performed by: RADIOLOGY

## 2018-10-05 PROCEDURE — 99999 PR PBB SHADOW E&M-EST. PATIENT-LVL III: CPT | Mod: PBBFAC,,, | Performed by: PHYSICIAN ASSISTANT

## 2018-10-05 PROCEDURE — 28470 CLTX METATARSAL FX WO MNP EA: CPT | Mod: 51,RT,S$GLB, | Performed by: PHYSICIAN ASSISTANT

## 2018-10-05 PROCEDURE — 99214 OFFICE O/P EST MOD 30 MIN: CPT | Mod: S$GLB,,, | Performed by: FAMILY MEDICINE

## 2018-10-05 PROCEDURE — 3008F BODY MASS INDEX DOCD: CPT | Mod: CPTII,S$GLB,, | Performed by: FAMILY MEDICINE

## 2018-10-05 RX ORDER — HYDROCODONE BITARTRATE AND ACETAMINOPHEN 5; 325 MG/1; MG/1
1 TABLET ORAL EVERY 6 HOURS PRN
Qty: 30 TABLET | Refills: 0 | Status: SHIPPED | OUTPATIENT
Start: 2018-10-05 | End: 2018-12-22

## 2018-10-05 NOTE — H&P (VIEW-ONLY)
"Subjective:     Patient ID: Nato Ng is a 33 y.o. male.    Chief Complaint: Pain of the Right Shoulder and Pain of the Right Foot    HPI   The patient is seen today for consultation regarding acute right shoulder and foot pain. He is referred by Dr. Cohn. He fell 12 feet from his fishing camp and landed on his R side, along with his dad landing on his R shoulder.  The patient reports that he was at the top of the stairs on the camp and at the same time, his dad walked up, neither one were paying attention and therefore the top stair 'broke loose' causing them to fall straight down. He felt a pop in his shoulder.  He also has pain of his R foot and ankle. He has experienced swelling in his left ankle but no pain. States his left ankle is "good." The patient was seen by his PCP this morning and referred here afterwards.    He denies any previous issues with his R shoulder and/or foot. The patient has very little active ROM. His ROM has been affected by the fall. He did have a left shoulder strain in the past- 5 years ago but did not have any surgery for it.       His current pain is an 8/10. X-rays were taken today. He is currently taking OTC pain medications and gabapentin for his lower back pain. Dr. Raman has prescribed this for him and has given him injections for his back.     Past Medical History:   Diagnosis Date    Bulging lumbar disc      Past Surgical History:   Procedure Laterality Date    LEG SURGERY       Family History   Problem Relation Age of Onset    Arthritis Maternal Grandfather     Diabetes Maternal Grandfather      Social History     Socioeconomic History    Marital status: Single     Spouse name: Not on file    Number of children: Not on file    Years of education: Not on file    Highest education level: Not on file   Social Needs    Financial resource strain: Not on file    Food insecurity - worry: Not on file    Food insecurity - inability: Not on file    Transportation needs - " medical: Not on file    Transportation needs - non-medical: Not on file   Occupational History     Employer:  [employer]   Tobacco Use    Smoking status: Current Every Day Smoker     Packs/day: 0.50     Years: 3.00     Pack years: 1.50     Types: Cigarettes     Start date: 2009    Smokeless tobacco: Current User     Types: Snuff   Substance and Sexual Activity    Alcohol use: No    Drug use: No    Sexual activity: Yes     Partners: Female     Birth control/protection: None   Other Topics Concern    Not on file   Social History Narrative    Not on file        Medication List           Accurate as of 10/5/18 11:59 PM. If you have any questions, ask your nurse or doctor.               START taking these medications    HYDROcodone-acetaminophen 5-325 mg per tablet  Commonly known as:  NORCO  Take 1 tablet by mouth every 6 (six) hours as needed for Pain.  Started by:  Bob nKight PA-C        CONTINUE taking these medications    gabapentin 300 MG capsule  Commonly known as:  NEURONTIN  Take 3 capsules (900 mg total) by mouth every evening. Take 45 min to 1 hour before bed as may cause drowsiness        STOP taking these medications    levoFLOXacin 750 MG tablet  Commonly known as:  LEVAQUIN  Stopped by:  Bob Knight PA-C           Where to Get Your Medications      These medications were sent to [facility] [address] [facility] [address] [facility] 16 AT [facility] 16 & [facility] [address] [address] [address]    Phone:  [phone]   · HYDROcodone-acetaminophen 5-325 mg per tablet       Review of patient's allergies indicates:   Allergen Reactions    Penicillins      Review of Systems   Constitution: Negative for chills and fever.   Cardiovascular: Negative for chest pain.   Respiratory: Negative for shortness of breath.    Musculoskeletal: Positive for back pain, falls, joint pain and joint swelling.   Gastrointestinal: Negative for abdominal pain, diarrhea, nausea  and vomiting.       Objective:   Body mass index is 31.39 kg/m².  Vitals:    10/05/18 1128   BP: (!) 141/86   Pulse: 98       General: Nato is well-developed, well-nourished, appears stated age, in no acute distress, alert and oriented to time, place and person.       General    Nursing note and vitals reviewed.  Constitutional: He is oriented to person, place, and time. He appears well-developed and well-nourished.   HENT:   Head: Atraumatic.   Eyes: EOM are normal.   Neck: Neck supple.   Pulmonary/Chest: Effort normal.   Neurological: He is alert and oriented to person, place, and time.   Psychiatric: He has a normal mood and affect. His behavior is normal.     General Musculoskeletal Exam   Gait: abnormal     Right Ankle/Foot Exam     Inspection   Bruising: Foot - present    Range of Motion   Ankle Joint   Dorsiflexion: normal   Plantar flexion: normal     Tests   External Rotation Test: negative  Squeeze Test: negative    Other   Sensation: normal    Comments:  Ecchymosis present over dorsum and plantar aspect of foot.   Pt able to wiggle all toes.    Pain over fracture site- head/neck 5th metatarsal      Back (L-Spine & T-Spine) / Neck (C-Spine) Exam     Neck (C-Spine) Range of Motion   Flexion:     Normal  Extension: Normal    Neck (C-Spine) Tests   Spurling's Test   Left:  Negative  Right: negative    Comments:  No pain to light touch or deeper palpation on exam of c-spine.   Right Shoulder Exam     Inspection/Observation   Swelling: present  Bruising: absent    Tenderness   The patient is tender to palpation of the biceps tendon and greater tuberosity.    Range of Motion   Active abduction: 60   Forward Flexion: 40   External Rotation 0 degrees: 40   Internal rotation 0 degrees: T10     Tests & Signs   Cross arm: positive  Drop arm: positive  Matos test: positive  Impingement: positive  Rotator Cuff Painful Arc/Range: moderate  Active Compression Test ([place]'s Sign): positive  Speed's Test:  positive    Other   Sensation: normal    Left Shoulder Exam     Range of Motion   Active abduction: 170   Forward Flexion: 170   External Rotation 0 degrees: 60   Internal rotation 0 degrees: T6     Muscle Strength   The patient has normal left shoulder strength.      Muscle Strength   Right Upper Extremity   Shoulder Abduction: 3/5   Shoulder Internal Rotation: 3/5   Shoulder External Rotation: 3/5   Supraspinatus: 3/5/5   Subscapularis: 3/5/5   Biceps: 3/5/5     Vascular Exam     Right Pulses  Dorsalis Pedis:      2+    Radial:                    2+        Narrative     EXAMINATION:  XR SHOULDER COMPLETE 2 OR MORE VIEWS RIGHT    CLINICAL HISTORY:  Pain in right shoulder    TECHNIQUE:  Two or three views of the right shoulder were preformed.    COMPARISON:  None    FINDINGS:  There is subtle cortical irregularity and curvilinear lucency undercutting the greater tuberosity of the proximal humerus, in keeping with nondisplaced fracture.  The acromioclavicular joint appears mildly widened with slightly high-riding distal clavicle, raising concern for possible grade 2 or grade 3 AC joint injury.  Comparison to the contralateral shoulder may be useful.      Impression       As above.     EXAMINATION:  XR FOOT COMPLETE 3 VIEW RIGHT    CLINICAL HISTORY:  . Pain in right foot    TECHNIQUE:  AP, lateral, and oblique views of the right foot were performed.    COMPARISON:  None    FINDINGS:  There is a transverse oblique fracture through the 5th metatarsal head with slight medial subluxation of the distal fracture fragment.  No evidence of dislocation.  Joint spaces are preserved.  No erosive changes demonstrated.      Impression       Fifth metatarsal head fracture as above.     EXAMINATION:  XR HUMERUS 2 VIEW RIGHT    CLINICAL HISTORY:  Pain in right shoulder    TECHNIQUE:  Two views were obtained of the right humerus.    COMPARISON:  None    FINDINGS:  There is a nondisplaced fracture undercutting the greater tuberosity of  the proximal humerus per.  Distal portions of the right humerus appear intact.      Impression       As above.      Electronically signed by: Fady Moya MD  Date: 10/05/2018  Time: 09:25       Assessment:     Encounter Diagnoses   Name Primary?    Acute pain of right shoulder due to trauma Yes    Closed nondisplaced fracture of greater tuberosity of right humerus, initial encounter     Fall (on) (from) other stairs and steps, initial encounter     Closed displaced fracture of fifth metatarsal bone of right foot, initial encounter         Plan:     1.  Pt will get CT and MRI of his right shoulder area.  2. He was issued a large short CAM Boot for his R foot and is instructed to avoid putting any weight on it. If he has to get up, he should be heel weightbearing only. Use crutches if needed.  3. R shoulder sling- for added support. He will NWB on RUE.  4. Rx for pain medications (NORCO 5). Pt also requested medication to help w/ anxiety prior to MRI  5. Apply ice to affected areas several times daily  6. Follow-up after imaging studies for review and tx recommendations. Will contact pt with results prior to appointment. Pt is a  and was given a work note indicating activity restrictions.

## 2018-10-05 NOTE — TELEPHONE ENCOUNTER
----- Message from Domingo Cohn MD sent at 10/5/2018  9:36 AM CDT -----  Patient called with results.  I have put in an order for orthopedic referral, same day, in any location

## 2018-10-05 NOTE — PROGRESS NOTES
"Subjective:     Patient ID: Nato Ng is a 33 y.o. male.    Chief Complaint: Pain of the Right Shoulder and Pain of the Right Foot    HPI   The patient is seen today for consultation regarding acute right shoulder and foot pain. He is referred by Dr. Cohn. He fell 12 feet from his fishing camp and landed on his R side, along with his dad landing on his R shoulder.  The patient reports that he was at the top of the stairs on the camp and at the same time, his dad walked up, neither one were paying attention and therefore the top stair 'broke loose' causing them to fall straight down. He felt a pop in his shoulder.  He also has pain of his R foot and ankle. He has experienced swelling in his left ankle but no pain. States his left ankle is "good." The patient was seen by his PCP this morning and referred here afterwards.    He denies any previous issues with his R shoulder and/or foot. The patient has very little active ROM. His ROM has been affected by the fall. He did have a left shoulder strain in the past- 5 years ago but did not have any surgery for it.       His current pain is an 8/10. X-rays were taken today. He is currently taking OTC pain medications and gabapentin for his lower back pain. Dr. Raman has prescribed this for him and has given him injections for his back.     Past Medical History:   Diagnosis Date    Bulging lumbar disc      Past Surgical History:   Procedure Laterality Date    LEG SURGERY       Family History   Problem Relation Age of Onset    Arthritis Maternal Grandfather     Diabetes Maternal Grandfather      Social History     Socioeconomic History    Marital status: Single     Spouse name: Not on file    Number of children: Not on file    Years of education: Not on file    Highest education level: Not on file   Social Needs    Financial resource strain: Not on file    Food insecurity - worry: Not on file    Food insecurity - inability: Not on file    Transportation needs - " medical: Not on file    Transportation needs - non-medical: Not on file   Occupational History     Employer:  [employer]   Tobacco Use    Smoking status: Current Every Day Smoker     Packs/day: 0.50     Years: 3.00     Pack years: 1.50     Types: Cigarettes     Start date: 2009    Smokeless tobacco: Current User     Types: Snuff   Substance and Sexual Activity    Alcohol use: No    Drug use: No    Sexual activity: Yes     Partners: Female     Birth control/protection: None   Other Topics Concern    Not on file   Social History Narrative    Not on file        Medication List           Accurate as of 10/5/18 11:59 PM. If you have any questions, ask your nurse or doctor.               START taking these medications    HYDROcodone-acetaminophen 5-325 mg per tablet  Commonly known as:  NORCO  Take 1 tablet by mouth every 6 (six) hours as needed for Pain.  Started by:  Bob Knight PA-C        CONTINUE taking these medications    gabapentin 300 MG capsule  Commonly known as:  NEURONTIN  Take 3 capsules (900 mg total) by mouth every evening. Take 45 min to 1 hour before bed as may cause drowsiness        STOP taking these medications    levoFLOXacin 750 MG tablet  Commonly known as:  LEVAQUIN  Stopped by:  Bob Knight PA-C           Where to Get Your Medications      These medications were sent to [facility] [address] [facility] [address] [facility] 16 AT [facility] 16 & [facility] [address] [address] [address]    Phone:  [phone]   · HYDROcodone-acetaminophen 5-325 mg per tablet       Review of patient's allergies indicates:   Allergen Reactions    Penicillins      Review of Systems   Constitution: Negative for chills and fever.   Cardiovascular: Negative for chest pain.   Respiratory: Negative for shortness of breath.    Musculoskeletal: Positive for back pain, falls, joint pain and joint swelling.   Gastrointestinal: Negative for abdominal pain, diarrhea, nausea  and vomiting.       Objective:   Body mass index is 31.39 kg/m².  Vitals:    10/05/18 1128   BP: (!) 141/86   Pulse: 98       General: Nato is well-developed, well-nourished, appears stated age, in no acute distress, alert and oriented to time, place and person.       General    Nursing note and vitals reviewed.  Constitutional: He is oriented to person, place, and time. He appears well-developed and well-nourished.   HENT:   Head: Atraumatic.   Eyes: EOM are normal.   Neck: Neck supple.   Pulmonary/Chest: Effort normal.   Neurological: He is alert and oriented to person, place, and time.   Psychiatric: He has a normal mood and affect. His behavior is normal.     General Musculoskeletal Exam   Gait: abnormal     Right Ankle/Foot Exam     Inspection   Bruising: Foot - present    Range of Motion   Ankle Joint   Dorsiflexion: normal   Plantar flexion: normal     Tests   External Rotation Test: negative  Squeeze Test: negative    Other   Sensation: normal    Comments:  Ecchymosis present over dorsum and plantar aspect of foot.   Pt able to wiggle all toes.    Pain over fracture site- head/neck 5th metatarsal      Back (L-Spine & T-Spine) / Neck (C-Spine) Exam     Neck (C-Spine) Range of Motion   Flexion:     Normal  Extension: Normal    Neck (C-Spine) Tests   Spurling's Test   Left:  Negative  Right: negative    Comments:  No pain to light touch or deeper palpation on exam of c-spine.   Right Shoulder Exam     Inspection/Observation   Swelling: present  Bruising: absent    Tenderness   The patient is tender to palpation of the biceps tendon and greater tuberosity.    Range of Motion   Active abduction: 60   Forward Flexion: 40   External Rotation 0 degrees: 40   Internal rotation 0 degrees: T10     Tests & Signs   Cross arm: positive  Drop arm: positive  Matos test: positive  Impingement: positive  Rotator Cuff Painful Arc/Range: moderate  Active Compression Test ([place]'s Sign): positive  Speed's Test:  positive    Other   Sensation: normal    Left Shoulder Exam     Range of Motion   Active abduction: 170   Forward Flexion: 170   External Rotation 0 degrees: 60   Internal rotation 0 degrees: T6     Muscle Strength   The patient has normal left shoulder strength.      Muscle Strength   Right Upper Extremity   Shoulder Abduction: 3/5   Shoulder Internal Rotation: 3/5   Shoulder External Rotation: 3/5   Supraspinatus: 3/5/5   Subscapularis: 3/5/5   Biceps: 3/5/5     Vascular Exam     Right Pulses  Dorsalis Pedis:      2+    Radial:                    2+        Narrative     EXAMINATION:  XR SHOULDER COMPLETE 2 OR MORE VIEWS RIGHT    CLINICAL HISTORY:  Pain in right shoulder    TECHNIQUE:  Two or three views of the right shoulder were preformed.    COMPARISON:  None    FINDINGS:  There is subtle cortical irregularity and curvilinear lucency undercutting the greater tuberosity of the proximal humerus, in keeping with nondisplaced fracture.  The acromioclavicular joint appears mildly widened with slightly high-riding distal clavicle, raising concern for possible grade 2 or grade 3 AC joint injury.  Comparison to the contralateral shoulder may be useful.      Impression       As above.     EXAMINATION:  XR FOOT COMPLETE 3 VIEW RIGHT    CLINICAL HISTORY:  . Pain in right foot    TECHNIQUE:  AP, lateral, and oblique views of the right foot were performed.    COMPARISON:  None    FINDINGS:  There is a transverse oblique fracture through the 5th metatarsal head with slight medial subluxation of the distal fracture fragment.  No evidence of dislocation.  Joint spaces are preserved.  No erosive changes demonstrated.      Impression       Fifth metatarsal head fracture as above.     EXAMINATION:  XR HUMERUS 2 VIEW RIGHT    CLINICAL HISTORY:  Pain in right shoulder    TECHNIQUE:  Two views were obtained of the right humerus.    COMPARISON:  None    FINDINGS:  There is a nondisplaced fracture undercutting the greater tuberosity of  the proximal humerus per.  Distal portions of the right humerus appear intact.      Impression       As above.      Electronically signed by: Fady Moya MD  Date: 10/05/2018  Time: 09:25       Assessment:     Encounter Diagnoses   Name Primary?    Acute pain of right shoulder due to trauma Yes    Closed nondisplaced fracture of greater tuberosity of right humerus, initial encounter     Fall (on) (from) other stairs and steps, initial encounter     Closed displaced fracture of fifth metatarsal bone of right foot, initial encounter         Plan:     1.  Pt will get CT and MRI of his right shoulder area.  2. He was issued a large short CAM Boot for his R foot and is instructed to avoid putting any weight on it. If he has to get up, he should be heel weightbearing only. Use crutches if needed.  3. R shoulder sling- for added support. He will NWB on RUE.  4. Rx for pain medications (NORCO 5). Pt also requested medication to help w/ anxiety prior to MRI  5. Apply ice to affected areas several times daily  6. Follow-up after imaging studies for review and tx recommendations. Will contact pt with results prior to appointment. Pt is a  and was given a work note indicating activity restrictions.

## 2018-10-05 NOTE — LETTER
October 11, 2018      Domingo Cohn MD  [address] [place] [id number]           Formerly [facility]  [address] [place] [address]  Phone: [phone]  Fax: [phone]          Patient: Nato Ng   MR Number: [id number]   YOB: 1985   Date of Visit: 10/5/2018       Dear Dr. Domingo Cohn:    Thank you for referring Nato Ng to me for evaluation. Attached you will find relevant portions of my assessment and plan of care.    If you have questions, please do not hesitate to call me. I look forward to following Nato Ng along with you.    Sincerely,    Bob Knight PA-C    Enclosure  CC:  No Recipients    If you would like to receive this communication electronically, please contact [email or web address] or [phone] to request more information on [employer] access.    For providers and/or their staff who would like to refer a patient to [facility], please contact us through our one-stop-shop provider referral line, Lydia Benitez, at [phone].    If you feel you have received this communication in error or would no longer like to receive these types of communications, please e-mail [email or web address]

## 2018-10-05 NOTE — PROGRESS NOTES
"Subjective:       Patient ID: Nato Ng is a 33 y.o. male.    Chief Complaint: Shoulder Pain (right) and Foot Pain (right)      HPI Comments:       Current Outpatient Medications:     gabapentin (NEURONTIN) 300 MG capsule, Take 3 capsules (900 mg total) by mouth every evening. Take 45 min to 1 hour before bed as may cause drowsiness, Disp: 90 capsule, Rfl: 1    diazePAM (VALIUM) 10 MG Tab, Take 1 tablet one hour before MRI scan.  Take second tablet 20 minutes before scan if needed., Disp: 2 tablet, Rfl: 0    HYDROcodone-acetaminophen (NORCO) 5-325 mg per tablet, Take 1 tablet by mouth every 6 (six) hours as needed for Pain., Disp: 30 tablet, Rfl: 0      Was working on his camp yesterday;  was 10 ft up when he support collapsed and he fell on the ground his father also fell on top of him.  He felt like his right shoulder was twisted behind him and is pretty sure it was "dislocated".  He was able to "pop back in".  Now he has had pain in his deltoid area and is unable to move his shoulder, especially in abduction.  No pain in the elbow wrist or hand.  Very minor scrapes.  Tetanus is up-to-date.  Also landed on his right foot feels like he dislocated his 5th toe.  Also was able to put it back in place but now has pain along the lateral aspect of his foot distally and some swelling and redness of the 5th toe.    No head injury loss of consciousness.  No complaints anywhere else.  This is 1st time being evaluated for this injury      Review of Systems   Constitutional: Negative for activity change, appetite change and fever.   HENT: Negative for sore throat.    Respiratory: Negative for cough and shortness of breath.    Cardiovascular: Negative for chest pain.   Gastrointestinal: Negative for abdominal pain, diarrhea and nausea.   Genitourinary: Negative for difficulty urinating.   Musculoskeletal: Positive for arthralgias. Negative for myalgias.   Neurological: Negative for dizziness and headaches.       Objective: "      Vitals:    10/05/18 0815   BP: 120/83   Pulse: 91   Temp: (!) 95.9 °F (35.5 °C)   SpO2: 98%   Weight: 105 kg (231 lb 7.7 oz)   Height: 6' (1.829 m)   PainSc:   6     Physical Exam   Constitutional: He is oriented to person, place, and time. He appears well-developed and well-nourished. No distress.   HENT:   Head: Normocephalic.   Mouth/Throat: No oropharyngeal exudate.   Neck: Neck supple. No thyromegaly present.   Cardiovascular: Normal rate, regular rhythm and normal heart sounds.   No murmur heard.  Pulses:       Radial pulses are 2+ on the right side.   Pulmonary/Chest: Effort normal and breath sounds normal. He has no wheezes. He has no rales.   Abdominal: Soft. He exhibits no distension.   Musculoskeletal: He exhibits no edema.        Right shoulder: He exhibits decreased range of motion and pain. He exhibits no bony tenderness, no swelling, no crepitus, no deformity, no laceration and normal pulse.        Feet:    Decreased abduction and flexion of right shoulder both actively and passively.  Good biceps strength with minimal discomfort.  No palpable abnormality or tenderness of the biceps muscle or tendon.  Tenderness of his deltoid superficially.  Good  strength.  Minimal swelling in the area  No tenderness or deformity of the clavicle, AC joint, GH joint.  Full range of motion without pain at neck.  No C-spine tenderness   Lymphadenopathy:     He has no cervical adenopathy.   Neurological: He is alert and oriented to person, place, and time.   Skin: Skin is warm and dry. He is not diaphoretic.   Psychiatric: He has a normal mood and affect. His behavior is normal. Judgment and thought content normal.   Nursing note and vitals reviewed.    the   Assessment:       1. Acute pain of right shoulder due to trauma    2. Acute foot pain, right    3. Humeral head fracture, right, closed, initial encounter    4. Displaced fracture of fifth metatarsal bone, left foot, initial encounter for closed fracture     5. Fall (on) (from) other stairs and steps, initial encounter        Plan:   Acute pain of right shoulder due to trauma  Comments:  X-ray of shoulder and humerus.  Ice and ibuprofen  Orders:  -     X-ray Shoulder 2 or More Views Right; Future; Expected date: 10/05/2018  -     X-Ray Humerus 2 View Right; Future; Expected date: 10/05/2018    Acute foot pain, right  Comments:  X-ray right foot.  Ice and ibuprofen  Orders:  -     X-Ray Foot Complete 3 view Right; Future; Expected date: 10/05/2018    Humeral head fracture, right, closed, initial encounter  -     Ambulatory referral to Orthopedics    Displaced fracture of fifth metatarsal bone, left foot, initial encounter for closed fracture  -     Ambulatory referral to Orthopedics    Fall (on) (from) other stairs and steps, initial encounter

## 2018-10-07 ENCOUNTER — PATIENT MESSAGE (OUTPATIENT)
Dept: ORTHOPEDICS | Facility: CLINIC | Age: 33
End: 2018-10-07

## 2018-10-08 DIAGNOSIS — M25.511 ACUTE PAIN OF RIGHT SHOULDER DUE TO TRAUMA: Primary | ICD-10-CM

## 2018-10-08 DIAGNOSIS — S42.254A CLOSED NONDISPLACED FRACTURE OF GREATER TUBEROSITY OF RIGHT HUMERUS, INITIAL ENCOUNTER: ICD-10-CM

## 2018-10-08 DIAGNOSIS — G89.11 ACUTE PAIN OF RIGHT SHOULDER DUE TO TRAUMA: Primary | ICD-10-CM
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
## 2018-10-08 RX ORDER — DIAZEPAM 10 MG/1
TABLET ORAL
Qty: 2 TABLET | Refills: 0 | Status: SHIPPED | OUTPATIENT
Start: 2018-10-08 | End: 2018-10-22

## 2018-10-15 ENCOUNTER — PATIENT MESSAGE (OUTPATIENT)
Dept: ORTHOPEDICS | Facility: CLINIC | Age: 33
End: 2018-10-15

## 2018-10-17 ENCOUNTER — HOSPITAL ENCOUNTER (OUTPATIENT)
Dept: RADIOLOGY | Facility: HOSPITAL | Age: 33
Discharge: HOME | End: 2018-10-17
Attending: PHYSICIAN ASSISTANT
Payer: COMMERCIAL

## 2018-10-17 DIAGNOSIS — G89.11 ACUTE PAIN OF RIGHT SHOULDER DUE TO TRAUMA: ICD-10-CM
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
## 2018-10-17 DIAGNOSIS — S42.254A CLOSED NONDISPLACED FRACTURE OF GREATER TUBEROSITY OF RIGHT HUMERUS, INITIAL ENCOUNTER: ICD-10-CM

## 2018-10-17 DIAGNOSIS — M25.511 ACUTE PAIN OF RIGHT SHOULDER DUE TO TRAUMA: ICD-10-CM

## 2018-10-17 PROCEDURE — A9585 GADOBUTROL INJECTION: HCPCS | Performed by: PHYSICIAN ASSISTANT

## 2018-10-17 PROCEDURE — 73202 CT UPPR EXTREMITY W/O&W/DYE: CPT | Mod: TC,RT

## 2018-10-17 PROCEDURE — 23350 INJECTION FOR SHOULDER X-RAY: CPT | Mod: TC

## 2018-10-17 PROCEDURE — 73222 MRI JOINT UPR EXTREM W/DYE: CPT | Mod: TC,RT

## 2018-10-17 PROCEDURE — 25500020 PHARM REV CODE 255: Performed by: PHYSICIAN ASSISTANT

## 2018-10-17 RX ORDER — GADOBUTROL 604.72 MG/ML
10 INJECTION INTRAVENOUS
Status: COMPLETED | OUTPATIENT
Start: 2018-10-17 | End: 2018-10-17

## 2018-10-17 RX ADMIN — IOHEXOL 25 ML: 300 INJECTION, SOLUTION INTRAVENOUS at 12:10

## 2018-10-17 RX ADMIN — GADOBUTROL 10 ML: 604.72 INJECTION INTRAVENOUS at 12:10

## 2018-10-22 ENCOUNTER — OFFICE VISIT (OUTPATIENT)
Dept: FAMILY MEDICINE | Facility: CLINIC | Age: 33
End: 2018-10-22
Payer: COMMERCIAL

## 2018-10-22 VITALS
WEIGHT: 237.31 LBS | HEART RATE: 99 BPM | TEMPERATURE: 97 F | SYSTOLIC BLOOD PRESSURE: 141 MMHG | DIASTOLIC BLOOD PRESSURE: 90 MMHG | BODY MASS INDEX: 32.19 KG/M2 | OXYGEN SATURATION: 98 %

## 2018-10-22 DIAGNOSIS — Z72.0 TOBACCO ABUSE: ICD-10-CM

## 2018-10-22 DIAGNOSIS — J00 ACUTE NASOPHARYNGITIS: Primary | ICD-10-CM

## 2018-10-22 LAB
CTP QC/QA: YES
S PYO RRNA THROAT QL PROBE: NEGATIVE

## 2018-10-22 PROCEDURE — 99999 PR PBB SHADOW E&M-EST. PATIENT-LVL III: CPT | Mod: PBBFAC,,, | Performed by: NURSE PRACTITIONER

## 2018-10-22 PROCEDURE — 87880 STREP A ASSAY W/OPTIC: CPT | Mod: QW,S$GLB,, | Performed by: NURSE PRACTITIONER

## 2018-10-22 PROCEDURE — 3008F BODY MASS INDEX DOCD: CPT | Mod: CPTII,S$GLB,, | Performed by: NURSE PRACTITIONER

## 2018-10-22 PROCEDURE — 99213 OFFICE O/P EST LOW 20 MIN: CPT | Mod: 24,S$GLB,, | Performed by: NURSE PRACTITIONER

## 2018-10-22 RX ORDER — PROMETHAZINE HYDROCHLORIDE AND DEXTROMETHORPHAN HYDROBROMIDE 6.25; 15 MG/5ML; MG/5ML
5 SYRUP ORAL 3 TIMES DAILY PRN
Qty: 118 ML | Refills: 0 | Status: SHIPPED | OUTPATIENT
Start: 2018-10-22 | End: 2018-11-01

## 2018-10-22 RX ORDER — METHYLPREDNISOLONE ACETATE 80 MG/ML
80 INJECTION, SUSPENSION INTRA-ARTICULAR; INTRALESIONAL; INTRAMUSCULAR; SOFT TISSUE ONCE
Status: DISCONTINUED | OUTPATIENT
Start: 2018-10-22 | End: 2019-06-27

## 2018-10-22 RX ORDER — LEVOCETIRIZINE DIHYDROCHLORIDE 5 MG/1
5 TABLET, FILM COATED ORAL NIGHTLY
Qty: 30 TABLET | Refills: 11 | Status: SHIPPED | OUTPATIENT
Start: 2018-10-22 | End: 2019-12-05 | Stop reason: SDUPTHER

## 2018-10-22 RX ORDER — FLUTICASONE PROPIONATE 50 MCG
1 SPRAY, SUSPENSION (ML) NASAL DAILY
Qty: 16 ML | Refills: 0 | Status: SHIPPED | OUTPATIENT
Start: 2018-10-22 | End: 2019-03-02

## 2018-10-22 NOTE — PROGRESS NOTES
CC:   Chief Complaint   Patient presents with    Sore Throat    Cough    Sinusitis     HPI: This is a new problem.   Nato Ng is a 33 y.o. male with a complaint of URI.  The current episode started in the past 3 days.   The problem has been gradually worsening.   Associated symptoms included nasal congestion, rhinorrhea, cough.    Pertinent negatives include fever, chills, dyspnea, wheezing   Treatments tried: otc decongestant has been used and this has provided no relief.   Tobacco abuse    [unfilled]  Outpatient Medications Prior to Visit   Medication Sig Dispense Refill    HYDROcodone-acetaminophen (NORCO) 5-325 mg per tablet Take 1 tablet by mouth every 6 (six) hours as needed for Pain. 30 tablet 0    gabapentin (NEURONTIN) 300 MG capsule Take 3 capsules (900 mg total) by mouth every evening. Take 45 min to 1 hour before bed as may cause drowsiness 90 capsule 1    diazePAM (VALIUM) 10 MG Tab Take 1 tablet one hour before MRI scan.  Take second tablet 20 minutes before scan if needed. 2 tablet 0     No facility-administered medications prior to visit.         Physical Exam   BP (!) 141/90   Pulse 99   Temp 97.1 °F (36.2 °C) (Tympanic)   Wt 107.6 kg (237 lb 5.2 oz)   SpO2 98%   BMI 32.19 kg/m²   Constitutional: The patient appears well-developed and well-nourished.   Head: Normocephalic and atraumatic.   Right Ear: Tympanic membrane and ear canal normal. No drainage, swelling or tenderness. Tympanic membrane is not injected, not erythematous and not bulging.   Left Ear: Ear canal normal. No drainage, swelling or tenderness. Tympanic membrane is not injected, not erythematous and not bulging.   Nose: Mucosal edema and rhinorrhea present. No sinus tenderness on palpation  Mouth/Throat: Uvula is midline. Posterior oropharyngeal erythema present. No oropharyngeal exudate.        THE MUCOSA IS BOGGY AND ERYTHEMATOUS.     Eyes: Conjunctivae normal and lids are normal. Pupils are equal, round, and  reactive to light. Right eye exhibits no discharge. Left eye exhibits no discharge. Right eye exhibits normal extraocular motion. Left eye exhibits normal extraocular motion.   Neck: Trachea normal and normal range of motion. Neck supple. No tracheal tenderness present. No mass and no thyromegaly present.   Cardiovascular: Normal rate, regular rhythm, S1 normal, S2 normal and normal heart sounds.  Exam reveals no gallop, no S3, no S4 and no friction rub.    No murmur heard.  Pulmonary/Chest: Effort normal and breath sounds normal. No stridor. Not tachypneic. No respiratory distress. The patient has no wheezes. The patient has no rhonchi. The patient has no rales.   Skin: The patient is not diaphoretic.     Encounter Diagnosis   Name Primary?    Acute nasopharyngitis Yes       PLAN:    Nato was seen today for sore throat, cough and sinusitis.    Diagnoses and all orders for this visit:    Acute nasopharyngitis  -     POCT Rapid Strep A  -     fluticasone (FLONASE) 50 mcg/actuation nasal spray; 1 spray (50 mcg total) by Each Nare route once daily.  -     levocetirizine (XYZAL) 5 MG tablet; Take 1 tablet (5 mg total) by mouth every evening.  -     promethazine-dextromethorphan (PROMETHAZINE-DM) 6.25-15 mg/5 mL Syrp; Take 5 mLs by mouth 3 (three) times daily as needed (cough).  Symptomatic treatment discussed. Verbalized understanding    Tobacco abuse  -smoking cessation    Medications Ordered This Encounter   Medications    fluticasone (FLONASE) 50 mcg/actuation nasal spray     Si spray (50 mcg total) by Each Nare route once daily.     Dispense:  16 mL     Refill:  0    levocetirizine (XYZAL) 5 MG tablet     Sig: Take 1 tablet (5 mg total) by mouth every evening.     Dispense:  30 tablet     Refill:  11    promethazine-dextromethorphan (PROMETHAZINE-DM) 6.25-15 mg/5 mL Syrp     Sig: Take 5 mLs by mouth 3 (three) times daily as needed (cough).     Dispense:  118 mL     Refill:  0     Orders Placed This  Encounter   Procedures    POCT Rapid Strep A     RTC if symptoms are worsening or changing significantly or if not improved by the end of therapy.

## 2018-10-24 ENCOUNTER — PATIENT MESSAGE (OUTPATIENT)
Dept: FAMILY MEDICINE | Facility: CLINIC | Age: 33
End: 2018-10-24

## 2018-10-24 ENCOUNTER — OFFICE VISIT (OUTPATIENT)
Dept: ORTHOPEDICS | Facility: CLINIC | Age: 33
End: 2018-10-24
Payer: COMMERCIAL

## 2018-10-24 ENCOUNTER — HOSPITAL ENCOUNTER (OUTPATIENT)
Dept: RADIOLOGY | Facility: HOSPITAL | Age: 33
Discharge: HOME | End: 2018-10-24
Attending: ORTHOPAEDIC SURGERY
Payer: COMMERCIAL

## 2018-10-24 ENCOUNTER — PATIENT MESSAGE (OUTPATIENT)
Dept: ORTHOPEDICS | Facility: CLINIC | Age: 33
End: 2018-10-24

## 2018-10-24 VITALS
DIASTOLIC BLOOD PRESSURE: 89 MMHG | BODY MASS INDEX: 32.1 KG/M2 | HEART RATE: 102 BPM | WEIGHT: 237 LBS | SYSTOLIC BLOOD PRESSURE: 134 MMHG | HEIGHT: 72 IN

## 2018-10-24 DIAGNOSIS — R52 PAIN: Primary | ICD-10-CM

## 2018-10-24 DIAGNOSIS — S42.254D CLOSED NONDISPLACED FRACTURE OF GREATER TUBEROSITY OF RIGHT HUMERUS WITH ROUTINE HEALING, SUBSEQUENT ENCOUNTER: Primary | ICD-10-CM

## 2018-10-24 DIAGNOSIS — R52 PAIN: ICD-10-CM

## 2018-10-24 DIAGNOSIS — S92.351D CLOSED DISPLACED FRACTURE OF FIFTH METATARSAL BONE OF RIGHT FOOT WITH ROUTINE HEALING, SUBSEQUENT ENCOUNTER: ICD-10-CM

## 2018-10-24 PROCEDURE — 73630 X-RAY EXAM OF FOOT: CPT | Mod: 26,RT,, | Performed by: RADIOLOGY

## 2018-10-24 PROCEDURE — 3008F BODY MASS INDEX DOCD: CPT | Mod: CPTII,S$GLB,, | Performed by: ORTHOPAEDIC SURGERY

## 2018-10-24 PROCEDURE — 99024 POSTOP FOLLOW-UP VISIT: CPT | Mod: S$GLB,,, | Performed by: ORTHOPAEDIC SURGERY

## 2018-10-24 PROCEDURE — 99999 PR PBB SHADOW E&M-EST. PATIENT-LVL III: CPT | Mod: PBBFAC,,, | Performed by: ORTHOPAEDIC SURGERY

## 2018-10-24 PROCEDURE — 73030 X-RAY EXAM OF SHOULDER: CPT | Mod: 26,RT,, | Performed by: RADIOLOGY

## 2018-10-24 PROCEDURE — 73630 X-RAY EXAM OF FOOT: CPT | Mod: TC,RT

## 2018-10-24 PROCEDURE — 73030 X-RAY EXAM OF SHOULDER: CPT | Mod: TC,RT

## 2018-10-24 NOTE — PROGRESS NOTES
Subjective:     Patient ID: Nato Ng is a 33 y.o. male.    Chief Complaint: Pain of the Right Shoulder    He had a fall injuring his right shoulder and right lateral forefoot 2-3 weeks ago.    He is a . He is not wearing his sling as was instructed previously. He is not wearing boot or post op shoe as was instructed previously.    He fell 12 feet from his fishing camp and landed on his R side, along with his dad landing on his R shoulder.  The patient reports that he was at the top of the stairs on the camp and at the same time, his dad walked up, neither one were paying attention and therefore the top stair 'broke loose' causing them to fall straight down. He felt a pop in his shoulder.  He also has pain of his R foot and ankle.      Shoulder Injury    The pain is present in the right shoulder. This is a recurrent problem. The current episode started 1 to 4 weeks ago. The injury was the result of a falling (pt states he fell 10-12ft and your dad fell on top of him,.) action while at home. The problem occurs intermittently. The problem has been gradually improving. The quality of the pain is described as aching, sharp, shooting and burning. The pain is at a severity of 3/10. Pertinent negatives include no fever or itching. The symptoms are aggravated by activity, bearing weight, cold and lifting. He has tried NSAIDs and oral narcotics for the symptoms. Physical therapy was not tried.      Past Medical History:   Diagnosis Date    Bulging lumbar disc      Past Surgical History:   Procedure Laterality Date    LEG SURGERY       Family History   Problem Relation Age of Onset    Arthritis Maternal Grandfather     Diabetes Maternal Grandfather      Social History     Socioeconomic History    Marital status: Single     Spouse name: Not on file    Number of children: Not on file    Years of education: Not on file    Highest education level: Not on file   Social Needs    Financial resource strain:  Not on file    Food insecurity - worry: Not on file    Food insecurity - inability: Not on file    Transportation needs - medical: Not on file    Transportation needs - non-medical: Not on file   Occupational History     Employer: [employer]   Tobacco Use    Smoking status: Current Every Day Smoker     Packs/day: 0.50     Years: 3.00     Pack years: 1.50     Types: Cigarettes     Start date: 2009    Smokeless tobacco: Current User     Types: Snuff   Substance and Sexual Activity    Alcohol use: No    Drug use: No    Sexual activity: Yes     Partners: Female     Birth control/protection: None   Other Topics Concern    Not on file   Social History Narrative    Not on file        Medication List           Accurate as of 10/24/18 11:59 PM. If you have any questions, ask your nurse or doctor.               CONTINUE taking these medications    fluticasone 50 mcg/actuation nasal spray  Commonly known as:  FLONASE  1 spray (50 mcg total) by Each Nare route once daily.     gabapentin 300 MG capsule  Commonly known as:  NEURONTIN  Take 3 capsules (900 mg total) by mouth every evening. Take 45 min to 1 hour before bed as may cause drowsiness     HYDROcodone-acetaminophen 5-325 mg per tablet  Commonly known as:  NORCO  Take 1 tablet by mouth every 6 (six) hours as needed for Pain.     levocetirizine 5 MG tablet  Commonly known as:  XYZAL  Take 1 tablet (5 mg total) by mouth every evening.     promethazine-dextromethorphan 6.25-15 mg/5 mL Syrp  Commonly known as:  PROMETHAZINE-DM  Take 5 mLs by mouth 3 (three) times daily as needed (cough).          Review of patient's allergies indicates:   Allergen Reactions    Penicillins      Review of Systems   Constitution: Negative for fever.   HENT: Negative for sore throat.    Eyes: Negative for blurred vision.   Cardiovascular: Negative for dyspnea on exertion.   Respiratory: Negative for shortness of breath.    Hematologic/Lymphatic: Does not bruise/bleed easily.    Skin: Negative for itching.   Musculoskeletal: Positive for joint pain.   Gastrointestinal: Negative for vomiting.   Genitourinary: Negative for dysuria.   Neurological: Negative for dizziness.   Psychiatric/Behavioral: The patient does not have insomnia.        Objective:   Body mass index is 32.14 kg/m².  Vitals:    10/24/18 0725   BP: 134/89   Pulse: 102           General    Nursing note and vitals reviewed.  Constitutional: He is oriented to person, place, and time. He appears well-developed. No distress.   HENT:   Head: Normocephalic and atraumatic.   Eyes: EOM are normal.   Cardiovascular: Normal rate.    Pulmonary/Chest: Effort normal. No stridor.   Neurological: He is alert and oriented to person, place, and time.   Psychiatric: He has a normal mood and affect. His behavior is normal.         Right Ankle/Foot Exam     Other   Sensation: normal    Comments:  Mild pain with palpation of 5th MTP, skin intact    Right Shoulder Exam     Other   Sensation: normal    Comments:  Formal ROM not tested, gentle IR and ER of the right shoulder is not painful for him    Skin is intact    RTC strength not tested    Vascular Exam     Right Pulses  Dorsalis Pedis:      2+          Capillary Refill  Right Hand: normal capillary refill      IMAGING   COMPARISON:  October 5, 2018    FINDINGS:  Healing fracture faintly visualized at the base of the greater tuberosity.  Mild widening of the AC joint again noted.  Normal articulation maintained at the glenohumeral joint.  Remaining osseous structures appear intact.  Visualized right lung field is clear.      Impression       As above.      Electronically signed by: Robert Lopez MD  Date: 10/24/2018  Time: 09:42     COMPARISON:  October 5, 2018    FINDINGS:  Little interval change from prior exam.  Minimally displaced transverse fracture through the head of the 5th metatarsal noted.  Articulation maintained at the CMC and MCP joints.  No significant callus formation.   Persistent soft tissue swelling along the lateral margin of the forefoot.    Pes planus incidentally noted.  Mild degenerative change in the midfoot.  Early plantar calcaneal spur.      Impression       Stable appearance minimally displaced fracture involving the head 5th metatarsal.  See above.      Electronically signed by: Robert Lopez MD  Date: 10/24/2018  Time: 09:49       Reviewed Ct and MRI    Assessment:     Encounter Diagnoses   Name Primary?    Closed nondisplaced fracture of greater tuberosity of right humerus with routine healing, subsequent encounter Yes    Closed displaced fracture of fifth metatarsal bone of right foot with routine healing, subsequent encounter         Plan:     R shoulder NWB  In sling  Okay for pendulums to start in 1 week and table slides to start in 2 weeks  Otherwise in sling    Right foot NWB through forefoot  Post op shoe or CAM boot    Follow up in 1 month with new radiographs right shoulder and right foot    Discussed that if he does not follow restrictions he may displace these fractures, requiring surgery and in some cases these fractures may not be able to be well reconstructed

## 2018-10-29 ENCOUNTER — OFFICE VISIT (OUTPATIENT)
Dept: DERMATOLOGY | Facility: CLINIC | Age: 33
End: 2018-10-29
Payer: COMMERCIAL

## 2018-10-29 DIAGNOSIS — L81.4 LENTIGINES: ICD-10-CM

## 2018-10-29 DIAGNOSIS — L91.8 ACROCHORDON: ICD-10-CM

## 2018-10-29 DIAGNOSIS — D22.9 MULTIPLE NEVI: Primary | ICD-10-CM

## 2018-10-29 PROCEDURE — 99999 PR PBB SHADOW E&M-EST. PATIENT-LVL II: CPT | Mod: PBBFAC,,, | Performed by: DERMATOLOGY

## 2018-10-29 PROCEDURE — 11200 RMVL SKIN TAGS UP TO&INC 15: CPT | Mod: S$GLB,,, | Performed by: DERMATOLOGY

## 2018-10-29 PROCEDURE — 99203 OFFICE O/P NEW LOW 30 MIN: CPT | Mod: 25,S$GLB,, | Performed by: DERMATOLOGY

## 2018-10-29 NOTE — PROGRESS NOTES
Subjective:       Patient ID:  Nato Ng is a 33 y.o. male who presents for   Chief Complaint   Patient presents with    Skin Tags     c/o skin tags to back of neck and left eyelid     History of Present Illness: The patient presents with chief complaint of lesions.  Location: left neck and left eyelid  Duration: several yrs  Signs/Symptoms: irritated    Prior treatments: none    The patient denies personal or family history of skin cancer.          Review of Systems   Constitutional: Negative for fever and chills.   Gastrointestinal: Negative for nausea and vomiting.   Skin: Positive for activity-related sunscreen use. Negative for daily sunscreen use and recent sunburn.   Hematologic/Lymphatic: Does not bruise/bleed easily.        Objective:    Physical Exam   Constitutional: He appears well-developed and well-nourished. No distress.   Neurological: He is alert and oriented to person, place, and time. He is not disoriented.   Psychiatric: He has a normal mood and affect.   Skin:   Areas Examined (abnormalities noted in diagram):   Scalp / Hair Palpated and Inspected  Head / Face Inspection Performed  Neck Inspection Performed  Chest / Axilla Inspection Performed  Abdomen Inspection Performed  Back Inspection Performed  RUE Inspected  LUE Inspection Performed  RLE Inspected  LLE Inspection Performed  Nails and Digits Inspection Performed                   Diagram Legend     Erythematous scaling macule/papule c/w actinic keratosis       Vascular papule c/w angioma      Pigmented verrucoid papule/plaque c/w seborrheic keratosis      Yellow umbilicated papule c/w sebaceous hyperplasia      Irregularly shaped tan macule c/w lentigo     1-2 mm smooth white papules consistent with Milia      Movable subcutaneous cyst with punctum c/w epidermal inclusion cyst      Subcutaneous movable cyst c/w pilar cyst      Firm pink to brown papule c/w dermatofibroma      Pedunculated fleshy papule(s) c/w skin tag(s)       Evenly pigmented macule c/w junctional nevus     Mildly variegated pigmented, slightly irregular-bordered macule c/w mildly atypical nevus      Flesh colored to evenly pigmented papule c/w intradermal nevus       Pink pearly papule/plaque c/w basal cell carcinoma      Erythematous hyperkeratotic cursted plaque c/w SCC      Surgical scar with no sign of skin cancer recurrence      Open and closed comedones      Inflammatory papules and pustules      Verrucoid papule consistent consistent with wart     Erythematous eczematous patches and plaques     Dystrophic onycholytic nail with subungual debris c/w onychomycosis     Umbilicated papule    Erythematous-base heme-crusted tan verrucoid plaque consistent with inflamed seborrheic keratosis     Erythematous Silvery Scaling Plaque c/w Psoriasis     See annotation      Assessment / Plan:        Multiple nevi  Lentigines  Reassurance given.  Discussed ABCDEF of melanoma and changes for patient to look for.  AAD Handout given. Discussed importance of daily use of sunscreen which is broad-spectrum and has a minimum SPF of 30.    Acrochordon  Reassurance provided.  Informed patient that lesions are benign.  After risks, benefits and alternatives explained, including allergic reaction to anesthesia (if given), pain, recurrence, and scar, and verbal consent was obtained, 1 lesions of left lateral canthus treated with scissor excision.  Patient tolerated well.  Hemostasis achieved with aluminum chloride.  Verbal wound care instructions were given.              Follow-up if symptoms worsen or fail to improve.

## 2018-10-30 RX ORDER — AZITHROMYCIN 250 MG/1
TABLET, FILM COATED ORAL
Qty: 6 TABLET | Refills: 0 | Status: SHIPPED | OUTPATIENT
Start: 2018-10-30 | End: 2018-11-04

## 2018-11-21 DIAGNOSIS — M25.511 RIGHT SHOULDER PAIN, UNSPECIFIED CHRONICITY: ICD-10-CM

## 2018-11-21 DIAGNOSIS — M79.671 RIGHT FOOT PAIN: Primary | ICD-10-CM

## 2018-11-23 ENCOUNTER — HOSPITAL ENCOUNTER (OUTPATIENT)
Dept: RADIOLOGY | Facility: HOSPITAL | Age: 33
Discharge: HOME | End: 2018-11-23
Attending: ORTHOPAEDIC SURGERY
Payer: COMMERCIAL

## 2018-11-23 ENCOUNTER — OFFICE VISIT (OUTPATIENT)
Dept: ORTHOPEDICS | Facility: CLINIC | Age: 33
End: 2018-11-23
Payer: COMMERCIAL

## 2018-11-23 VITALS
HEIGHT: 72 IN | SYSTOLIC BLOOD PRESSURE: 125 MMHG | BODY MASS INDEX: 32.1 KG/M2 | HEART RATE: 90 BPM | DIASTOLIC BLOOD PRESSURE: 77 MMHG | WEIGHT: 237 LBS

## 2018-11-23 DIAGNOSIS — M25.511 RIGHT SHOULDER PAIN, UNSPECIFIED CHRONICITY: ICD-10-CM

## 2018-11-23 DIAGNOSIS — S92.351D CLOSED DISPLACED FRACTURE OF FIFTH METATARSAL BONE OF RIGHT FOOT WITH ROUTINE HEALING, SUBSEQUENT ENCOUNTER: ICD-10-CM

## 2018-11-23 DIAGNOSIS — M79.671 RIGHT FOOT PAIN: ICD-10-CM

## 2018-11-23 DIAGNOSIS — S42.254D CLOSED NONDISPLACED FRACTURE OF GREATER TUBEROSITY OF RIGHT HUMERUS WITH ROUTINE HEALING, SUBSEQUENT ENCOUNTER: Primary | ICD-10-CM

## 2018-11-23 PROCEDURE — 73030 X-RAY EXAM OF SHOULDER: CPT | Mod: 26,RT,, | Performed by: RADIOLOGY

## 2018-11-23 PROCEDURE — 3008F BODY MASS INDEX DOCD: CPT | Mod: CPTII,S$GLB,, | Performed by: ORTHOPAEDIC SURGERY

## 2018-11-23 PROCEDURE — 73030 X-RAY EXAM OF SHOULDER: CPT | Mod: TC,RT

## 2018-11-23 PROCEDURE — 73630 X-RAY EXAM OF FOOT: CPT | Mod: 26,RT,, | Performed by: RADIOLOGY

## 2018-11-23 PROCEDURE — 99024 POSTOP FOLLOW-UP VISIT: CPT | Mod: S$GLB,,, | Performed by: ORTHOPAEDIC SURGERY

## 2018-11-23 PROCEDURE — 73630 X-RAY EXAM OF FOOT: CPT | Mod: TC,RT

## 2018-11-23 PROCEDURE — 97110 THERAPEUTIC EXERCISES: CPT | Mod: GP,S$GLB,, | Performed by: ORTHOPAEDIC SURGERY

## 2018-11-23 PROCEDURE — 99999 PR PBB SHADOW E&M-EST. PATIENT-LVL III: CPT | Mod: PBBFAC,,, | Performed by: ORTHOPAEDIC SURGERY

## 2018-11-23 NOTE — PROGRESS NOTES
Subjective:     Patient ID: Nato Ng is a 33 y.o. male.    Chief Complaint: Pain of the Right Shoulder and Pain of the Right Foot    Since last visit pain has been significantly improving, only mild soreness to the shoulder to the foot. No new injuries.    Prior:     He is a     He fell 12 feet from his fishing camp and landed on his R side, along with his dad landing on his R shoulder.  The patient reports that he was at the top of the stairs on the camp and at the same time, his dad walked up, neither one were paying attention and therefore the top stair 'broke loose' causing them to fall straight down. He felt a pop in his shoulder.  He also has pain of his R foot and ankle.      Shoulder Injury    The pain is present in the right shoulder. This is a recurrent problem. The current episode started more than 1 month ago. The injury was the result of a falling (pt states he fell 10-12ft and your dad fell on top of him,.) action while at home. The problem occurs intermittently. The problem has been gradually improving. The quality of the pain is described as aching, sharp, shooting and burning. The pain is at a severity of 5/10. Pertinent negatives include no fever or itching. The symptoms are aggravated by activity, bearing weight, cold and lifting. He has tried NSAIDs and oral narcotics for the symptoms. Physical therapy was not tried.  Foot Injury    The pain is present in the right foot. This is a recurrent problem. The current episode started more than 1 month ago. The injury was the result of a falling (pt fell 12ft from his fishing camp) action while at home. The problem occurs intermittently. The problem has been gradually improving. The quality of the pain is described as aching. The pain is at a severity of 5/10. Pertinent negatives include no fever or itching. The symptoms are aggravated by activity. He has tried nothing for the symptoms. Physical therapy was not tried.      Past Medical  History:   Diagnosis Date    Bulging lumbar disc      Past Surgical History:   Procedure Laterality Date    LEG SURGERY       Family History   Problem Relation Age of Onset    Arthritis Maternal Grandfather     Diabetes Maternal Grandfather      Social History     Socioeconomic History    Marital status: Single     Spouse name: Not on file    Number of children: Not on file    Years of education: Not on file    Highest education level: Not on file   Social Needs    Financial resource strain: Not on file    Food insecurity - worry: Not on file    Food insecurity - inability: Not on file    Transportation needs - medical: Not on file    Transportation needs - non-medical: Not on file   Occupational History     Employer:  [employer] de[employer]   Tobacco Use    Smoking status: Current Every Day Smoker     Packs/day: 0.50     Years: 3.00     Pack years: 1.50     Types: Cigarettes     Start date: 2009    Smokeless tobacco: Current User     Types: Snuff   Substance and Sexual Activity    Alcohol use: No    Drug use: No    Sexual activity: Yes     Partners: Female     Birth control/protection: None   Other Topics Concern    Not on file   Social History Narrative    Not on file        Medication List           Accurate as of 11/23/18 12:50 PM. If you have any questions, ask your nurse or doctor.               CONTINUE taking these medications    fluticasone 50 mcg/actuation nasal spray  Commonly known as:  FLONASE  1 spray (50 mcg total) by Each Nare route once daily.     gabapentin 300 MG capsule  Commonly known as:  NEURONTIN  Take 3 capsules (900 mg total) by mouth every evening. Take 45 min to 1 hour before bed as may cause drowsiness     HYDROcodone-acetaminophen 5-325 mg per tablet  Commonly known as:  NORCO  Take 1 tablet by mouth every 6 (six) hours as needed for Pain.     levocetirizine 5 MG tablet  Commonly known as:  XYZAL  Take 1 tablet (5 mg total) by mouth every evening.          Review of  patient's allergies indicates:   Allergen Reactions    Penicillins      Review of Systems   Constitution: Negative for fever.   HENT: Negative for sore throat.    Eyes: Negative for blurred vision.   Cardiovascular: Negative for dyspnea on exertion.   Respiratory: Negative for shortness of breath.    Hematologic/Lymphatic: Does not bruise/bleed easily.   Skin: Negative for itching.   Musculoskeletal: Positive for joint pain.   Gastrointestinal: Negative for vomiting.   Genitourinary: Negative for dysuria.   Neurological: Negative for dizziness.   Psychiatric/Behavioral: The patient does not have insomnia.        Objective:   Body mass index is 32.14 kg/m².  Vitals:    11/23/18 0801   BP: 125/77   Pulse: 90           General    Nursing note and vitals reviewed.  Constitutional: He is oriented to person, place, and time. He appears well-developed. No distress.   HENT:   Head: Normocephalic and atraumatic.   Eyes: EOM are normal.   Cardiovascular: Normal rate.    Pulmonary/Chest: Effort normal. No stridor.   Neurological: He is alert and oriented to person, place, and time.   Psychiatric: He has a normal mood and affect. His behavior is normal.         Right Ankle/Foot Exam     Other   Sensation: normal    Comments:  Nontender to palpation over the forefoot especially over the 5th ray, skin is intact, toes warm well-perfused    Right Shoulder Exam     Other   Sensation: normal    Comments:  Right shoulder nonpainful to palpation over the greater tuberosity    Forward flexion to 160-170 with only mild pain and soreness    External rotation at the side is 30    Internal rotation is to L2    Rotator cuff strength at the side is 5/5        Left Shoulder Exam     Other   Sensation: normal       Vascular Exam       Capillary Refill  Right Hand: normal capillary refill  Left Hand: normal capillary refill      IMAGING  shoulder radiographs reviewed by me today demonstrate healed greater tuberosity fracture no signs of  displacement next    Right foot radiographs also reviewed demonstrating healed 5th metatarsal fracture no signs of interval displacement, good interval bone healing.    Assessment:     Encounter Diagnoses   Name Primary?    Closed nondisplaced fracture of greater tuberosity of right humerus with routine healing, subsequent encounter Yes    Closed displaced fracture of fifth metatarsal bone of right foot with routine healing, subsequent encounter         Plan:     Discussed that it appears he is healing very well, anticipate slow increase back into normal activities with return to work in approximately 3 weeks, starting December 17th    HEP 19838 - I, instructed and demonstrated a right shoulder stretching HEP. The patient then demonstrated understanding of exercises and proper technique. This program was performed for 15 minutes.       He will have no restrictions that point as long as pain following.  He will follow up as needed with any issues or concerns.

## 2018-11-27 ENCOUNTER — PATIENT MESSAGE (OUTPATIENT)
Dept: ORTHOPEDICS | Facility: CLINIC | Age: 33
End: 2018-11-27

## 2018-11-29 DIAGNOSIS — R52 PAIN: Primary | ICD-10-CM

## 2018-11-30 RX ORDER — MELOXICAM 15 MG/1
15 TABLET ORAL DAILY
Qty: 30 TABLET | Refills: 0 | Status: SHIPPED | OUTPATIENT
Start: 2018-11-30 | End: 2018-12-22

## 2018-12-22 ENCOUNTER — OFFICE VISIT (OUTPATIENT)
Dept: URGENT CARE | Facility: CLINIC | Age: 33
End: 2018-12-22
Payer: COMMERCIAL

## 2018-12-22 VITALS
WEIGHT: 235 LBS | HEIGHT: 72 IN | DIASTOLIC BLOOD PRESSURE: 78 MMHG | TEMPERATURE: 98 F | SYSTOLIC BLOOD PRESSURE: 110 MMHG | OXYGEN SATURATION: 99 % | HEART RATE: 86 BPM | RESPIRATION RATE: 18 BRPM | BODY MASS INDEX: 31.83 KG/M2

## 2018-12-22 DIAGNOSIS — J01.90 ACUTE BACTERIAL SINUSITIS: ICD-10-CM

## 2018-12-22 DIAGNOSIS — R05.9 COUGH: Primary | ICD-10-CM

## 2018-12-22 DIAGNOSIS — R53.83 OTHER FATIGUE: ICD-10-CM

## 2018-12-22 DIAGNOSIS — J45.20 MILD INTERMITTENT ASTHMA WITHOUT COMPLICATION: ICD-10-CM

## 2018-12-22 DIAGNOSIS — R06.2 WHEEZING: ICD-10-CM

## 2018-12-22 DIAGNOSIS — B96.89 ACUTE BACTERIAL SINUSITIS: ICD-10-CM

## 2018-12-22 PROCEDURE — 94640 AIRWAY INHALATION TREATMENT: CPT | Mod: S$GLB,,, | Performed by: NURSE PRACTITIONER

## 2018-12-22 PROCEDURE — 3008F BODY MASS INDEX DOCD: CPT | Mod: CPTII,S$GLB,, | Performed by: NURSE PRACTITIONER

## 2018-12-22 PROCEDURE — 99999 PR PBB SHADOW E&M-EST. PATIENT-LVL IV: CPT | Mod: PBBFAC,,, | Performed by: NURSE PRACTITIONER

## 2018-12-22 PROCEDURE — 99214 OFFICE O/P EST MOD 30 MIN: CPT | Mod: 25,S$GLB,, | Performed by: NURSE PRACTITIONER

## 2018-12-22 PROCEDURE — 96372 THER/PROPH/DIAG INJ SC/IM: CPT | Mod: ,,, | Performed by: NURSE PRACTITIONER

## 2018-12-22 RX ORDER — PROMETHAZINE HYDROCHLORIDE AND DEXTROMETHORPHAN HYDROBROMIDE 6.25; 15 MG/5ML; MG/5ML
5 SYRUP ORAL
Qty: 180 ML | Refills: 0 | Status: SHIPPED | OUTPATIENT
Start: 2018-12-22 | End: 2019-03-02 | Stop reason: SDUPTHER

## 2018-12-22 RX ORDER — PREDNISONE 20 MG/1
20 TABLET ORAL 2 TIMES DAILY
Qty: 10 TABLET | Refills: 0 | Status: SHIPPED | OUTPATIENT
Start: 2018-12-22 | End: 2018-12-27

## 2018-12-22 RX ORDER — DOXYCYCLINE 100 MG/1
100 CAPSULE ORAL 2 TIMES DAILY
Qty: 20 CAPSULE | Refills: 0 | Status: SHIPPED | OUTPATIENT
Start: 2018-12-22 | End: 2019-01-01

## 2018-12-22 RX ORDER — LEVALBUTEROL INHALATION SOLUTION 1.25 MG/3ML
1.25 SOLUTION RESPIRATORY (INHALATION)
Status: COMPLETED | OUTPATIENT
Start: 2018-12-22 | End: 2018-12-22

## 2018-12-22 RX ORDER — ALBUTEROL SULFATE 90 UG/1
2 AEROSOL, METERED RESPIRATORY (INHALATION) EVERY 4 HOURS PRN
Qty: 1 INHALER | Refills: 1 | Status: SHIPPED | OUTPATIENT
Start: 2018-12-22 | End: 2020-09-30

## 2018-12-22 RX ADMIN — LEVALBUTEROL INHALATION SOLUTION 1.25 MG: 1.25 SOLUTION RESPIRATORY (INHALATION) at 11:12

## 2018-12-22 NOTE — PROGRESS NOTES
Chief complaint/reason for visit: Nasal congestion, postnasal drip, cough and fever    HISTORY OF PRESENT ILLNESS:  34 y/o male complains of nasal congestion, postnasal drip dark mucus, headache, ears popping, shortness of breath, productive cough with wheezing and chills onset 2-3 weeks ago.  Patient states seen and treated in primary care 3-4 weeks ago with slight relief.  Patient complains cough with wheezing worse at night.  Admits has a past history of asthma.  Admits tried medication with no relief.  Patient denies chest pain, nausea, vomiting, diarrhea and no body aches.  Discussed with patient the need for nebulize treatment.         Past Medical History:   Diagnosis Date    Bulging lumbar disc        Past Surgical History:   Procedure Laterality Date    LEG SURGERY              Family History   Problem Relation Age of Onset    Arthritis Maternal Grandfather     Diabetes Maternal Grandfather             Social History     Socioeconomic History    Marital status: Single     Spouse name: Not on file    Number of children: Not on file    Years of education: Not on file    Highest education level: Not on file   Social Needs    Financial resource strain: Not on file    Food insecurity - worry: Not on file    Food insecurity - inability: Not on file    Transportation needs - medical: Not on file    Transportation needs - non-medical: Not on file   Occupational History     Employer:  [employer] de[employer]   Tobacco Use    Smoking status: Current Every Day Smoker     Packs/day: 0.50     Years: 3.00     Pack years: 1.50     Types: Cigarettes     Start date: 2009    Smokeless tobacco: Current User     Types: Snuff   Substance and Sexual Activity    Alcohol use: No    Drug use: No    Sexual activity: Yes     Partners: Female     Birth control/protection: None   Other Topics Concern    Not on file   Social History Narrative    Not on file       ROS:  GENERAL: Reports chills and fatigue.   SKIN: No  rashes, itching or changes in color or texture of skin.  HEENT: Reports nasal congestion, postnasal drip dark mucus, headache, ears popping, hoarseness.   NODES: No masses or lesions. Denies swollen glands.  CHEST: Reports productive cough. & wheezing  CARDIOVASCULAR: Denies chest pain, shortness of breath  ABDOMEN: Appetite fair, No weight loss.  MUSCULOSKELETAL: reports no back pain.  NEUROLOGIC: No history of seizures, paralysis, alteration of gait or coordination.  PSYCHIATRIC: Chet mood swings, depression.    PE:   APPEARANCE: Well nourished, well developed, in moderate distress  V/S: Reviewed.  SKIN: Normal skin turgor, no lesions.  HEENT: Turbinates red, Minimal red pharynx, TMs poor light reflex bilateral.  CHEST:  Minimal expiratory wheezing  on auscultation.  CARDIOVASCULAR: Regular rate and rhythm.   MUSCULOSKELETAL: JAMES without difficulty  NEUROLOGIC: No sensory deficits. Gait & Posture: normal, No cerebellar signs.  MENTAL STATUS: Patient alert, oriented x 3 & conversant.    PLAN:   Nebulizer with Xopenex 1.25 for 15 and clinic now x 1  Drink plenty of clear fluids--at least 64 ounces of water/juice & rest  Simply saline nasal wash or flonase to irrigate sinuses and for congestion/runny nose.  Cool mist humidifier/vaporizer.  Meds: Doxycycline, prednisone, albuterol inhaler & phenergan dm / no refills  Practice good handwashing..  Mucinex for cough and chest congestion.  Tylenol  for fever, headache and body aches.  Warm salt water gargles for throat comfort.  Chloraseptic spray or lozenges for throat comfort.  Advise follow up with PCP  Advise go to ER if symptoms worsen or fail to improve with treatment.  AVS provided and reviewed with patient including supportive care, follow up, and red flag symptoms.   Patient verbalizes understanding and agrees with treatment plan. Discharged from Urgent Care in stable condition.      DIAGNOSIS:  Fatigue  Wheezing  Cough /asthma  Acute bacterial sinusitis

## 2018-12-22 NOTE — PATIENT INSTRUCTIONS
PLAN:   Nebulizer with Xopenex 1.25 for 15 and clinic now x 1  Drink plenty of clear fluids--at least 64 ounces of water/juice & rest  Simply saline nasal wash or flonase to irrigate sinuses and for congestion/runny nose.  Cool mist humidifier/vaporizer.  Meds: Doxycycline, prednisone, albuterol inhaler & phenergan dm / no refills  Practice good handwashing..  Mucinex for cough and chest congestion.  Tylenol  for fever, headache and body aches.  Warm salt water gargles for throat comfort.  Chloraseptic spray or lozenges for throat comfort.  Advise follow up with PCP  Advise go to ER if symptoms worsen or fail to improve with treatment.  AVS provided and reviewed with patient including supportive care, follow up, and red flag symptoms.   Patient verbalizes understanding and agrees with treatment plan. Discharged from Urgent Care in stable condition.

## 2019-01-18 ENCOUNTER — LAB VISIT (OUTPATIENT)
Dept: LAB | Facility: HOSPITAL | Age: 34
End: 2019-01-18
Attending: PHYSICAL MEDICINE & REHABILITATION
Payer: COMMERCIAL

## 2019-01-18 DIAGNOSIS — M54.50 LOW BACK PAIN: Primary | ICD-10-CM

## 2019-01-18 DIAGNOSIS — Z79.01 LONG TERM (CURRENT) USE OF ANTICOAGULANTS: ICD-10-CM

## 2019-01-18 LAB
ALBUMIN SERPL BCP-MCNC: 3.9 G/DL
ALP SERPL-CCNC: 93 U/L
ALT SERPL W/O P-5'-P-CCNC: 27 U/L
ANION GAP SERPL CALC-SCNC: 7 MMOL/L
APTT BLDCRRT: 26 SEC
AST SERPL-CCNC: 16 U/L
BASOPHILS # BLD AUTO: 0.02 K/UL
BASOPHILS NFR BLD: 0.4 %
BILIRUB SERPL-MCNC: 0.8 MG/DL
BUN SERPL-MCNC: 13 MG/DL
CALCIUM SERPL-MCNC: 9.6 MG/DL
CHLORIDE SERPL-SCNC: 105 MMOL/L
CO2 SERPL-SCNC: 28 MMOL/L
CREAT SERPL-MCNC: 0.9 MG/DL
DIFFERENTIAL METHOD: NORMAL
EOSINOPHIL # BLD AUTO: 0.1 K/UL
EOSINOPHIL NFR BLD: 2.1 %
ERYTHROCYTE [DISTWIDTH] IN BLOOD BY AUTOMATED COUNT: 13.1 %
EST. GFR  (AFRICAN AMERICAN): >60 ML/MIN/1.73 M^2
EST. GFR  (NON AFRICAN AMERICAN): >60 ML/MIN/1.73 M^2
GLUCOSE SERPL-MCNC: 97 MG/DL
HCT VFR BLD AUTO: 45.1 %
HGB BLD-MCNC: 15.2 G/DL
IMM GRANULOCYTES # BLD AUTO: 0.01 K/UL
IMM GRANULOCYTES NFR BLD AUTO: 0.2 %
INR PPP: 0.9
LYMPHOCYTES # BLD AUTO: 2.5 K/UL
LYMPHOCYTES NFR BLD: 44.5 %
MCH RBC QN AUTO: 29 PG
MCHC RBC AUTO-ENTMCNC: 33.7 G/DL
MCV RBC AUTO: 86 FL
MONOCYTES # BLD AUTO: 0.5 K/UL
MONOCYTES NFR BLD: 8.8 %
NEUTROPHILS # BLD AUTO: 2.5 K/UL
NEUTROPHILS NFR BLD: 44 %
NRBC BLD-RTO: 0 /100 WBC
PLATELET # BLD AUTO: 299 K/UL
PMV BLD AUTO: 9.6 FL
POTASSIUM SERPL-SCNC: 4.1 MMOL/L
PROT SERPL-MCNC: 7.3 G/DL
PROTHROMBIN TIME: 9.8 SEC
RBC # BLD AUTO: 5.25 M/UL
SODIUM SERPL-SCNC: 140 MMOL/L
WBC # BLD AUTO: 5.66 K/UL

## 2019-01-18 PROCEDURE — 85025 COMPLETE CBC W/AUTO DIFF WBC: CPT

## 2019-01-18 PROCEDURE — 80053 COMPREHEN METABOLIC PANEL: CPT

## 2019-01-18 PROCEDURE — 85730 THROMBOPLASTIN TIME PARTIAL: CPT

## 2019-01-18 PROCEDURE — 36415 COLL VENOUS BLD VENIPUNCTURE: CPT | Mod: PO

## 2019-01-18 PROCEDURE — 85610 PROTHROMBIN TIME: CPT

## 2019-03-02 ENCOUNTER — OFFICE VISIT (OUTPATIENT)
Dept: URGENT CARE | Facility: CLINIC | Age: 34
End: 2019-03-02
Payer: COMMERCIAL

## 2019-03-02 VITALS
DIASTOLIC BLOOD PRESSURE: 78 MMHG | SYSTOLIC BLOOD PRESSURE: 120 MMHG | BODY MASS INDEX: 33.02 KG/M2 | TEMPERATURE: 99 F | OXYGEN SATURATION: 98 % | WEIGHT: 243.81 LBS | HEART RATE: 96 BPM | HEIGHT: 72 IN

## 2019-03-02 DIAGNOSIS — B96.89 ACUTE BACTERIAL SINUSITIS: ICD-10-CM

## 2019-03-02 DIAGNOSIS — R05.9 COUGH: ICD-10-CM

## 2019-03-02 DIAGNOSIS — B96.89 BACTERIAL RESPIRATORY INFECTION: Primary | ICD-10-CM

## 2019-03-02 DIAGNOSIS — J98.8 BACTERIAL RESPIRATORY INFECTION: Primary | ICD-10-CM

## 2019-03-02 DIAGNOSIS — J01.90 ACUTE BACTERIAL SINUSITIS: ICD-10-CM

## 2019-03-02 DIAGNOSIS — R06.2 WHEEZING: ICD-10-CM

## 2019-03-02 PROCEDURE — 99214 OFFICE O/P EST MOD 30 MIN: CPT | Mod: S$GLB,,, | Performed by: NURSE PRACTITIONER

## 2019-03-02 PROCEDURE — 3008F BODY MASS INDEX DOCD: CPT | Mod: CPTII,S$GLB,, | Performed by: NURSE PRACTITIONER

## 2019-03-02 PROCEDURE — 99999 PR PBB SHADOW E&M-EST. PATIENT-LVL III: ICD-10-PCS | Mod: PBBFAC,,, | Performed by: NURSE PRACTITIONER

## 2019-03-02 PROCEDURE — 99214 PR OFFICE/OUTPT VISIT, EST, LEVL IV, 30-39 MIN: ICD-10-PCS | Mod: S$GLB,,, | Performed by: NURSE PRACTITIONER

## 2019-03-02 PROCEDURE — 3008F PR BODY MASS INDEX (BMI) DOCUMENTED: ICD-10-PCS | Mod: CPTII,S$GLB,, | Performed by: NURSE PRACTITIONER

## 2019-03-02 PROCEDURE — 99999 PR PBB SHADOW E&M-EST. PATIENT-LVL III: CPT | Mod: PBBFAC,,, | Performed by: NURSE PRACTITIONER

## 2019-03-02 RX ORDER — DOXYCYCLINE 100 MG/1
100 CAPSULE ORAL 2 TIMES DAILY
Qty: 20 CAPSULE | Refills: 0 | Status: SHIPPED | OUTPATIENT
Start: 2019-03-02 | End: 2019-03-12

## 2019-03-02 RX ORDER — MOMETASONE FUROATE 50 UG/1
2 SPRAY, METERED NASAL DAILY
COMMUNITY
End: 2019-06-20

## 2019-03-02 RX ORDER — PROMETHAZINE HYDROCHLORIDE AND DEXTROMETHORPHAN HYDROBROMIDE 6.25; 15 MG/5ML; MG/5ML
5 SYRUP ORAL
Qty: 180 ML | Refills: 0 | Status: SHIPPED | OUTPATIENT
Start: 2019-03-02 | End: 2019-03-12

## 2019-03-02 NOTE — PATIENT INSTRUCTIONS
Preventing Common Respiratory Infections  Respiratory infections such as colds and influenza (the flu) are common in winter. These infections are often caused by viruses. They may share some symptoms, but not all respiratory infections are the same. Some make you more sick than others. You can take steps to prevent common respiratory infections. And if you get sick, you can take care of yourself to keep the infection from getting worse.    What is a cold?  · Symptoms include runny nose, coughing and sneezing, and sore throat. Cold symptoms tend to be milder than flu symptoms.  · Symptoms tend to come on slowly. They last for a few days to about a week.  · With a cold, you can still do most of the things you usually do.  What is the flu?  · Symptoms include fever, headache, fatigue, cough, sore throat, runny nose, and muscle aches. Children may have upset stomach and vomiting, but adults usually dont.  · Symptoms tend to come on quickly. Some, such as fatigue and cough, can last a few weeks.  · With the flu, you may feel worn out and not able to do normal activities.  · Its most likely NOT the flu if an adult has vomiting or diarrhea for a day or two. This so-called stomach flu is probably a GI (gastrointestinal) infection.  When the infection gets worse  Without proper care, a respiratory infection can get worse. It can lead to serious complications and death. If you arent getting better, call your healthcare provider. Complications can include:  · Bronchitis (infection of the airways that leads to shortness of breath and coughing up thick yellow or green mucus)  · Pneumonia (infection of the lungs in which fluid and mucus settle in the lungs, making breathing difficult)  · Worsening of chronic conditions such as heart failure, chronic lung disease, asthma, or diabetes  · Severe dehydration (loss of fluids)  · Sinus problems  · Ear infections   Get a flu vaccine  A flu vaccine protects you from influenza  (but not other colds or infections). Get a vaccine each fall, before flu season starts. This can be done at a clinic, healthcare providers office, drugstore, senior center, or through your workplace.  Get pneumococcal vaccines  Pneumonia can be a complication of influenza. There are 2 pneumococcal pneumonia vaccines that protect against many types of pneumonia. Talk with your healthcare provider about these important vaccines.   Keep germs from spreading  No one likes getting sick. To protect yourself and others from cold and flu germs:  · Wash your hands often. Use alcohol-based hand  when you dont have access to soap and water.  · Dont touch your eyes, nose, and mouth. This may help you keep germs out of your body.  · Try to avoid people with respiratory infections. You may want to stay out of crowds during flu season (winter).  · Ask your healthcare provider if you should get a pneumonia vaccination.  How to wash your hands  · Use warm water and plenty of soap. Work up a good lather.  · Clean your whole hand, under your nails, between your fingers, and up your wrists. Wash for at least 15 to 20 seconds. Dont just wipe--rub well.  · Rinse. Let the water run down your fingertips, not up your wrists.  · In a public restroom, use a paper towel to turn off the faucet and open the door.   Date Last Reviewed: 12/1/2016  © 8250-2481 [employer]. [address] [place] [address], [place] [address]. All rights reserved. This information is not intended as a substitute for professional medical care. Always follow your healthcare professional's instructions.

## 2019-03-02 NOTE — PROGRESS NOTES
Subjective:      Patient ID: Nato Ng is a 33 y.o. male.    Chief Complaint: Nasal Congestion      URI    This is a new problem. The current episode started in the past 7 days. The problem has been gradually worsening. There has been no fever. Associated symptoms include congestion (head), coughing and a sore throat. Pertinent negatives include no abdominal pain, chest pain, diarrhea, ear pain, headaches, nausea, rash, rhinorrhea, sinus pain, sneezing, vomiting or wheezing. Treatments tried: xyzal, nasacort.     Review of Systems   Constitutional: Negative for activity change, appetite change, chills, diaphoresis, fatigue, fever and unexpected weight change.   HENT: Positive for congestion (head), sinus pressure and sore throat. Negative for ear discharge, ear pain, postnasal drip, rhinorrhea, sinus pain and sneezing.    Eyes: Negative.    Respiratory: Positive for cough. Negative for chest tightness, shortness of breath and wheezing.    Cardiovascular: Negative for chest pain and palpitations.   Gastrointestinal: Negative for abdominal pain, diarrhea, nausea and vomiting.   Endocrine: Negative.    Genitourinary: Negative.    Musculoskeletal: Negative for arthralgias and myalgias.   Skin: Negative for rash.   Allergic/Immunologic: Negative for environmental allergies and food allergies.   Neurological: Negative for dizziness, weakness, light-headedness and headaches.   Hematological: Negative for adenopathy.   Psychiatric/Behavioral: Negative for agitation.        Objective:     Vitals:    03/02/19 1144   BP: 120/78   Pulse: 96   Temp: 98.5 °F (36.9 °C)     Physical Exam   Constitutional: He is oriented to person, place, and time. Vital signs are normal. He appears well-developed and well-nourished. He is cooperative. He appears ill. No distress.   HENT:   Head: Normocephalic.   Right Ear: Hearing, tympanic membrane, external ear and ear canal normal.   Left Ear: Hearing, tympanic membrane, external ear and  ear canal normal.   Nose: Nose normal. Right sinus exhibits no maxillary sinus tenderness and no frontal sinus tenderness. Left sinus exhibits no maxillary sinus tenderness and no frontal sinus tenderness.   Mouth/Throat: Uvula is midline and mucous membranes are normal. No oral lesions. No uvula swelling. Posterior oropharyngeal erythema (mild) present. No oropharyngeal exudate, posterior oropharyngeal edema or tonsillar abscesses.   Eyes: Conjunctivae, EOM and lids are normal. Pupils are equal, round, and reactive to light. Right eye exhibits no discharge (watery). Left eye exhibits no discharge (watery).   Neck: Normal range of motion and full passive range of motion without pain. Neck supple.   Cardiovascular: Normal rate, regular rhythm and normal heart sounds.   Pulmonary/Chest: Effort normal. No accessory muscle usage. No tachypnea and no bradypnea. No respiratory distress. He has rhonchi (mild throughout).   Musculoskeletal: Normal range of motion.   Lymphadenopathy:        Head (right side): No submandibular and no tonsillar adenopathy present.        Head (left side): No submandibular and no tonsillar adenopathy present.     He has no cervical adenopathy.   Neurological: He is alert and oriented to person, place, and time.   Skin: Skin is warm, dry and intact. Capillary refill takes less than 2 seconds. No bruising, no ecchymosis, no lesion, no petechiae and no rash noted. He is not diaphoretic. No erythema. No pallor.   Nursing note and vitals reviewed.      Assessment:     1. Bacterial respiratory infection    2. Cough    3. Wheezing    4. Acute bacterial sinusitis        Plan:     Nato was seen today for nasal congestion.    Diagnoses and all orders for this visit:    Bacterial respiratory infection    Cough  -     promethazine-dextromethorphan (PROMETHAZINE-DM) 6.25-15 mg/5 mL Syrp; Take 5 mLs by mouth every 6 to 8 hours as needed.    Wheezing  -     promethazine-dextromethorphan (PROMETHAZINE-DM)  6.25-15 mg/5 mL Syrp; Take 5 mLs by mouth every 6 to 8 hours as needed.    Acute bacterial sinusitis  -     promethazine-dextromethorphan (PROMETHAZINE-DM) 6.25-15 mg/5 mL Syrp; Take 5 mLs by mouth every 6 to 8 hours as needed.    Other orders  -     doxycycline (VIBRAMYCIN) 100 MG Cap; Take 1 capsule (100 mg total) by mouth 2 (two) times daily. for 10 days    Antibiotic Therapy  Take antibiotics for entire course.  Do not save medications for later, all medications must be taken for full regimen.  Rest  Drink plenty of clear fluids  Nasal saline spray to clear nasal drainage and help with nasal congestion  Continue Xyzal to help dry mucus and post nasal drip  Mucinex or Mucinex DM for cough and chest congestion  Tylenol or Ibuprofen for fever, headache and body aches    - Follow up with Primary Care Provider in 2-3 days or sooner if needed.              Go to ER immediately if severe allergic response experienced such as difficulty breathing, facial swelling, throat swelling.    See Primary Care Physician or go to ER if symptoms worsen of fail to improve with treatment.       DINA Loza, FNP-C

## 2019-03-20 ENCOUNTER — PATIENT MESSAGE (OUTPATIENT)
Dept: ORTHOPEDICS | Facility: CLINIC | Age: 34
End: 2019-03-20

## 2019-03-20 RX ORDER — AZELASTINE 1 MG/ML
SPRAY, METERED NASAL
Qty: 30 ML | Refills: 0 | Status: SHIPPED | OUTPATIENT
Start: 2019-03-20 | End: 2019-08-20

## 2019-04-12 ENCOUNTER — OFFICE VISIT (OUTPATIENT)
Dept: FAMILY MEDICINE | Facility: CLINIC | Age: 34
End: 2019-04-12
Payer: COMMERCIAL

## 2019-04-12 VITALS
BODY MASS INDEX: 34.13 KG/M2 | DIASTOLIC BLOOD PRESSURE: 67 MMHG | OXYGEN SATURATION: 98 % | HEIGHT: 72 IN | WEIGHT: 252 LBS | SYSTOLIC BLOOD PRESSURE: 136 MMHG | TEMPERATURE: 96 F | HEART RATE: 89 BPM

## 2019-04-12 DIAGNOSIS — F41.9 ANXIETY: Primary | ICD-10-CM

## 2019-04-12 DIAGNOSIS — Z72.0 TOBACCO ABUSE: ICD-10-CM

## 2019-04-12 DIAGNOSIS — G47.00 INSOMNIA, UNSPECIFIED TYPE: ICD-10-CM

## 2019-04-12 PROCEDURE — 3008F BODY MASS INDEX DOCD: CPT | Mod: CPTII,S$GLB,, | Performed by: FAMILY MEDICINE

## 2019-04-12 PROCEDURE — 99999 PR PBB SHADOW E&M-EST. PATIENT-LVL III: CPT | Mod: PBBFAC,,, | Performed by: FAMILY MEDICINE

## 2019-04-12 PROCEDURE — 99999 PR PBB SHADOW E&M-EST. PATIENT-LVL III: ICD-10-PCS | Mod: PBBFAC,,, | Performed by: FAMILY MEDICINE

## 2019-04-12 PROCEDURE — 3008F PR BODY MASS INDEX (BMI) DOCUMENTED: ICD-10-PCS | Mod: CPTII,S$GLB,, | Performed by: FAMILY MEDICINE

## 2019-04-12 PROCEDURE — 99214 PR OFFICE/OUTPT VISIT, EST, LEVL IV, 30-39 MIN: ICD-10-PCS | Mod: S$GLB,,, | Performed by: FAMILY MEDICINE

## 2019-04-12 PROCEDURE — 99214 OFFICE O/P EST MOD 30 MIN: CPT | Mod: S$GLB,,, | Performed by: FAMILY MEDICINE

## 2019-04-12 RX ORDER — VENLAFAXINE HYDROCHLORIDE 75 MG/1
75 CAPSULE, EXTENDED RELEASE ORAL DAILY
Qty: 30 CAPSULE | Refills: 1 | Status: SHIPPED | OUTPATIENT
Start: 2019-04-12 | End: 2019-08-20

## 2019-04-12 NOTE — PROGRESS NOTES
Subjective:       Patient ID: Nato Ng is a 33 y.o. male.    Chief Complaint: Anxiety      HPI Comments:       Current Outpatient Medications:     azelastine (ASTELIN) 137 mcg (0.1 %) nasal spray, USE 1 SPRAY(137 MCG) IN EACH NOSTRIL TWICE DAILY, Disp: 30 mL, Rfl: 0    levocetirizine (XYZAL) 5 MG tablet, Take 1 tablet (5 mg total) by mouth every evening., Disp: 30 tablet, Rfl: 11    mometasone (NASONEX) 50 mcg/actuation nasal spray, 2 sprays by Nasal route once daily., Disp: , Rfl:     albuterol (PROVENTIL/VENTOLIN HFA) 90 mcg/actuation inhaler, Inhale 2 puffs into the lungs every 4 (four) hours as needed for Wheezing., Disp: 1 Inhaler, Rfl: 1    gabapentin (NEURONTIN) 300 MG capsule, Take 3 capsules (900 mg total) by mouth every evening. Take 45 min to 1 hour before bed as may cause drowsiness, Disp: 90 capsule, Rfl: 1    venlafaxine (EFFEXOR-XR) 75 MG 24 hr capsule, Take 1 capsule (75 mg total) by mouth once daily., Disp: 30 capsule, Rfl: 1    Current Facility-Administered Medications:     methylPREDNISolone acetate injection 80 mg, 80 mg, Intramuscular, Once, Bee Bentley, NP      Feeling much more anxious over the last 6 months or so.  Has a panic attack about once a month, consisting of chest pain or shortness of breath.  No SI/HI.  Says his mood is up and down but does not feel like he is depressed.  Feels anxious and fidgety much of the time.  Anger outbursts.  Still on shift work sleep cycles.  Sleep is fair to poor.  Has gained the weight back this year that he lost last year.  This occurred when he was had late up from his orthopedic injuries.  Also started smoking again.  I see that he has been in for several acute respiratory problems over the past several months.  Never been treated with antidepressants or anxiety medicines previously    Review of Systems   Constitutional: Negative for activity change, appetite change and fever.   HENT: Negative for sore throat.    Respiratory:  Negative for cough and shortness of breath.    Cardiovascular: Negative for chest pain.   Gastrointestinal: Negative for abdominal pain, diarrhea and nausea.   Genitourinary: Negative for difficulty urinating.   Musculoskeletal: Negative for arthralgias and myalgias.   Neurological: Negative for dizziness and headaches.   Psychiatric/Behavioral: Positive for agitation and sleep disturbance. Negative for dysphoric mood, self-injury and suicidal ideas. The patient is nervous/anxious.        Objective:      Vitals:    04/12/19 0746   BP: 136/67   Pulse: 89   Temp: 96.3 °F (35.7 °C)   SpO2: 98%   Weight: 114.3 kg (251 lb 15.8 oz)   Height: 6' (1.829 m)   PainSc: 0-No pain     Physical Exam   Constitutional: He is oriented to person, place, and time. He appears well-developed and well-nourished. No distress.   HENT:   Head: Normocephalic.   Mouth/Throat: No oropharyngeal exudate.   Neck: Neck supple. No thyromegaly present.   Cardiovascular: Normal rate, regular rhythm and normal heart sounds.   No murmur heard.  Pulmonary/Chest: Effort normal and breath sounds normal. He has no wheezes. He has no rales.   Abdominal: Soft. He exhibits no distension.   Musculoskeletal: He exhibits no edema.   Lymphadenopathy:     He has no cervical adenopathy.   Neurological: He is alert and oriented to person, place, and time.   Skin: Skin is warm and dry. He is not diaphoretic.   Psychiatric: He has a normal mood and affect. His behavior is normal. Judgment and thought content normal.   Nursing note and vitals reviewed.      Assessment:       1. Anxiety    2. Tobacco abuse    3. Insomnia, unspecified type        Plan:   Anxiety  Comments:  Trial of Effexor.  Follow up 1 month    Tobacco abuse  Comments:  Quit previously.  Recently restarted.  Will address this at follow-up.  ?  Wellbutrin    Insomnia, unspecified type  Comments:  Follow-up in 1 month to assess effect of treating anxiety    Other orders  -     venlafaxine (EFFEXOR-XR) 75  MG 24 hr capsule; Take 1 capsule (75 mg total) by mouth once daily.  Dispense: 30 capsule; Refill: 1

## 2019-04-14 DIAGNOSIS — R52 PAIN: ICD-10-CM

## 2019-04-14 RX ORDER — MELOXICAM 15 MG/1
TABLET ORAL
Qty: 30 TABLET | Refills: 0 | Status: SHIPPED | OUTPATIENT
Start: 2019-04-14 | End: 2019-05-13 | Stop reason: SDUPTHER

## 2019-05-11 DIAGNOSIS — R52 PAIN: ICD-10-CM

## 2019-05-13 RX ORDER — MELOXICAM 15 MG/1
TABLET ORAL
Qty: 30 TABLET | Refills: 0 | Status: SHIPPED | OUTPATIENT
Start: 2019-05-13 | End: 2019-10-02 | Stop reason: SDUPTHER

## 2019-05-17 ENCOUNTER — OFFICE VISIT (OUTPATIENT)
Dept: FAMILY MEDICINE | Facility: CLINIC | Age: 34
End: 2019-05-17
Payer: COMMERCIAL

## 2019-05-17 VITALS
OXYGEN SATURATION: 94 % | BODY MASS INDEX: 34.31 KG/M2 | HEIGHT: 72 IN | DIASTOLIC BLOOD PRESSURE: 74 MMHG | HEART RATE: 96 BPM | TEMPERATURE: 97 F | WEIGHT: 253.31 LBS | SYSTOLIC BLOOD PRESSURE: 140 MMHG

## 2019-05-17 DIAGNOSIS — R68.82 LOSS OF LIBIDO: ICD-10-CM

## 2019-05-17 DIAGNOSIS — Z72.0 TOBACCO ABUSE: ICD-10-CM

## 2019-05-17 DIAGNOSIS — S39.012A STRAIN OF LUMBAR REGION, INITIAL ENCOUNTER: ICD-10-CM

## 2019-05-17 DIAGNOSIS — R03.0 BLOOD PRESSURE ELEVATED WITHOUT HISTORY OF HTN: ICD-10-CM

## 2019-05-17 DIAGNOSIS — F41.9 ANXIETY: Primary | ICD-10-CM

## 2019-05-17 DIAGNOSIS — G47.00 INSOMNIA, UNSPECIFIED TYPE: ICD-10-CM

## 2019-05-17 PROCEDURE — 3008F PR BODY MASS INDEX (BMI) DOCUMENTED: ICD-10-PCS | Mod: CPTII,S$GLB,, | Performed by: FAMILY MEDICINE

## 2019-05-17 PROCEDURE — 99214 PR OFFICE/OUTPT VISIT, EST, LEVL IV, 30-39 MIN: ICD-10-PCS | Mod: 25,S$GLB,, | Performed by: FAMILY MEDICINE

## 2019-05-17 PROCEDURE — 99999 PR PBB SHADOW E&M-EST. PATIENT-LVL III: ICD-10-PCS | Mod: PBBFAC,,, | Performed by: FAMILY MEDICINE

## 2019-05-17 PROCEDURE — 99999 PR PBB SHADOW E&M-EST. PATIENT-LVL III: CPT | Mod: PBBFAC,,, | Performed by: FAMILY MEDICINE

## 2019-05-17 PROCEDURE — 96372 PR INJECTION,THERAP/PROPH/DIAG2ST, IM OR SUBCUT: ICD-10-PCS | Mod: S$GLB,,, | Performed by: FAMILY MEDICINE

## 2019-05-17 PROCEDURE — 99214 OFFICE O/P EST MOD 30 MIN: CPT | Mod: 25,S$GLB,, | Performed by: FAMILY MEDICINE

## 2019-05-17 PROCEDURE — 3008F BODY MASS INDEX DOCD: CPT | Mod: CPTII,S$GLB,, | Performed by: FAMILY MEDICINE

## 2019-05-17 PROCEDURE — 96372 THER/PROPH/DIAG INJ SC/IM: CPT | Mod: S$GLB,,, | Performed by: FAMILY MEDICINE

## 2019-05-17 RX ORDER — KETOROLAC TROMETHAMINE 30 MG/ML
60 INJECTION, SOLUTION INTRAMUSCULAR; INTRAVENOUS ONCE
Status: COMPLETED | OUTPATIENT
Start: 2019-05-17 | End: 2019-05-17

## 2019-05-17 RX ORDER — BUPROPION HYDROCHLORIDE 150 MG/1
150 TABLET ORAL DAILY
Qty: 30 TABLET | Refills: 1 | Status: SHIPPED | OUTPATIENT
Start: 2019-05-17 | End: 2019-07-09 | Stop reason: SDUPTHER

## 2019-05-17 RX ADMIN — KETOROLAC TROMETHAMINE 60 MG: 30 INJECTION, SOLUTION INTRAMUSCULAR; INTRAVENOUS at 08:05

## 2019-05-17 NOTE — PROGRESS NOTES
Subjective:       Patient ID: Nato Ng is a 34 y.o. male.    Chief Complaint: Follow-up      HPI Comments:       Current Outpatient Medications:     levocetirizine (XYZAL) 5 MG tablet, Take 1 tablet (5 mg total) by mouth every evening., Disp: 30 tablet, Rfl: 11    meloxicam (MOBIC) 15 MG tablet, TAKE 1 TABLET(15 MG) BY MOUTH EVERY DAY, Disp: 30 tablet, Rfl: 0    venlafaxine (EFFEXOR-XR) 75 MG 24 hr capsule, Take 1 capsule (75 mg total) by mouth once daily., Disp: 30 capsule, Rfl: 1    albuterol (PROVENTIL/VENTOLIN HFA) 90 mcg/actuation inhaler, Inhale 2 puffs into the lungs every 4 (four) hours as needed for Wheezing., Disp: 1 Inhaler, Rfl: 1    azelastine (ASTELIN) 137 mcg (0.1 %) nasal spray, USE 1 SPRAY(137 MCG) IN EACH NOSTRIL TWICE DAILY, Disp: 30 mL, Rfl: 0    buPROPion (WELLBUTRIN XL) 150 MG TB24 tablet, Take 1 tablet (150 mg total) by mouth once daily., Disp: 30 tablet, Rfl: 1    gabapentin (NEURONTIN) 300 MG capsule, Take 3 capsules (900 mg total) by mouth every evening. Take 45 min to 1 hour before bed as may cause drowsiness, Disp: 90 capsule, Rfl: 1    mometasone (NASONEX) 50 mcg/actuation nasal spray, 2 sprays by Nasal route once daily., Disp: , Rfl:     Current Facility-Administered Medications:     ketorolac injection 60 mg, 60 mg, Intramuscular, Once, Domingo Cohn MD    methylPREDNISolone acetate injection 80 mg, 80 mg, Intramuscular, Once, Bee Bentley NP      Follow-up on anxiety.  Started taking Effexor which really helped even out as far as his mood swings and anger outbursts.  However it has caused problems with this sex drive, above and beyond when he was having before.  Also seems to be sleeping worse, even though he had to change it from morning to night because it made him drowsy.  Also has had some twitching.  However the most significant side effect is that he feels much more tired overall.  Still having some sleeping difficulty but admits seems to be much worse  as far as tiredness is concerned.  Mood is 9/10.  Still smoking.    Complains of new low back pain that started in the last 24 hr.  Bilateral.  Paraspinal.  No radiation to the legs.  Not the same as his previous back problems related to disc disease. No radiation down his legs.  Has not taken anything for it.    Review of Systems   Constitutional: Negative for activity change and unexpected weight change.   HENT: Negative for hearing loss, rhinorrhea and trouble swallowing.    Eyes: Negative for discharge and visual disturbance.   Respiratory: Negative for chest tightness and wheezing.    Cardiovascular: Negative for chest pain and palpitations.   Gastrointestinal: Negative for blood in stool, constipation, diarrhea and vomiting.   Endocrine: Negative for polydipsia and polyuria.   Genitourinary: Negative for difficulty urinating, hematuria and urgency.   Musculoskeletal: Positive for back pain. Negative for arthralgias, joint swelling and neck pain.   Neurological: Negative for weakness and headaches.   Psychiatric/Behavioral: Positive for sleep disturbance. Negative for confusion, dysphoric mood, self-injury and suicidal ideas. The patient is nervous/anxious.        Objective:      Vitals:    05/17/19 0749   BP: (!) 140/74   Pulse: 96   Temp: 97.4 °F (36.3 °C)   SpO2: (!) 94%   Weight: 114.9 kg (253 lb 4.9 oz)   Height: 6' (1.829 m)   PainSc: 0-No pain     Physical Exam   Constitutional: He is oriented to person, place, and time. He appears well-developed and well-nourished. No distress.   HENT:   Head: Normocephalic.   Mouth/Throat: No oropharyngeal exudate.   Neck: Neck supple. No thyromegaly present.   Cardiovascular: Normal rate, regular rhythm and normal heart sounds.   No murmur heard.  Pulmonary/Chest: Effort normal and breath sounds normal. He has no wheezes. He has no rales.   Abdominal: Soft. He exhibits no distension.   Musculoskeletal: He exhibits no edema.        Lumbar back: He exhibits decreased  range of motion, tenderness, pain and spasm. He exhibits no bony tenderness.        Back:    Lymphadenopathy:     He has no cervical adenopathy.   Neurological: He is alert and oriented to person, place, and time.   Skin: Skin is warm and dry. He is not diaphoretic.   Psychiatric: He has a normal mood and affect. His behavior is normal. Judgment and thought content normal.   Nursing note and vitals reviewed.      Assessment:       1. Anxiety    2. Insomnia, unspecified type    3. Strain of lumbar region, initial encounter    4. Loss of libido    5. Tobacco abuse    6. Blood pressure elevated without history of HTN        Plan:   Anxiety  Comments:  Helped by fax about side effect profile on susceptible.  Discontinue and start Wellbutrin daily.  Follow-up in 1 month    Insomnia, unspecified type  Comments:  Chronic.    Strain of lumbar region, initial encounter  Comments:  Toradol IM now.  Ibuprofen as needed  Orders:  -     ketorolac injection 60 mg    Loss of libido  Comments:  Made worsened by Effexor.  Start Wellbutrin as above    Tobacco abuse  Comments:  Wellbutrin as above    Blood pressure elevated without history of HTN  Comments:  Recheck at follow-up    Other orders  -     buPROPion (WELLBUTRIN XL) 150 MG TB24 tablet; Take 1 tablet (150 mg total) by mouth once daily.  Dispense: 30 tablet; Refill: 1

## 2019-06-16 ENCOUNTER — OFFICE VISIT (OUTPATIENT)
Dept: URGENT CARE | Facility: CLINIC | Age: 34
End: 2019-06-16
Payer: COMMERCIAL

## 2019-06-16 VITALS
TEMPERATURE: 100 F | DIASTOLIC BLOOD PRESSURE: 86 MMHG | OXYGEN SATURATION: 99 % | BODY MASS INDEX: 35.73 KG/M2 | WEIGHT: 263.44 LBS | SYSTOLIC BLOOD PRESSURE: 140 MMHG | RESPIRATION RATE: 20 BRPM | HEART RATE: 99 BPM

## 2019-06-16 DIAGNOSIS — J02.9 PHARYNGITIS, UNSPECIFIED ETIOLOGY: Primary | ICD-10-CM

## 2019-06-16 PROCEDURE — 96372 THER/PROPH/DIAG INJ SC/IM: CPT | Mod: S$GLB,,, | Performed by: NURSE PRACTITIONER

## 2019-06-16 PROCEDURE — 99214 OFFICE O/P EST MOD 30 MIN: CPT | Mod: 25,S$GLB,, | Performed by: NURSE PRACTITIONER

## 2019-06-16 PROCEDURE — 99214 PR OFFICE/OUTPT VISIT, EST, LEVL IV, 30-39 MIN: ICD-10-PCS | Mod: 25,S$GLB,, | Performed by: NURSE PRACTITIONER

## 2019-06-16 PROCEDURE — 99999 PR PBB SHADOW E&M-EST. PATIENT-LVL IV: ICD-10-PCS | Mod: PBBFAC,,, | Performed by: NURSE PRACTITIONER

## 2019-06-16 PROCEDURE — 3008F PR BODY MASS INDEX (BMI) DOCUMENTED: ICD-10-PCS | Mod: CPTII,S$GLB,, | Performed by: NURSE PRACTITIONER

## 2019-06-16 PROCEDURE — 3008F BODY MASS INDEX DOCD: CPT | Mod: CPTII,S$GLB,, | Performed by: NURSE PRACTITIONER

## 2019-06-16 PROCEDURE — 99999 PR PBB SHADOW E&M-EST. PATIENT-LVL IV: CPT | Mod: PBBFAC,,, | Performed by: NURSE PRACTITIONER

## 2019-06-16 PROCEDURE — 96372 PR INJECTION,THERAP/PROPH/DIAG2ST, IM OR SUBCUT: ICD-10-PCS | Mod: S$GLB,,, | Performed by: NURSE PRACTITIONER

## 2019-06-16 RX ORDER — BETAMETHASONE SODIUM PHOSPHATE AND BETAMETHASONE ACETATE 3; 3 MG/ML; MG/ML
12 INJECTION, SUSPENSION INTRA-ARTICULAR; INTRALESIONAL; INTRAMUSCULAR; SOFT TISSUE ONCE
Status: COMPLETED | OUTPATIENT
Start: 2019-06-16 | End: 2019-06-16

## 2019-06-16 RX ADMIN — BETAMETHASONE SODIUM PHOSPHATE AND BETAMETHASONE ACETATE 12 MG: 3; 3 INJECTION, SUSPENSION INTRA-ARTICULAR; INTRALESIONAL; INTRAMUSCULAR; SOFT TISSUE at 01:06

## 2019-06-16 NOTE — PATIENT INSTRUCTIONS

## 2019-06-16 NOTE — PROGRESS NOTES
Subjective:       Patient ID: Nato Ng is a 34 y.o. male.    Chief Complaint: Nasal Congestion    Sore Throat    This is a new problem. The current episode started yesterday. The problem has been gradually worsening. Maximum temperature: 99.8. The pain is at a severity of 6/10. Associated symptoms include congestion. Pertinent negatives include no abdominal pain, coughing, diarrhea, headaches, shortness of breath, trouble swallowing or vomiting. Associated symptoms comments: Throat dry and burning. He has had no exposure to strep or mono. Treatments tried: mucinex, advil, benadryl sinus and congestion. The treatment provided no relief.     Review of Systems   Constitutional: Positive for fever (low grade). Negative for appetite change, chills, diaphoresis and fatigue.   HENT: Positive for congestion and sore throat. Negative for rhinorrhea and trouble swallowing.    Eyes: Negative.    Respiratory: Negative for cough, chest tightness, shortness of breath and wheezing.    Cardiovascular: Negative for chest pain and palpitations.   Gastrointestinal: Negative for abdominal pain, diarrhea and vomiting.   Endocrine: Negative for cold intolerance and heat intolerance.   Genitourinary: Negative for decreased urine volume.   Musculoskeletal: Negative for myalgias.   Skin: Negative for rash.   Allergic/Immunologic: Negative for environmental allergies and food allergies.   Neurological: Negative for dizziness, weakness, light-headedness and headaches.   Hematological: Negative for adenopathy.   Psychiatric/Behavioral: Negative for agitation.       Objective:      Physical Exam   Constitutional: He is oriented to person, place, and time. Vital signs are normal. He appears well-developed and well-nourished. He is cooperative. No distress.   HENT:   Head: Normocephalic.   Right Ear: Hearing, tympanic membrane, external ear and ear canal normal.   Left Ear: Hearing, tympanic membrane, external ear and ear canal normal.    Nose: Nose normal. Right sinus exhibits no maxillary sinus tenderness and no frontal sinus tenderness. Left sinus exhibits no maxillary sinus tenderness and no frontal sinus tenderness.   Mouth/Throat: Uvula is midline and mucous membranes are normal. No oral lesions. No uvula swelling. Posterior oropharyngeal erythema (mild) present. No oropharyngeal exudate or posterior oropharyngeal edema. Tonsils are 1+ on the right. Tonsils are 1+ on the left. No tonsillar exudate.   Eyes: Right eye exhibits no discharge. Left eye exhibits no discharge.   Neck: Normal range of motion and full passive range of motion without pain. Neck supple. No tracheal tenderness present.   Cardiovascular: Normal rate, regular rhythm and normal heart sounds.   Pulmonary/Chest: Effort normal and breath sounds normal. No respiratory distress.   Musculoskeletal: Normal range of motion.   Lymphadenopathy:        Head (right side): No submandibular and no tonsillar adenopathy present.        Head (left side): No submandibular and no tonsillar adenopathy present.     He has no cervical adenopathy.   Neurological: He is alert and oriented to person, place, and time.   Skin: Skin is warm, dry and intact. Capillary refill takes less than 2 seconds. No rash noted. He is not diaphoretic. No pallor.   Nursing note and vitals reviewed.      Assessment:       1. Pharyngitis, unspecified etiology        Plan:         Nato was seen today for nasal congestion.    Diagnoses and all orders for this visit:    Pharyngitis, unspecified etiology  -     betamethasone acetate-betamethasone sodium phosphate injection 12 mg    Continue Xyzal and mucinex as advised  Lots of fluids  Rest  Drink plenty of clear fluids  Tylenol or Ibuprofen for throat pain  Warm salt water gargles for throat comfort  Chloraseptic spray or lozenges for throat comfort  See Primary Care Physician or go to ER if symptoms worsen of fail to improve with treatment.

## 2019-06-16 NOTE — PROGRESS NOTES
After obtaining consent, and per orders of JOSEFINA Phillips NP , injection of see mar given by Mireya Waller. Patient instructed to remain in clinic for 20 minutes afterwards, and to report any adverse reaction to me immediately.

## 2019-06-20 ENCOUNTER — PATIENT MESSAGE (OUTPATIENT)
Dept: FAMILY MEDICINE | Facility: CLINIC | Age: 34
End: 2019-06-20

## 2019-06-20 ENCOUNTER — OFFICE VISIT (OUTPATIENT)
Dept: FAMILY MEDICINE | Facility: CLINIC | Age: 34
End: 2019-06-20
Payer: COMMERCIAL

## 2019-06-20 VITALS
OXYGEN SATURATION: 96 % | WEIGHT: 265.19 LBS | BODY MASS INDEX: 35.92 KG/M2 | DIASTOLIC BLOOD PRESSURE: 88 MMHG | SYSTOLIC BLOOD PRESSURE: 134 MMHG | HEIGHT: 72 IN | TEMPERATURE: 97 F | HEART RATE: 76 BPM

## 2019-06-20 DIAGNOSIS — N52.9 ERECTILE DYSFUNCTION, UNSPECIFIED ERECTILE DYSFUNCTION TYPE: Primary | ICD-10-CM

## 2019-06-20 DIAGNOSIS — Z72.0 TOBACCO ABUSE: ICD-10-CM

## 2019-06-20 DIAGNOSIS — G47.00 INSOMNIA, UNSPECIFIED TYPE: ICD-10-CM

## 2019-06-20 DIAGNOSIS — F41.9 ANXIETY: ICD-10-CM

## 2019-06-20 PROCEDURE — 99214 OFFICE O/P EST MOD 30 MIN: CPT | Mod: S$GLB,,, | Performed by: FAMILY MEDICINE

## 2019-06-20 PROCEDURE — 3008F PR BODY MASS INDEX (BMI) DOCUMENTED: ICD-10-PCS | Mod: CPTII,S$GLB,, | Performed by: FAMILY MEDICINE

## 2019-06-20 PROCEDURE — 99214 PR OFFICE/OUTPT VISIT, EST, LEVL IV, 30-39 MIN: ICD-10-PCS | Mod: S$GLB,,, | Performed by: FAMILY MEDICINE

## 2019-06-20 PROCEDURE — 99999 PR PBB SHADOW E&M-EST. PATIENT-LVL III: CPT | Mod: PBBFAC,,, | Performed by: FAMILY MEDICINE

## 2019-06-20 PROCEDURE — 3008F BODY MASS INDEX DOCD: CPT | Mod: CPTII,S$GLB,, | Performed by: FAMILY MEDICINE

## 2019-06-20 PROCEDURE — 99999 PR PBB SHADOW E&M-EST. PATIENT-LVL III: ICD-10-PCS | Mod: PBBFAC,,, | Performed by: FAMILY MEDICINE

## 2019-06-20 RX ORDER — SILDENAFIL 50 MG/1
TABLET, FILM COATED ORAL
Qty: 6 TABLET | Refills: 1 | Status: SHIPPED | OUTPATIENT
Start: 2019-06-20 | End: 2019-06-20 | Stop reason: SDUPTHER

## 2019-06-20 RX ORDER — FLUTICASONE PROPIONATE 50 MCG
1 SPRAY, SUSPENSION (ML) NASAL DAILY
Qty: 1 BOTTLE | Refills: 5 | Status: SHIPPED | OUTPATIENT
Start: 2019-06-20 | End: 2021-08-20 | Stop reason: SDUPTHER

## 2019-06-20 RX ORDER — SILDENAFIL 50 MG/1
TABLET, FILM COATED ORAL
Qty: 6 TABLET | Refills: 1 | Status: SHIPPED | OUTPATIENT
Start: 2019-06-20 | End: 2020-09-30

## 2019-06-20 NOTE — PROGRESS NOTES
Subjective:       Patient ID: Nato Ng is a 34 y.o. male.    Chief Complaint: Follow-up      HPI Comments:       Current Outpatient Medications:     azelastine (ASTELIN) 137 mcg (0.1 %) nasal spray, USE 1 SPRAY(137 MCG) IN EACH NOSTRIL TWICE DAILY, Disp: 30 mL, Rfl: 0    buPROPion (WELLBUTRIN XL) 150 MG TB24 tablet, Take 1 tablet (150 mg total) by mouth once daily., Disp: 30 tablet, Rfl: 1    levocetirizine (XYZAL) 5 MG tablet, Take 1 tablet (5 mg total) by mouth every evening., Disp: 30 tablet, Rfl: 11    meloxicam (MOBIC) 15 MG tablet, TAKE 1 TABLET(15 MG) BY MOUTH EVERY DAY, Disp: 30 tablet, Rfl: 0    venlafaxine (EFFEXOR-XR) 75 MG 24 hr capsule, Take 1 capsule (75 mg total) by mouth once daily., Disp: 30 capsule, Rfl: 1    albuterol (PROVENTIL/VENTOLIN HFA) 90 mcg/actuation inhaler, Inhale 2 puffs into the lungs every 4 (four) hours as needed for Wheezing., Disp: 1 Inhaler, Rfl: 1    fluticasone propionate (FLONASE) 50 mcg/actuation nasal spray, 1 spray (50 mcg total) by Each Nare route once daily., Disp: 1 Bottle, Rfl: 5    gabapentin (NEURONTIN) 300 MG capsule, Take 3 capsules (900 mg total) by mouth every evening. Take 45 min to 1 hour before bed as may cause drowsiness, Disp: 90 capsule, Rfl: 1    Current Facility-Administered Medications:     methylPREDNISolone acetate injection 80 mg, 80 mg, Intramuscular, Once, Bee Bentley, NP      Follow-up after switching from a Effexor 2 Wellbutrin.  Sex drive is now very strong.  Having trouble with erections.  Has been the case for quite a while now.  Never tried Viagra.  Mood is much better.  Anxiety is much better.  Sleeping better, though still stays awake for about an hour before falling asleep.  Wants to stay on the Wellbutrin.  Also has helped greatly with his craving for cigarettes    Review of Systems   Constitutional: Negative for activity change, appetite change and fever.   HENT: Negative for sore throat.    Respiratory: Negative for  cough and shortness of breath.    Cardiovascular: Negative for chest pain.   Gastrointestinal: Negative for abdominal pain, diarrhea and nausea.   Genitourinary: Negative for difficulty urinating.   Musculoskeletal: Negative for arthralgias and myalgias.   Neurological: Negative for dizziness and headaches.   Psychiatric/Behavioral: Positive for sleep disturbance. Negative for dysphoric mood and suicidal ideas. The patient is not nervous/anxious.        Objective:      Vitals:    06/20/19 0929 06/20/19 0944   BP: (!) 141/85 134/88   Pulse: 76    Temp: 97.1 °F (36.2 °C)    SpO2: 96%    Weight: 120.3 kg (265 lb 3.4 oz)    Height: 6' (1.829 m)    PainSc: 0-No pain      Physical Exam   Constitutional: He is oriented to person, place, and time. He appears well-developed and well-nourished. No distress.   HENT:   Head: Normocephalic.   Neck: Neck supple. No thyromegaly present.   Cardiovascular: Normal rate, regular rhythm and normal heart sounds.   No murmur heard.  Pulmonary/Chest: Effort normal and breath sounds normal. He has no wheezes. He has no rales.   Abdominal: Soft. He exhibits no distension.   Musculoskeletal: He exhibits no edema.   Lymphadenopathy:     He has no cervical adenopathy.   Neurological: He is alert and oriented to person, place, and time.   Skin: Skin is warm and dry. He is not diaphoretic.   Psychiatric: He has a normal mood and affect. His behavior is normal. Judgment and thought content normal.   Nursing note and vitals reviewed.      Assessment:       1. Erectile dysfunction, unspecified erectile dysfunction type    2. Insomnia, unspecified type    3. Anxiety    4. Tobacco abuse        Plan:   Erectile dysfunction, unspecified erectile dysfunction type  Comments:  Trial of Viagra    Insomnia, unspecified type  Comments:  Improving on Wellbutrin    Anxiety  Comments:  Improving on Wellbutrin    Tobacco abuse  Comments:  Cutting back after starting Wellbutrin    Other orders  -     fluticasone  propionate (FLONASE) 50 mcg/actuation nasal spray; 1 spray (50 mcg total) by Each Nare route once daily.  Dispense: 1 Bottle; Refill: 5

## 2019-06-27 ENCOUNTER — PATIENT MESSAGE (OUTPATIENT)
Dept: FAMILY MEDICINE | Facility: CLINIC | Age: 34
End: 2019-06-27

## 2019-06-27 RX ORDER — METHYLPREDNISOLONE 4 MG/1
TABLET ORAL
Qty: 1 PACKAGE | Refills: 0 | Status: SHIPPED | OUTPATIENT
Start: 2019-06-27 | End: 2019-08-20

## 2019-06-27 RX ORDER — AZITHROMYCIN 250 MG/1
TABLET, FILM COATED ORAL
Qty: 6 TABLET | Refills: 0 | Status: SHIPPED | OUTPATIENT
Start: 2019-06-27 | End: 2019-07-02

## 2019-07-09 RX ORDER — BUPROPION HYDROCHLORIDE 150 MG/1
150 TABLET ORAL DAILY
Qty: 30 TABLET | Refills: 1 | Status: SHIPPED | OUTPATIENT
Start: 2019-07-09 | End: 2019-09-05 | Stop reason: SDUPTHER

## 2019-08-05 ENCOUNTER — PATIENT MESSAGE (OUTPATIENT)
Dept: PAIN MEDICINE | Facility: CLINIC | Age: 34
End: 2019-08-05

## 2019-08-07 DIAGNOSIS — Z00.00 ROUTINE GENERAL MEDICAL EXAMINATION AT A HEALTH CARE FACILITY: Primary | ICD-10-CM

## 2019-08-20 ENCOUNTER — CLINICAL SUPPORT (OUTPATIENT)
Dept: PULMONOLOGY | Facility: CLINIC | Age: 34
End: 2019-08-20

## 2019-08-20 ENCOUNTER — CLINICAL SUPPORT (OUTPATIENT)
Dept: AUDIOLOGY | Facility: CLINIC | Age: 34
End: 2019-08-20

## 2019-08-20 ENCOUNTER — CLINICAL SUPPORT (OUTPATIENT)
Dept: INTERNAL MEDICINE | Facility: CLINIC | Age: 34
End: 2019-08-20
Payer: COMMERCIAL

## 2019-08-20 ENCOUNTER — OFFICE VISIT (OUTPATIENT)
Dept: INTERNAL MEDICINE | Facility: CLINIC | Age: 34
End: 2019-08-20
Payer: COMMERCIAL

## 2019-08-20 ENCOUNTER — CLINICAL SUPPORT (OUTPATIENT)
Dept: DIABETES | Facility: CLINIC | Age: 34
End: 2019-08-20
Payer: COMMERCIAL

## 2019-08-20 VITALS
DIASTOLIC BLOOD PRESSURE: 81 MMHG | TEMPERATURE: 96 F | DIASTOLIC BLOOD PRESSURE: 81 MMHG | SYSTOLIC BLOOD PRESSURE: 119 MMHG | RESPIRATION RATE: 16 BRPM | HEART RATE: 87 BPM | BODY MASS INDEX: 35.83 KG/M2 | WEIGHT: 264.56 LBS | HEIGHT: 72 IN | HEART RATE: 87 BPM | RESPIRATION RATE: 20 BRPM | SYSTOLIC BLOOD PRESSURE: 119 MMHG | WEIGHT: 264.56 LBS | BODY MASS INDEX: 35.88 KG/M2

## 2019-08-20 VITALS — WEIGHT: 266.75 LBS | BODY MASS INDEX: 36.13 KG/M2 | HEIGHT: 72 IN

## 2019-08-20 DIAGNOSIS — Z23 NEED FOR HEPATITIS A IMMUNIZATION: ICD-10-CM

## 2019-08-20 DIAGNOSIS — Z00.00 ROUTINE GENERAL MEDICAL EXAMINATION AT A HEALTH CARE FACILITY: ICD-10-CM

## 2019-08-20 DIAGNOSIS — F41.9 ANXIETY: ICD-10-CM

## 2019-08-20 DIAGNOSIS — Z01.12 HEARING CONSERVATION AND TREATMENT EXAM: Primary | ICD-10-CM

## 2019-08-20 DIAGNOSIS — Z00.00 ROUTINE GENERAL MEDICAL EXAMINATION AT A HEALTH CARE FACILITY: Primary | ICD-10-CM

## 2019-08-20 DIAGNOSIS — F17.200 SMOKER: ICD-10-CM

## 2019-08-20 LAB
ALBUMIN SERPL BCP-MCNC: 3.8 G/DL (ref 3.5–5.2)
ALP SERPL-CCNC: 87 U/L (ref 55–135)
ALT SERPL W/O P-5'-P-CCNC: 40 U/L (ref 10–44)
ANION GAP SERPL CALC-SCNC: 12 MMOL/L (ref 8–16)
AST SERPL-CCNC: 21 U/L (ref 10–40)
BILIRUB SERPL-MCNC: 1.1 MG/DL (ref 0.1–1)
BILIRUB UR QL STRIP: NEGATIVE
BUN SERPL-MCNC: 12 MG/DL (ref 6–20)
CALCIUM SERPL-MCNC: 9.7 MG/DL (ref 8.7–10.5)
CHLORIDE SERPL-SCNC: 105 MMOL/L (ref 95–110)
CHOLEST SERPL-MCNC: 178 MG/DL (ref 120–199)
CHOLEST/HDLC SERPL: 5.1 {RATIO} (ref 2–5)
CLARITY UR: CLEAR
CO2 SERPL-SCNC: 24 MMOL/L (ref 23–29)
COLOR UR: YELLOW
CREAT SERPL-MCNC: 0.9 MG/DL (ref 0.5–1.4)
ERYTHROCYTE [DISTWIDTH] IN BLOOD BY AUTOMATED COUNT: 13.4 % (ref 11.5–14.5)
EST. GFR  (AFRICAN AMERICAN): >60 ML/MIN/1.73 M^2
EST. GFR  (NON AFRICAN AMERICAN): >60 ML/MIN/1.73 M^2
ESTIMATED AVG GLUCOSE: 103 MG/DL (ref 68–131)
GLUCOSE SERPL-MCNC: 94 MG/DL (ref 70–110)
GLUCOSE UR QL STRIP: NEGATIVE
HBA1C MFR BLD HPLC: 5.2 % (ref 4–5.6)
HCT VFR BLD AUTO: 48.2 % (ref 40–54)
HDLC SERPL-MCNC: 35 MG/DL (ref 40–75)
HDLC SERPL: 19.7 % (ref 20–50)
HGB BLD-MCNC: 16.1 G/DL (ref 14–18)
HGB UR QL STRIP: NEGATIVE
KETONES UR QL STRIP: NEGATIVE
LDLC SERPL CALC-MCNC: 90.4 MG/DL (ref 63–159)
LEUKOCYTE ESTERASE UR QL STRIP: NEGATIVE
MCH RBC QN AUTO: 28.8 PG (ref 27–31)
MCHC RBC AUTO-ENTMCNC: 33.4 G/DL (ref 32–36)
MCV RBC AUTO: 86 FL (ref 82–98)
NITRITE UR QL STRIP: NEGATIVE
NONHDLC SERPL-MCNC: 143 MG/DL
PH UR STRIP: 6 [PH] (ref 5–8)
PLATELET # BLD AUTO: 264 K/UL (ref 150–350)
PMV BLD AUTO: 8.9 FL (ref 9.2–12.9)
POTASSIUM SERPL-SCNC: 4 MMOL/L (ref 3.5–5.1)
PROT SERPL-MCNC: 7.4 G/DL (ref 6–8.4)
PROT UR QL STRIP: NEGATIVE
RBC # BLD AUTO: 5.6 M/UL (ref 4.6–6.2)
SODIUM SERPL-SCNC: 141 MMOL/L (ref 136–145)
SP GR UR STRIP: >=1.03 (ref 1–1.03)
TRIGL SERPL-MCNC: 263 MG/DL (ref 30–150)
URN SPEC COLLECT METH UR: ABNORMAL
WBC # BLD AUTO: 7.26 K/UL (ref 3.9–12.7)

## 2019-08-20 PROCEDURE — 90632 HEPA VACCINE ADULT IM: CPT | Mod: S$GLB,,, | Performed by: INTERNAL MEDICINE

## 2019-08-20 PROCEDURE — 99395 PREV VISIT EST AGE 18-39: CPT | Mod: 25,S$GLB,, | Performed by: INTERNAL MEDICINE

## 2019-08-20 PROCEDURE — 94010 BREATHING CAPACITY TEST: ICD-10-PCS | Mod: S$GLB,,, | Performed by: INTERNAL MEDICINE

## 2019-08-20 PROCEDURE — 85027 COMPLETE CBC AUTOMATED: CPT

## 2019-08-20 PROCEDURE — 92552 PR PURE TONE AUDIOMETRY, AIR: ICD-10-PCS | Mod: S$GLB,,, | Performed by: AUDIOLOGIST-HEARING AID FITTER

## 2019-08-20 PROCEDURE — 99999 PR PBB SHADOW E&M-EST. PATIENT-LVL II: CPT | Mod: PBBFAC,,, | Performed by: DIETITIAN, REGISTERED

## 2019-08-20 PROCEDURE — 83655 ASSAY OF LEAD: CPT

## 2019-08-20 PROCEDURE — 99999 PR PBB SHADOW E&M-EST. PATIENT-LVL III: ICD-10-PCS | Mod: PBBFAC,,, | Performed by: INTERNAL MEDICINE

## 2019-08-20 PROCEDURE — 92552 PURE TONE AUDIOMETRY AIR: CPT | Mod: S$GLB,,, | Performed by: AUDIOLOGIST-HEARING AID FITTER

## 2019-08-20 PROCEDURE — 94010 BREATHING CAPACITY TEST: CPT | Mod: S$GLB,,, | Performed by: INTERNAL MEDICINE

## 2019-08-20 PROCEDURE — 83036 HEMOGLOBIN GLYCOSYLATED A1C: CPT

## 2019-08-20 PROCEDURE — 97802 PR MED NUTR THER, 1ST, INDIV, EA 15 MIN: ICD-10-PCS | Mod: S$GLB,ICN,, | Performed by: DIETITIAN, REGISTERED

## 2019-08-20 PROCEDURE — 90632 HEPATITIS A VACCINE ADULT IM: ICD-10-PCS | Mod: S$GLB,,, | Performed by: INTERNAL MEDICINE

## 2019-08-20 PROCEDURE — 80053 COMPREHEN METABOLIC PANEL: CPT

## 2019-08-20 PROCEDURE — 99999 PR PBB SHADOW E&M-EST. PATIENT-LVL II: ICD-10-PCS | Mod: PBBFAC,,, | Performed by: DIETITIAN, REGISTERED

## 2019-08-20 PROCEDURE — 90471 IMMUNIZATION ADMIN: CPT | Mod: S$GLB,,, | Performed by: INTERNAL MEDICINE

## 2019-08-20 PROCEDURE — 90471 HEPATITIS A VACCINE ADULT IM: ICD-10-PCS | Mod: S$GLB,,, | Performed by: INTERNAL MEDICINE

## 2019-08-20 PROCEDURE — 99395 PR PREVENTIVE VISIT,EST,18-39: ICD-10-PCS | Mod: 25,S$GLB,, | Performed by: INTERNAL MEDICINE

## 2019-08-20 PROCEDURE — 97802 MEDICAL NUTRITION INDIV IN: CPT | Mod: S$GLB,ICN,, | Performed by: DIETITIAN, REGISTERED

## 2019-08-20 PROCEDURE — 99999 PR PBB SHADOW E&M-EST. PATIENT-LVL III: CPT | Mod: PBBFAC,,, | Performed by: INTERNAL MEDICINE

## 2019-08-20 PROCEDURE — 80061 LIPID PANEL: CPT

## 2019-08-20 PROCEDURE — 81003 URINALYSIS AUTO W/O SCOPE: CPT

## 2019-08-20 NOTE — PROGRESS NOTES
Subjective:      Patient ID: Nato Ng is a 34 y.o. male.    Chief Complaint: Executive Health    HPI     Dear Mr. Ng:     It was a pleasure to meet you for your Executive Health  Physical on 8/20/19  You are a 34-year-old  gentleman with a past medical history of childhood asthma, chronic back  Pain, anxiety.        Outpatient and Clinic-Administered Medications         sildenafil (VIAGRA) 50 MG tablet              meloxicam (MOBIC) 15 MG tablet         levocetirizine (XYZAL) 5 MG tablet 5 mg, Nightly        gabapentin (NEURONTIN) 300 MG capsule 900 mg, Nightly        fluticasone propionate (FLONASE) 50 mcg/actuation nasal spray 1 spray, Daily        buPROPion (WELLBUTRIN XL) 150 MG TB24 tablet 150 mg, Daily             Albuterol  2 puff, Every 4 hours PRN           Your past surgical history includes a complicated  fracture repair of your right ankle.  Your allergies  include penicillin, which resulted in a rash.  You are  employed with the [employer].  You  smoke approximately one pack of cigarettes per week.   You do not use alcohol.     Your family history includes a father with nonmelanoma  skin cancer and is otherwise healthy.  Your mother is  Healthy.  Siblings are healthy.     PREVENTIVE MEDICINE:  Your tetanus, diphtheria, and  whooping cough vaccine and your Pneumovax are up to  date with [facility] as of March 8, 2017.  First dose of hep a today. Second dose in 6 months. Remember flu vaccine September or October.     Review of Systems   Constitutional: Negative for chills and fever.   HENT: Negative for ear pain and sore throat.    Respiratory: Negative for cough.    Cardiovascular: Negative for chest pain.   Gastrointestinal: Negative for abdominal pain and blood in stool.   Genitourinary: Negative for dysuria and hematuria.   Neurological: Negative for seizures and syncope.     Objective:   /81   Pulse 87   Temp 96.3 °F (35.7 °C) (Tympanic)   Resp 20   Wt 120 kg (264 lb  8.8 oz)   BMI 35.88 kg/m²     Physical Exam   Constitutional: He is oriented to person, place, and time. He appears well-developed and well-nourished. No distress.   HENT:   Head: Normocephalic and atraumatic.   Mouth/Throat: Oropharynx is clear and moist.   Eyes: Pupils are equal, round, and reactive to light. EOM are normal.   Neck: Neck supple. No thyromegaly present.   Cardiovascular: Normal rate and regular rhythm.   Pulmonary/Chest: Breath sounds normal. He has no wheezes. He has no rales.   Abdominal: Soft. Bowel sounds are normal. There is no tenderness.   Musculoskeletal: He exhibits no edema.   Lymphadenopathy:     He has no cervical adenopathy.   Neurological: He is alert and oriented to person, place, and time.   Skin: Skin is warm and dry.   Psychiatric: He has a normal mood and affect. His behavior is normal.       Assessment:     1. Routine general medical examination at a health care facility    2. Need for hepatitis A immunization    3. Smoker    4. Anxiety      Plan:   Routine general medical examination at a health care facility  Heart healthy diet and reg exercise  HM reviewed    Need for hepatitis A immunization  -     Hepatitis A Vaccine (Adult) (IM); Future; Expected date: 02/20/2020  -     Hepatitis A Vaccine (Adult) (IM)    Smoker  Advised cessation    Anxiety  stable      Lab Frequency Next Occurrence   Comprehensive metabolic panel Once 01/18/2019   CBC auto differential Once 01/18/2019   APTT Once 01/18/2019   Protime-INR Once 01/18/2019       Problem List Items Addressed This Visit     None      Visit Diagnoses     Routine general medical examination at a health care facility    -  Primary    Need for hepatitis A immunization        Relevant Orders    Hepatitis A Vaccine (Adult) (IM)    Hepatitis A Vaccine (Adult) (IM) (Completed)    Smoker        Anxiety              No follow-ups on file.

## 2019-08-20 NOTE — PROGRESS NOTES
Executive Health Screening    Nato Ng was seen 08/20/2019 for an executive health hearing screen.  Results revealed normal hearing sensitivity 250-8000 Hz, bilaterally.  Otoscopy was within normal limits, bilaterally.    Recommendations:  1. Wear Hearing Protective Devices around loud noises

## 2019-08-20 NOTE — PROGRESS NOTES
Nutrition Assessment  Client name:  Nato Ng  :  1985  Age:  34 y.o.  Gender:  male    Client states:  Client works at [employer]. He works 3, 24-hour shifts per week. 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 Client states he has gained 50 pounds over the past year. He had an injury which limited his physical activity. He is disappointed in his weight gain but is ready to make changes to lose weight although he states it is challenging to make changes.     Anthropometrics  Height:  6'    Weight:  266  BMI: 36.18    Clinical Signs/Symptoms  N/V/D:  None.   Appetite (Good, Fair, or Poor):  Good.     Past Medical History:   Diagnosis Date    Bulging lumbar disc        Past Surgical History:   Procedure Laterality Date    LEG SURGERY         Medications    has a current medication list which includes the following prescription(s): albuterol, bupropion, fluticasone propionate, gabapentin, levocetirizine, meloxicam, and sildenafil.    Vitamins, Minerals, and/or Supplements:  Multivitamin.     Food/Medication Interactions:  Reviewed     Food Allergies or Intolerances:  None.     Social History    Marital status:  Single  Employment:   at [employer], Builds patio covers on days off of work.     Social History     Tobacco Use    Smoking status: Current Every Day Smoker     Packs/day: 0.50     Years: 3.00     Pack years: 1.50     Types: Cigarettes     Start date:     Smokeless tobacco: Current User     Types: Snuff   Substance Use Topics    Alcohol use: No     Frequency: Monthly or less     Drinks per session: 5 or 6     Binge frequency: Less than monthly        Lab Reports   Total Cholesterol: 178   Triglycerides:  263  HDL:  35  LDL:  90.4  Glucose:  94  HbA1c:  5.2  BP:  119/81    Food History/24-hour Recall  Breakfast: 2 eggs, ham, water, 2 cups black coffee  Mid-morning Snack: None.   Lunch: Fresh spinach, ham and cheese in a spinach tortilla wrap,  sweet tea  Mid-afternoon Snack:  None.   Dinner: Grilled Chicken [place] from [employer]. Snack:  None.   *Fluid intake: 1-2 cups of black coffee each morning, water, sweet tea or soda with meals.   Eating Out: 1 day per week. Likes chips more than sweets.     Exercise History:  Started two weeks ago, at work, 45 minute work-out routine consisting of dumbbells, light weights, sit-ups, push-ups. About 3 days per week.     Cultural/Spiritual/Personal Preferences:  None reported.     Support System:  self    State of Change:  Contemplation    Barriers to Change:  Motivation    Diagnosis    Overweight/Obesity related to excessive energy intake and lack of consistent physical activity as evidenced by BMI of 36.18 (Obesity Class 2, Moderate Risk), patient reports of high calorie liquids and injury sustained last year which limits his physical activity.    Intervention    RMR (Method:  [place]- [place] Jeor):  2199 kcal  Activity Factor:  1.2 (light activity)  MARK:  2637 kcal  2137 kcal for weight loss    Goals:  1.  Eliminate sodas and sweet tea from diet and drink water only.   2.  Be consistent with physical activity 3 days per week for at least 45 minutes.       Nutrition Education  Reviewed plate method for meal planning and demonstrated the plate method. Discussed timing of meals and importance of consistent meals throughout the day and benefits. Reviewed fast food guide, restaurant guide and Eat Fit shopping list. Introduced cliennt to Eat Fit nakul to locate healthy eating options in his area. Encouraged client to make healthy food choices when eating out. Educated on reducing calorie intake by 500 calories per day for a 1 pound weight loss per week. Suggested client cut out sodas and sweet tea for weight loss.   Patient verbalized understanding of nutrition education and recommendations received.    Handouts Provided  Plate Method for Meal Planning  Restaurant Guide  Eat Fit Shopping List  Eat Fit Angela  Fast  Food Guide  Vitamin/Mineral Guide    Monitoring/Evaluation    Monitor the following:  Weight  BMI  Caloric intake  Labs:  A1c, TG    Follow Up Plan:  Communication with referring healthcare provider is unnecessary at this time as patient presented as part of annual wellness exam.  However, will follow up with patient in 1-2 years.

## 2019-08-21 LAB
BRPFT: NORMAL
FEF 25 75 LLN: 2.87
FEF 25 75 PRE REF: 79.4 %
FEF 25 75 REF: 4.68
FEV1 FVC LLN: 71
FEV1 FVC PRE REF: 96.7 %
FEV1 FVC REF: 82
FEV1 LLN: 3.76
FEV1 PRE REF: 83 %
FEV1 REF: 4.72
FVC LLN: 4.66
FVC PRE REF: 85.4 %
FVC REF: 5.79
PEF LLN: 8.27
PEF PRE REF: 78.9 %
PEF REF: 10.73
PRE FEF 25 75: 3.71 L/S (ref 2.87–6.49)
PRE FET 100: 9.35 SEC
PRE FEV1 FVC: 79.24 % (ref 71.22–92.63)
PRE FEV1: 3.92 L (ref 3.76–5.68)
PRE FVC: 4.95 L (ref 4.66–6.93)
PRE PEF: 8.46 L/S (ref 8.27–13.18)

## 2019-08-22 LAB
CITY: NORMAL
COUNTY: NORMAL
GUARDIAN FIRST NAME: NORMAL
GUARDIAN LAST NAME: NORMAL
LEAD BLDV-MCNC: <1 MCG/DL (ref 0–4.9)
PHONE #: NORMAL
POSTAL CODE: NORMAL
RACE: NORMAL
SPECIMEN SOURCE: NORMAL
STATE OF RESIDENCE: NORMAL
STREET ADDRESS: NORMAL

## 2019-09-05 RX ORDER — BUPROPION HYDROCHLORIDE 150 MG/1
TABLET ORAL
Qty: 30 TABLET | Refills: 2 | Status: SHIPPED | OUTPATIENT
Start: 2019-09-05 | End: 2019-12-15 | Stop reason: SDUPTHER

## 2019-10-02 ENCOUNTER — HOSPITAL ENCOUNTER (OUTPATIENT)
Dept: RADIOLOGY | Facility: HOSPITAL | Age: 34
Discharge: HOME | End: 2019-10-02
Attending: FAMILY MEDICINE
Payer: COMMERCIAL

## 2019-10-02 ENCOUNTER — OFFICE VISIT (OUTPATIENT)
Dept: FAMILY MEDICINE | Facility: CLINIC | Age: 34
End: 2019-10-02
Payer: COMMERCIAL

## 2019-10-02 VITALS
SYSTOLIC BLOOD PRESSURE: 134 MMHG | DIASTOLIC BLOOD PRESSURE: 88 MMHG | HEART RATE: 107 BPM | TEMPERATURE: 98 F | OXYGEN SATURATION: 97 % | BODY MASS INDEX: 36.13 KG/M2 | HEIGHT: 72 IN | WEIGHT: 266.75 LBS

## 2019-10-02 DIAGNOSIS — M54.41 CHRONIC BILATERAL LOW BACK PAIN WITH BILATERAL SCIATICA: ICD-10-CM

## 2019-10-02 DIAGNOSIS — R03.0 BLOOD PRESSURE ELEVATED WITHOUT HISTORY OF HTN: ICD-10-CM

## 2019-10-02 DIAGNOSIS — M54.42 CHRONIC BILATERAL LOW BACK PAIN WITH BILATERAL SCIATICA: ICD-10-CM

## 2019-10-02 DIAGNOSIS — G89.29 CHRONIC BILATERAL LOW BACK PAIN WITH BILATERAL SCIATICA: ICD-10-CM

## 2019-10-02 DIAGNOSIS — Z01.818 PREOP EXAMINATION: Primary | ICD-10-CM

## 2019-10-02 DIAGNOSIS — F41.9 ANXIETY: ICD-10-CM

## 2019-10-02 DIAGNOSIS — Z72.0 TOBACCO ABUSE: ICD-10-CM

## 2019-10-02 DIAGNOSIS — Z01.818 PREOP EXAMINATION: ICD-10-CM

## 2019-10-02 DIAGNOSIS — E88.810 METABOLIC SYNDROME: ICD-10-CM

## 2019-10-02 DIAGNOSIS — E66.9 OBESITY (BMI 30.0-34.9): ICD-10-CM

## 2019-10-02 LAB
BILIRUB UR QL STRIP: NEGATIVE
CLARITY UR REFRACT.AUTO: CLEAR
COLOR UR AUTO: YELLOW
GLUCOSE UR QL STRIP: NEGATIVE
HGB UR QL STRIP: NEGATIVE
KETONES UR QL STRIP: NEGATIVE
LEUKOCYTE ESTERASE UR QL STRIP: NEGATIVE
NITRITE UR QL STRIP: NEGATIVE
PH UR STRIP: 6 [PH] (ref 5–8)
PROT UR QL STRIP: NEGATIVE
SP GR UR STRIP: 1.02 (ref 1–1.03)
URN SPEC COLLECT METH UR: NORMAL

## 2019-10-02 PROCEDURE — 93010 ELECTROCARDIOGRAM REPORT: CPT | Mod: S$GLB,,, | Performed by: INTERNAL MEDICINE

## 2019-10-02 PROCEDURE — 3008F PR BODY MASS INDEX (BMI) DOCUMENTED: ICD-10-PCS | Mod: CPTII,S$GLB,, | Performed by: FAMILY MEDICINE

## 2019-10-02 PROCEDURE — 71046 X-RAY EXAM CHEST 2 VIEWS: CPT | Mod: 26,,, | Performed by: RADIOLOGY

## 2019-10-02 PROCEDURE — 3008F BODY MASS INDEX DOCD: CPT | Mod: CPTII,S$GLB,, | Performed by: FAMILY MEDICINE

## 2019-10-02 PROCEDURE — 93005 ELECTROCARDIOGRAM TRACING: CPT | Mod: S$GLB,,, | Performed by: FAMILY MEDICINE

## 2019-10-02 PROCEDURE — 93005 EKG 12-LEAD: ICD-10-PCS | Mod: S$GLB,,, | Performed by: FAMILY MEDICINE

## 2019-10-02 PROCEDURE — 87086 URINE CULTURE/COLONY COUNT: CPT

## 2019-10-02 PROCEDURE — 71046 XR CHEST PA AND LATERAL: ICD-10-PCS | Mod: 26,,, | Performed by: RADIOLOGY

## 2019-10-02 PROCEDURE — 99214 PR OFFICE/OUTPT VISIT, EST, LEVL IV, 30-39 MIN: ICD-10-PCS | Mod: S$GLB,,, | Performed by: FAMILY MEDICINE

## 2019-10-02 PROCEDURE — 81003 URINALYSIS AUTO W/O SCOPE: CPT

## 2019-10-02 PROCEDURE — 99999 PR PBB SHADOW E&M-EST. PATIENT-LVL III: ICD-10-PCS | Mod: PBBFAC,,, | Performed by: FAMILY MEDICINE

## 2019-10-02 PROCEDURE — 93010 EKG 12-LEAD: ICD-10-PCS | Mod: S$GLB,,, | Performed by: INTERNAL MEDICINE

## 2019-10-02 PROCEDURE — 99999 PR PBB SHADOW E&M-EST. PATIENT-LVL III: CPT | Mod: PBBFAC,,, | Performed by: FAMILY MEDICINE

## 2019-10-02 PROCEDURE — 99214 OFFICE O/P EST MOD 30 MIN: CPT | Mod: S$GLB,,, | Performed by: FAMILY MEDICINE

## 2019-10-02 PROCEDURE — 71046 X-RAY EXAM CHEST 2 VIEWS: CPT | Mod: TC,PO

## 2019-10-02 RX ORDER — MELOXICAM 15 MG/1
15 TABLET ORAL DAILY
Qty: 90 TABLET | Refills: 0 | Status: SHIPPED | OUTPATIENT
Start: 2019-10-02 | End: 2020-09-30

## 2019-10-02 NOTE — PROGRESS NOTES
The Subjective:       Patient ID: Nato Ng is a 34 y.o. male.    Chief Complaint: Pre-op Exam      HPI Comments:       Current Outpatient Medications:     buPROPion (WELLBUTRIN XL) 150 MG TB24 tablet, TAKE 1 TABLET(150 MG) BY MOUTH EVERY DAY, Disp: 30 tablet, Rfl: 2    fluticasone propionate (FLONASE) 50 mcg/actuation nasal spray, 1 spray (50 mcg total) by Each Nare route once daily., Disp: 1 Bottle, Rfl: 5    levocetirizine (XYZAL) 5 MG tablet, Take 1 tablet (5 mg total) by mouth every evening., Disp: 30 tablet, Rfl: 11    meloxicam (MOBIC) 15 MG tablet, Take 1 tablet (15 mg total) by mouth once daily., Disp: 90 tablet, Rfl: 0    sildenafil (VIAGRA) 50 MG tablet, Take 1 tablet by mouth 30-60 minutes prior to sexual activity.  Limit 1 per 24 hr, Disp: 6 tablet, Rfl: 1    albuterol (PROVENTIL/VENTOLIN HFA) 90 mcg/actuation inhaler, Inhale 2 puffs into the lungs every 4 (four) hours as needed for Wheezing., Disp: 1 Inhaler, Rfl: 1    gabapentin (NEURONTIN) 300 MG capsule, Take 3 capsules (900 mg total) by mouth every evening. Take 45 min to 1 hour before bed as may cause drowsiness, Disp: 90 capsule, Rfl: 1      Here for preop clearance.  Form completed and scanned, and faxed back to surgeon.  Date of surgery:  11/08/2019.  L5-S1 fusion.  Dr. Simon at [facility].    No changes to his past medical history, past surgical history, family history, medications.    Current medications:  Wellbutrin, Xyzal, albuterol, gabapentin, Mobic p.r.n.    Review of Systems   Constitutional: Negative for activity change, appetite change and fever.   HENT: Negative for sore throat.    Respiratory: Negative for cough and shortness of breath.    Cardiovascular: Negative for chest pain.   Gastrointestinal: Negative for abdominal pain, diarrhea and nausea.   Genitourinary: Negative for difficulty urinating.   Musculoskeletal: Positive for back pain. Negative for arthralgias and myalgias.   Neurological: Negative for  dizziness and headaches.       Objective:      Vitals:    10/02/19 1254 10/02/19 1325   BP: (!) 140/67 134/88   Pulse: 107    Temp: 97.9 °F (36.6 °C)    SpO2: 97%    Weight: 121 kg (266 lb 12.1 oz)    Height: 6' (1.829 m)    PainSc:   6      Physical Exam   Constitutional: He is oriented to person, place, and time. He appears well-developed and well-nourished. No distress.   HENT:   Head: Normocephalic.   Mouth/Throat: No oropharyngeal exudate.   Neck: Neck supple. No thyromegaly present.   Cardiovascular: Normal rate, regular rhythm and normal heart sounds.   No murmur heard.  Pulmonary/Chest: Effort normal and breath sounds normal. He has no wheezes. He has no rales.   Abdominal: Soft. He exhibits no distension and no mass. There is no hepatosplenomegaly. There is no tenderness.   Musculoskeletal: He exhibits no edema.   Lymphadenopathy:     He has no cervical adenopathy.   Neurological: He is alert and oriented to person, place, and time.   Skin: Skin is warm and dry. He is not diaphoretic.   Psychiatric: He has a normal mood and affect. His behavior is normal. Judgment and thought content normal.   Nursing note and vitals reviewed.      Assessment:       1. Preop examination    2. Chronic bilateral low back pain with bilateral sciatica    3. Obesity (BMI 30.0-34.9)    4. Tobacco abuse    5. Anxiety    6. Metabolic syndrome    7. Blood pressure elevated without history of HTN        Plan:   Preop examination  Comments:  History and physical, blood work, chest x-ray,  EKG all WNL.  Low risk  for surgery/general anesthesia  Orders:  -     EKG 12-lead  -     Urinalysis; Future; Expected date: 10/02/2019  -     Urine culture; Future; Expected date: 10/02/2019  -     CBC auto differential; Future; Expected date: 10/02/2019  -     Comprehensive metabolic panel; Future; Expected date: 10/02/2019  -     Protime-INR; Future; Expected date: 10/02/2019  -     X-Ray Chest PA And Lateral; Future; Expected date:  10/02/2019    Chronic bilateral low back pain with bilateral sciatica  Comments:  Refill Mobic.  Orders:  -     meloxicam (MOBIC) 15 MG tablet; Take 1 tablet (15 mg total) by mouth once daily.  Dispense: 90 tablet; Refill: 0    Obesity (BMI 30.0-34.9)  Comments:  Cautioned about weight gain while he is recovering postop 6-12 months    Tobacco abuse  Comments:  Quit 2 weeks ago    Anxiety  Comments:  Doing well on Wellbutrin    Metabolic syndrome  Comments:  Elevated triglycerides.  Low HDL    Blood pressure elevated without history of HTN  Comments:  Will continue to monitor

## 2019-10-04 LAB — BACTERIA UR CULT: NO GROWTH

## 2019-10-17 ENCOUNTER — OFFICE VISIT (OUTPATIENT)
Dept: FAMILY MEDICINE | Facility: CLINIC | Age: 34
End: 2019-10-17
Payer: COMMERCIAL

## 2019-10-17 VITALS
OXYGEN SATURATION: 95 % | BODY MASS INDEX: 36.52 KG/M2 | DIASTOLIC BLOOD PRESSURE: 98 MMHG | SYSTOLIC BLOOD PRESSURE: 138 MMHG | TEMPERATURE: 99 F | WEIGHT: 269.63 LBS | HEIGHT: 72 IN | HEART RATE: 89 BPM

## 2019-10-17 DIAGNOSIS — R03.0 ELEVATED BLOOD PRESSURE READING WITHOUT DIAGNOSIS OF HYPERTENSION: ICD-10-CM

## 2019-10-17 DIAGNOSIS — F51.04 CHRONIC INSOMNIA: Primary | ICD-10-CM

## 2019-10-17 PROCEDURE — 99213 PR OFFICE/OUTPT VISIT, EST, LEVL III, 20-29 MIN: ICD-10-PCS | Mod: S$GLB,,, | Performed by: FAMILY MEDICINE

## 2019-10-17 PROCEDURE — 3008F PR BODY MASS INDEX (BMI) DOCUMENTED: ICD-10-PCS | Mod: CPTII,S$GLB,, | Performed by: FAMILY MEDICINE

## 2019-10-17 PROCEDURE — 99999 PR PBB SHADOW E&M-EST. PATIENT-LVL III: ICD-10-PCS | Mod: PBBFAC,,, | Performed by: FAMILY MEDICINE

## 2019-10-17 PROCEDURE — 99999 PR PBB SHADOW E&M-EST. PATIENT-LVL III: CPT | Mod: PBBFAC,,, | Performed by: FAMILY MEDICINE

## 2019-10-17 PROCEDURE — 3008F BODY MASS INDEX DOCD: CPT | Mod: CPTII,S$GLB,, | Performed by: FAMILY MEDICINE

## 2019-10-17 PROCEDURE — 99213 OFFICE O/P EST LOW 20 MIN: CPT | Mod: S$GLB,,, | Performed by: FAMILY MEDICINE

## 2019-10-17 RX ORDER — ZOLPIDEM TARTRATE 5 MG/1
5 TABLET ORAL NIGHTLY PRN
Qty: 26 TABLET | Refills: 3 | Status: SHIPPED | OUTPATIENT
Start: 2019-10-17 | End: 2020-03-16

## 2019-10-17 NOTE — PROGRESS NOTES
Chief Complaint:    Chief Complaint   Patient presents with    Insomnia       History of Present Illness:    Complaining of trouble falling asleep this is been going on for the past 10 years.  Denies any depression anxiety.  He is will be having back surgery it took Ambien in the past that worked well wants to try it again.  Blood pressure is elevated today      ROS:  Review of Systems   Constitutional: Negative for activity change, chills, fatigue, fever and unexpected weight change.   HENT: Negative for congestion, ear discharge, ear pain, hearing loss, postnasal drip and rhinorrhea.    Eyes: Negative for pain and visual disturbance.   Respiratory: Negative for cough, chest tightness and shortness of breath.    Cardiovascular: Negative for chest pain and palpitations.   Gastrointestinal: Negative for abdominal pain, diarrhea and vomiting.   Endocrine: Negative for heat intolerance.   Genitourinary: Negative for dysuria, flank pain, frequency and hematuria.   Musculoskeletal: Negative for back pain, gait problem and neck pain.   Skin: Negative for color change and rash.   Neurological: Negative for dizziness, tremors, seizures, numbness and headaches.   Psychiatric/Behavioral: Negative for agitation, hallucinations, self-injury, sleep disturbance and suicidal ideas. The patient is not nervous/anxious.        Past Medical History:   Diagnosis Date    Bulging lumbar disc        Social History:  Social History     Socioeconomic History    Marital status: Single     Spouse name: Not on file    Number of children: Not on file    Years of education: Not on file    Highest education level: Not on file   Occupational History     Employer: [employer]   Social Needs    Financial resource strain: Not hard at all    Food insecurity:     Worry: Never true     Inability: Never true    Transportation needs:     Medical: No     Non-medical: No   Tobacco Use    Smoking status: Current Every Day Smoker      Packs/day: 0.50     Years: 3.00     Pack years: 1.50     Types: Cigarettes     Start date: 2009    Smokeless tobacco: Current User     Types: Snuff   Substance and Sexual Activity    Alcohol use: No     Frequency: Monthly or less     Drinks per session: 5 or 6     Binge frequency: Less than monthly    Drug use: No    Sexual activity: Yes     Partners: Female     Birth control/protection: None   Lifestyle    Physical activity:     Days per week: 2 days     Minutes per session: 30 min    Stress: Very much   Relationships    Social connections:     Talks on phone: Three times a week     Gets together: Three times a week     Attends Catholic service: Not on file     Active member of club or organization: Yes     Attends meetings of clubs or organizations: More than 4 times per year     Relationship status: Patient refused   Other Topics Concern    Not on file   Social History Narrative    Not on file       Family History:   family history includes Arthritis in his maternal grandfather; Diabetes in his maternal grandfather.    Health Maintenance   Topic Date Due    TETANUS VACCINE  03/08/2027    Pneumococcal Vaccine (Medium Risk)  Completed    Lipid Panel  Completed       Physical Exam:    Vital Signs  Temp: 99 °F (37.2 °C)  Temp src: Temporal  Pulse: 89  SpO2: 95 %  BP: (!) 138/98  BP Location: Left arm  Patient Position: Sitting  Pain Score: 0-No pain  Height and Weight  Height: 6' (182.9 cm)  Weight: 122.3 kg (269 lb 10 oz)  BSA (Calculated - sq m): 2.49 sq meters  BMI (Calculated): 36.6  Weight in (lb) to have BMI = 25: 183.9]    Body mass index is 36.57 kg/m².    Physical Exam   Constitutional: He appears well-developed.   Pulmonary/Chest: Effort normal and breath sounds normal.         Assessment:      ICD-10-CM ICD-9-CM   1. Chronic insomnia F51.04 780.52   2. Elevated blood pressure reading without diagnosis of hypertension R03.0 796.2         Plan:        Ambien given 5 mg as needed for sleep.  Side  effects and been discussed with the patient  Do not use on a regular basis  Discussed cognitive behavioral therapy as a long-term plan for chronic insomnia    Please start monitoring her blood pressure every day with blood pressure monitor OMERON 10.  Blood pressure must be checked at least 2 to 3 times a day in a seated position.  Must rest at least 10 min before checking the blood pressure and make sure the bladder is empty and urine not in any distress. Please write the numbers down and bring us the machine and the readings     Please schedule an appointment with her PCP with the blood pressure readings        No orders of the defined types were placed in this encounter.      Current Outpatient Medications   Medication Sig Dispense Refill    buPROPion (WELLBUTRIN XL) 150 MG TB24 tablet TAKE 1 TABLET(150 MG) BY MOUTH EVERY DAY 30 tablet 2    fluticasone propionate (FLONASE) 50 mcg/actuation nasal spray 1 spray (50 mcg total) by Each Nare route once daily. 1 Bottle 5    levocetirizine (XYZAL) 5 MG tablet Take 1 tablet (5 mg total) by mouth every evening. 30 tablet 11    meloxicam (MOBIC) 15 MG tablet Take 1 tablet (15 mg total) by mouth once daily. 90 tablet 0    sildenafil (VIAGRA) 50 MG tablet Take 1 tablet by mouth 30-60 minutes prior to sexual activity.  Limit 1 per 24 hr 6 tablet 1    albuterol (PROVENTIL/VENTOLIN HFA) 90 mcg/actuation inhaler Inhale 2 puffs into the lungs every 4 (four) hours as needed for Wheezing. 1 Inhaler 1    gabapentin (NEURONTIN) 300 MG capsule Take 3 capsules (900 mg total) by mouth every evening. Take 45 min to 1 hour before bed as may cause drowsiness 90 capsule 1    zolpidem (AMBIEN) 5 MG Tab Take 1 tablet (5 mg total) by mouth nightly as needed. 26 tablet 3     No current facility-administered medications for this visit.        There are no discontinued medications.    No follow-ups on file.      Ivonne Pineda MD

## 2019-10-22 ENCOUNTER — PATIENT MESSAGE (OUTPATIENT)
Dept: FAMILY MEDICINE | Facility: CLINIC | Age: 34
End: 2019-10-22

## 2019-10-22 DIAGNOSIS — J00 ACUTE NASOPHARYNGITIS: ICD-10-CM

## 2019-10-22 NOTE — TELEPHONE ENCOUNTER
----- Message from Magalis Goncalves sent at 10/22/2019  2:27 PM CDT -----  Contact: [facility]  Ms Vo needs a copy of patients UA, CBC, CMP, PT INR PTT and MRSA and chest xray faxed to her at [phone]. Thank you

## 2019-10-23 DIAGNOSIS — Z01.818 PREOP EXAMINATION: Primary | ICD-10-CM

## 2019-10-23 RX ORDER — LEVOCETIRIZINE DIHYDROCHLORIDE 5 MG/1
TABLET, FILM COATED ORAL
Qty: 30 TABLET | Refills: 0 | OUTPATIENT
Start: 2019-10-23

## 2019-10-24 ENCOUNTER — LAB VISIT (OUTPATIENT)
Dept: LAB | Facility: HOSPITAL | Age: 34
End: 2019-10-24
Attending: FAMILY MEDICINE
Payer: COMMERCIAL

## 2019-10-24 ENCOUNTER — CLINICAL SUPPORT (OUTPATIENT)
Dept: FAMILY MEDICINE | Facility: CLINIC | Age: 34
End: 2019-10-24
Payer: COMMERCIAL

## 2019-10-24 DIAGNOSIS — Z01.818 PREOP EXAMINATION: ICD-10-CM

## 2019-10-24 LAB — APTT BLDCRRT: 33.6 SEC (ref 21–32)

## 2019-10-24 PROCEDURE — 87081 CULTURE SCREEN ONLY: CPT

## 2019-10-24 PROCEDURE — 36415 COLL VENOUS BLD VENIPUNCTURE: CPT | Mod: PO

## 2019-10-24 PROCEDURE — 85730 THROMBOPLASTIN TIME PARTIAL: CPT

## 2019-10-26 ENCOUNTER — PATIENT MESSAGE (OUTPATIENT)
Dept: FAMILY MEDICINE | Facility: CLINIC | Age: 34
End: 2019-10-26

## 2019-10-26 LAB — MRSA SPEC QL CULT: NORMAL

## 2019-12-04 ENCOUNTER — PATIENT MESSAGE (OUTPATIENT)
Dept: FAMILY MEDICINE | Facility: CLINIC | Age: 34
End: 2019-12-04

## 2019-12-04 DIAGNOSIS — J00 ACUTE NASOPHARYNGITIS: ICD-10-CM

## 2019-12-05 RX ORDER — LEVOCETIRIZINE DIHYDROCHLORIDE 5 MG/1
5 TABLET, FILM COATED ORAL NIGHTLY
Qty: 30 TABLET | Refills: 11 | Status: SHIPPED | OUTPATIENT
Start: 2019-12-05 | End: 2020-12-14

## 2019-12-16 RX ORDER — BUPROPION HYDROCHLORIDE 150 MG/1
TABLET ORAL
Qty: 90 TABLET | Refills: 1 | Status: SHIPPED | OUTPATIENT
Start: 2019-12-16 | End: 2020-03-15

## 2020-03-15 RX ORDER — BUPROPION HYDROCHLORIDE 150 MG/1
TABLET ORAL
Qty: 90 TABLET | Refills: 1 | Status: SHIPPED | OUTPATIENT
Start: 2020-03-15 | End: 2020-09-30

## 2020-03-16 RX ORDER — ZOLPIDEM TARTRATE 5 MG/1
TABLET ORAL
Qty: 26 TABLET | Refills: 0 | Status: SHIPPED | OUTPATIENT
Start: 2020-03-16 | End: 2020-03-18

## 2020-03-18 RX ORDER — ZOLPIDEM TARTRATE 5 MG/1
TABLET ORAL
Qty: 26 TABLET | Refills: 1 | Status: SHIPPED | OUTPATIENT
Start: 2020-03-18 | End: 2020-09-30

## 2020-05-11 DIAGNOSIS — R06.2 WHEEZING: ICD-10-CM

## 2020-05-11 DIAGNOSIS — J01.90 ACUTE BACTERIAL SINUSITIS: ICD-10-CM

## 2020-05-11 DIAGNOSIS — B96.89 ACUTE BACTERIAL SINUSITIS: ICD-10-CM

## 2020-05-11 DIAGNOSIS — R05.9 COUGH: ICD-10-CM

## 2020-05-12 RX ORDER — ALBUTEROL SULFATE 90 UG/1
AEROSOL, METERED RESPIRATORY (INHALATION)
Qty: 18 G | OUTPATIENT
Start: 2020-05-12

## 2020-09-30 ENCOUNTER — CLINICAL SUPPORT (OUTPATIENT)
Dept: INTERNAL MEDICINE | Facility: CLINIC | Age: 35
End: 2020-09-30
Payer: COMMERCIAL

## 2020-09-30 ENCOUNTER — OFFICE VISIT (OUTPATIENT)
Dept: INTERNAL MEDICINE | Facility: CLINIC | Age: 35
End: 2020-09-30
Payer: COMMERCIAL

## 2020-09-30 ENCOUNTER — CLINICAL SUPPORT (OUTPATIENT)
Dept: AUDIOLOGY | Facility: CLINIC | Age: 35
End: 2020-09-30

## 2020-09-30 ENCOUNTER — TELEPHONE (OUTPATIENT)
Dept: INTERNAL MEDICINE | Facility: CLINIC | Age: 35
End: 2020-09-30

## 2020-09-30 VITALS
BODY MASS INDEX: 35.83 KG/M2 | HEART RATE: 92 BPM | SYSTOLIC BLOOD PRESSURE: 110 MMHG | HEIGHT: 72 IN | TEMPERATURE: 97 F | DIASTOLIC BLOOD PRESSURE: 88 MMHG | WEIGHT: 264.56 LBS

## 2020-09-30 DIAGNOSIS — Z00.00 ROUTINE GENERAL MEDICAL EXAMINATION AT A HEALTH CARE FACILITY: Primary | ICD-10-CM

## 2020-09-30 DIAGNOSIS — Z23 NEED FOR INFLUENZA VACCINATION: ICD-10-CM

## 2020-09-30 DIAGNOSIS — Z71.3 DIETARY COUNSELING: ICD-10-CM

## 2020-09-30 DIAGNOSIS — Z01.12 HEARING CONSERVATION AND TREATMENT EXAM: Primary | ICD-10-CM

## 2020-09-30 DIAGNOSIS — Z23 NEED FOR HEPATITIS A IMMUNIZATION: ICD-10-CM

## 2020-09-30 LAB
ALBUMIN SERPL BCP-MCNC: 4.1 G/DL (ref 3.5–5.2)
ALP SERPL-CCNC: 88 U/L (ref 55–135)
ALT SERPL W/O P-5'-P-CCNC: 42 U/L (ref 10–44)
ANION GAP SERPL CALC-SCNC: 10 MMOL/L (ref 8–16)
AST SERPL-CCNC: 25 U/L (ref 10–40)
BILIRUB SERPL-MCNC: 1.3 MG/DL (ref 0.1–1)
BILIRUB UR QL STRIP: ABNORMAL
BUN SERPL-MCNC: 14 MG/DL (ref 6–20)
CALCIUM SERPL-MCNC: 9.4 MG/DL (ref 8.7–10.5)
CHLORIDE SERPL-SCNC: 104 MMOL/L (ref 95–110)
CHOLEST SERPL-MCNC: 182 MG/DL (ref 120–199)
CHOLEST/HDLC SERPL: 5.7 {RATIO} (ref 2–5)
CLARITY UR: CLEAR
CO2 SERPL-SCNC: 23 MMOL/L (ref 23–29)
COLOR UR: YELLOW
CREAT SERPL-MCNC: 0.9 MG/DL (ref 0.5–1.4)
ERYTHROCYTE [DISTWIDTH] IN BLOOD BY AUTOMATED COUNT: 13.1 % (ref 11.5–14.5)
EST. GFR  (AFRICAN AMERICAN): >60 ML/MIN/1.73 M^2
EST. GFR  (NON AFRICAN AMERICAN): >60 ML/MIN/1.73 M^2
ESTIMATED AVG GLUCOSE: 103 MG/DL (ref 68–131)
GLUCOSE SERPL-MCNC: 101 MG/DL (ref 70–110)
GLUCOSE UR QL STRIP: NEGATIVE
HBA1C MFR BLD HPLC: 5.2 % (ref 4–5.6)
HCT VFR BLD AUTO: 45.7 % (ref 40–54)
HDLC SERPL-MCNC: 32 MG/DL (ref 40–75)
HDLC SERPL: 17.6 % (ref 20–50)
HGB BLD-MCNC: 15.7 G/DL (ref 14–18)
HGB UR QL STRIP: NEGATIVE
KETONES UR QL STRIP: NEGATIVE
LDLC SERPL CALC-MCNC: 107.2 MG/DL (ref 63–159)
LEUKOCYTE ESTERASE UR QL STRIP: NEGATIVE
MCH RBC QN AUTO: 28.7 PG (ref 27–31)
MCHC RBC AUTO-ENTMCNC: 34.4 G/DL (ref 32–36)
MCV RBC AUTO: 84 FL (ref 82–98)
NITRITE UR QL STRIP: NEGATIVE
NONHDLC SERPL-MCNC: 150 MG/DL
PH UR STRIP: 6 [PH] (ref 5–8)
PLATELET # BLD AUTO: 301 K/UL (ref 150–350)
PMV BLD AUTO: 8.7 FL (ref 9.2–12.9)
POTASSIUM SERPL-SCNC: 4 MMOL/L (ref 3.5–5.1)
PROT SERPL-MCNC: 7.7 G/DL (ref 6–8.4)
PROT UR QL STRIP: NEGATIVE
RBC # BLD AUTO: 5.47 M/UL (ref 4.6–6.2)
SODIUM SERPL-SCNC: 137 MMOL/L (ref 136–145)
SP GR UR STRIP: 1.02 (ref 1–1.03)
TRIGL SERPL-MCNC: 214 MG/DL (ref 30–150)
URN SPEC COLLECT METH UR: ABNORMAL
WBC # BLD AUTO: 8.24 K/UL (ref 3.9–12.7)

## 2020-09-30 PROCEDURE — 83655 ASSAY OF LEAD: CPT

## 2020-09-30 PROCEDURE — 85027 COMPLETE CBC AUTOMATED: CPT

## 2020-09-30 PROCEDURE — 90686 FLU VACCINE (QUAD) GREATER THAN OR EQUAL TO 3YO PRESERVATIVE FREE IM: ICD-10-PCS | Mod: S$GLB,,, | Performed by: INTERNAL MEDICINE

## 2020-09-30 PROCEDURE — 97802 MEDICAL NUTRITION INDIV IN: CPT | Mod: S$GLB,,, | Performed by: INTERNAL MEDICINE

## 2020-09-30 PROCEDURE — 90472 HEPATITIS A VACCINE ADULT IM: ICD-10-PCS | Mod: S$GLB,,, | Performed by: INTERNAL MEDICINE

## 2020-09-30 PROCEDURE — 90472 IMMUNIZATION ADMIN EACH ADD: CPT | Mod: S$GLB,,, | Performed by: INTERNAL MEDICINE

## 2020-09-30 PROCEDURE — 90471 IMMUNIZATION ADMIN: CPT | Mod: S$GLB,,, | Performed by: INTERNAL MEDICINE

## 2020-09-30 PROCEDURE — 81003 URINALYSIS AUTO W/O SCOPE: CPT

## 2020-09-30 PROCEDURE — 90632 HEPATITIS A VACCINE ADULT IM: ICD-10-PCS | Mod: S$GLB,,, | Performed by: INTERNAL MEDICINE

## 2020-09-30 PROCEDURE — 90471 FLU VACCINE (QUAD) GREATER THAN OR EQUAL TO 3YO PRESERVATIVE FREE IM: ICD-10-PCS | Mod: S$GLB,,, | Performed by: INTERNAL MEDICINE

## 2020-09-30 PROCEDURE — 90686 IIV4 VACC NO PRSV 0.5 ML IM: CPT | Mod: S$GLB,,, | Performed by: INTERNAL MEDICINE

## 2020-09-30 PROCEDURE — 80053 COMPREHEN METABOLIC PANEL: CPT

## 2020-09-30 PROCEDURE — 99999 PR PBB SHADOW E&M-EST. PATIENT-LVL III: ICD-10-PCS | Mod: PBBFAC,,, | Performed by: INTERNAL MEDICINE

## 2020-09-30 PROCEDURE — 97802 PR MED NUTR THER, 1ST, INDIV, EA 15 MIN: ICD-10-PCS | Mod: S$GLB,,, | Performed by: INTERNAL MEDICINE

## 2020-09-30 PROCEDURE — 92552 PURE TONE AUDIOMETRY AIR: CPT | Mod: S$GLB,,, | Performed by: AUDIOLOGIST-HEARING AID FITTER

## 2020-09-30 PROCEDURE — 90632 HEPA VACCINE ADULT IM: CPT | Mod: S$GLB,,, | Performed by: INTERNAL MEDICINE

## 2020-09-30 PROCEDURE — 80061 LIPID PANEL: CPT

## 2020-09-30 PROCEDURE — 92552 PR PURE TONE AUDIOMETRY, AIR: ICD-10-PCS | Mod: S$GLB,,, | Performed by: AUDIOLOGIST-HEARING AID FITTER

## 2020-09-30 PROCEDURE — 86803 HEPATITIS C AB TEST: CPT

## 2020-09-30 PROCEDURE — 99999 PR PBB SHADOW E&M-EST. PATIENT-LVL III: CPT | Mod: PBBFAC,,, | Performed by: INTERNAL MEDICINE

## 2020-09-30 PROCEDURE — 99395 PR PREVENTIVE VISIT,EST,18-39: ICD-10-PCS | Mod: 25,S$GLB,, | Performed by: INTERNAL MEDICINE

## 2020-09-30 PROCEDURE — 83036 HEMOGLOBIN GLYCOSYLATED A1C: CPT

## 2020-09-30 PROCEDURE — 99395 PREV VISIT EST AGE 18-39: CPT | Mod: 25,S$GLB,, | Performed by: INTERNAL MEDICINE

## 2020-09-30 RX ORDER — AZITHROMYCIN 250 MG/1
TABLET, FILM COATED ORAL
COMMUNITY
Start: 2020-06-25 | End: 2020-09-30 | Stop reason: ALTCHOICE

## 2020-09-30 NOTE — PROGRESS NOTES
Subjective:      Patient ID: Nato Ng is a 35 y.o. male.    Chief Complaint: Executive Health    HPI     It was a pleasure to see you for your Executive Health Physical on 9/30/20.   You are a 35-year-old  gentleman with a past medical history of childhood  asthma, chronic back pain, and anxiety.     Your current medications include   xyzal and flonase.     Your past surgical history includes right ankle  surgery.  You have an allergy to penicillin, which  resulted in a rash.  You smoke approximately one pack  of cigarettes per week.  You do not use alcohol.     Your family history includes a father with nonmelanoma  skin cancer and is otherwise healthy.  Your mother is  healthy.  Your siblings are healthy.     PREVENTIVE MEDICINE:  Your tetanus, diphtheria and  whooping cough vaccine and your pneumonia vaccine are  up-to-date with [facility] as of March 8, 2017. Flu vaccine and second and final dose of your hepatitis A vaccine were given on the day or your executive health exam.        Review of Systems   Constitutional: Negative for chills and fever.   HENT: Negative for ear pain and sore throat.    Respiratory: Negative for cough.    Cardiovascular: Negative for chest pain.   Gastrointestinal: Negative for abdominal pain and blood in stool.   Genitourinary: Negative for dysuria and hematuria.   Neurological: Negative for seizures and syncope.     Objective:   /88 (BP Location: Right arm, Patient Position: Sitting, BP Method: Large (Manual))   Pulse 92   Temp 97.1 °F (36.2 °C) (Tympanic)   Ht 6' (1.829 m)   Wt 120 kg (264 lb 8.8 oz)   BMI 35.88 kg/m²     Physical Exam  Constitutional:       General: He is not in acute distress.     Appearance: He is well-developed.   HENT:      Head: Normocephalic and atraumatic.   Eyes:      Pupils: Pupils are equal, round, and reactive to light.   Neck:      Musculoskeletal: Neck supple.      Thyroid: No thyromegaly.   Cardiovascular:      Rate and Rhythm: Normal  rate and regular rhythm.   Pulmonary:      Breath sounds: Normal breath sounds. No wheezing or rales.   Abdominal:      General: Bowel sounds are normal.      Palpations: Abdomen is soft.      Tenderness: There is no abdominal tenderness.   Lymphadenopathy:      Cervical: No cervical adenopathy.   Skin:     General: Skin is warm and dry.   Neurological:      Mental Status: He is alert and oriented to person, place, and time.   Psychiatric:         Behavior: Behavior normal.         Assessment:     1. Routine general medical examination at a health care facility    2. Need for influenza vaccination    3. Need for hepatitis A immunization      Plan:   Routine general medical examination at a health care facility  Here today for annual prev exam.  Compliant with meds without significant side effects. Energy and appetite are good.   -     Hepatitis C Antibody; Future; Expected date: 09/30/2020    Need for influenza vaccination  -     Influenza - Quadrivalent *Preferred* (6 months+) (PF)    Need for hepatitis A immunization  -     (In Office Administered) Hepatitis A Vaccine (Adult) (IM)    See exec health letter for details.     Lab Frequency Next Occurrence       Problem List Items Addressed This Visit     None      Visit Diagnoses     Routine general medical examination at a health care facility    -  Primary    Relevant Orders    Hepatitis C Antibody (Completed)    Need for influenza vaccination        Relevant Orders    Influenza - Quadrivalent *Preferred* (6 months+) (PF) (Completed)    Need for hepatitis A immunization        Relevant Orders    (In Office Administered) Hepatitis A Vaccine (Adult) (IM) (Completed)          Follow up if symptoms worsen or fail to improve.

## 2020-09-30 NOTE — TELEPHONE ENCOUNTER
Informed pt that he would have schedule his sleep study with pulmonary. Transferred pt to the appt desk.  Pt verbalized understanding.    MARIBEL Godoy

## 2020-09-30 NOTE — TELEPHONE ENCOUNTER
----- Message from Marlene Lincoln sent at 9/30/2020 11:37 AM CDT -----  Name of Who is Calling: SEBAS UNGER  What is the request in detail: Patient is calling to speak to the nurse in regards to his appointment today and being referred to another doctor. Please advise Can the clinic reply by [facility]: No What Number to Call Back if not in [facility]: [phone]

## 2020-09-30 NOTE — TELEPHONE ENCOUNTER
Please let this patient know that he will have to have an appointment with Ms. Carlene Kilgore in pulmonary regarding sleep study. Assist him in scheduling if possible. Thanks.

## 2020-09-30 NOTE — PROGRESS NOTES
Nutrition Assessment  Client name:  Nato Ng  :  1985  Age:  35 y.o.  Gender:  male    Client states:  Very pleasant employee from [employer] here for his annual wellness exam with [employer]. Reports not being able to exercise for the past 6 months due to spinal fusion surgery, but has recently been released to exercise again. During those 6 months patient reports gaining weight due to lack of exercise and also did not make the best nutrition choices. As of two weeks ago patient began walking daily for weight loss (goal of 220 lbs). Patient plans to slowly get back into lifting weights. Patient also began fasting (consuming 1 meal daily) and asked my thoughts on fasting for weight loss. Reports energy being stable but unsure if he is losing weight yet. Reports being a smoker. Does not drink alcohol.     Anthropometrics  Height:  6'     Weight:  264 lbs  BMI:  35.88  % Body Fat:  n/a    Clinical Signs/Symptoms  N/V/D:  none  Appetite (Good, Fair, or Poor):  good      Past Medical History:   Diagnosis Date    Bulging lumbar disc        Past Surgical History:   Procedure Laterality Date    LEG SURGERY         Medications    has a current medication list which includes the following prescription(s): albuterol, bupropion, fluticasone propionate, gabapentin, levocetirizine, meloxicam, sildenafil, and zolpidem.    Vitamins, Minerals, and/or Supplements:  multivitamin     Food/Medication Interactions:  Reviewed     Food Allergies or Intolerances:  NKFA     Social History    Marital status:  Single  Employment:  [employer]    Social History     Tobacco Use    Smoking status: Current Every Day Smoker     Packs/day: 0.50     Years: 3.00     Pack years: 1.50     Types: Cigarettes     Start date:     Smokeless tobacco: Current User     Types: Snuff   Substance Use Topics    Alcohol use: No     Frequency: Monthly or less     Drinks per session: 5 or 6     Binge frequency: Less than monthly         Lab Reports   Total Cholesterol:  182    Triglycerides:  214  HDL:  32  LDL:  107.2   Glucose:  101  HbA1c:  pending  BP:  110/88     Food History  For the past 2 weeks patient reports fasting (consuming 1 meal per day). Eats a variety of food choices. Recent meals at fire station: burger (patient on ate meat + lettuce), stuffed bellpeppers (bellpepper, brown rice, shrimp). Enjoys a variety of fruits and vegetables. Makes a point to consume a vegetable with his 1 daily meal.   *Fluid intake:  water    Exercise History:  Walking (began 2 weeks ago) 1.5-2 miles daily    Cultural/Spiritual/Personal Preferences:  None noted    Support System:  spouse    State of Change:  contemplation    Barriers to Change:  none    Diagnosis    Obesity related to excess energy intake as evidenced by BMI 35.    Intervention    RMR (Method: [place]-St. Jeor):  2184 kcal  Activity Factor:  1.4  MARK:  3057 - 500 = 2557 kcal    Goals:  1.  Aim for 5% weight loss over the course of the next 3 months to work towards goal weight of 220 lbs.   2.  Consume 3 balanced meals daily for the My Plate Method.  3.  Choose 1 daily snack from Healthy Snack List.    Nutrition Education  Lipid panel and glucose were available at time of nutrition consultation and were reviewed with patient. Made patient aware of diet/lifestyle changes to consider for improvements with elevated triglycerides and low HDL. Provided contact information for smoking cessation program. Made patient aware of harm being done with only one meal daily and how daily nutrient needs are not being met. Reviewed My Plate Method, Healthy Snack List, and protein list in Meal Planning Guide. Discussed importance of portion control when aiming for weight loss. Encouraged patient to call/e-mail if he has any questions after leaving today's appointment.     Patient verbalized understanding of nutrition education and recommendations received.    Handouts Provided  Meal Planning  Guide  Restaurant Guide  Eat Fit Shopping List  Eat Fit Angela  Fast Food Guide  My Plate Method  Healthy Snack List    Monitoring/Evaluation    Monitor the following:  Weight  BMI  Caloric intake  Labs:  Lipid Panel, HgbA1c, Glucose    Follow Up Plan:  Communication with referring healthcare provider is unnecessary at this time as patient presented as part of annual wellness exam.  However, will follow up with patient in 1-2 years.

## 2020-09-30 NOTE — PROGRESS NOTES
Executive Health Screening    Nato Ng was seen 09/30/2020 for an executive health hearing screen.      Results reveal normal hearing from 250-8000 Hz for the right ear, and  mild hearing loss 250-8000 Hz for the left ear.     Otoscopy was unremarkable.    Recommendations:  1. Hearing protection in all noise.  2. Annual audiograms.      Patient was counseled on the above findings.

## 2020-10-01 LAB
HCV AB SERPL QL IA: NEGATIVE
LEAD BLD-MCNC: <1 MCG/DL
STATE OF RESIDENCE: NORMAL

## 2020-12-14 ENCOUNTER — PATIENT MESSAGE (OUTPATIENT)
Dept: FAMILY MEDICINE | Facility: CLINIC | Age: 35
End: 2020-12-14

## 2020-12-14 ENCOUNTER — OFFICE VISIT (OUTPATIENT)
Dept: FAMILY MEDICINE | Facility: CLINIC | Age: 35
End: 2020-12-14
Payer: COMMERCIAL

## 2020-12-14 DIAGNOSIS — R03.0 ELEVATED BLOOD PRESSURE READING WITHOUT DIAGNOSIS OF HYPERTENSION: ICD-10-CM

## 2020-12-14 DIAGNOSIS — J40 SINOBRONCHITIS: Primary | ICD-10-CM

## 2020-12-14 DIAGNOSIS — J32.9 SINOBRONCHITIS: Primary | ICD-10-CM

## 2020-12-14 DIAGNOSIS — F41.9 ANXIETY: ICD-10-CM

## 2020-12-14 DIAGNOSIS — Z72.0 TOBACCO ABUSE: ICD-10-CM

## 2020-12-14 PROCEDURE — 99214 OFFICE O/P EST MOD 30 MIN: CPT | Mod: 95,,, | Performed by: FAMILY MEDICINE

## 2020-12-14 PROCEDURE — 99214 PR OFFICE/OUTPT VISIT, EST, LEVL IV, 30-39 MIN: ICD-10-PCS | Mod: 95,,, | Performed by: FAMILY MEDICINE

## 2020-12-14 RX ORDER — METHYLPREDNISOLONE 4 MG/1
TABLET ORAL
Qty: 1 PACKAGE | Refills: 0 | Status: SHIPPED | OUTPATIENT
Start: 2020-12-14 | End: 2021-07-21

## 2020-12-14 RX ORDER — AZITHROMYCIN 250 MG/1
TABLET, FILM COATED ORAL
Qty: 6 TABLET | Refills: 0 | Status: SHIPPED | OUTPATIENT
Start: 2020-12-14 | End: 2020-12-19

## 2020-12-14 NOTE — PROGRESS NOTES
Subjective:       Patient ID: Nato Ng is a 35 y.o. male.    Chief Complaint: No chief complaint on file.      HPI Comments:       Current Outpatient Medications:     azithromycin (Z-SYLVESTER) 250 MG tablet, Take 2 tablets by mouth on day 1; Take 1 tablet by mouth on days 2-5, Disp: 6 tablet, Rfl: 0    fluticasone propionate (FLONASE) 50 mcg/actuation nasal spray, 1 spray (50 mcg total) by Each Nare route once daily. (Patient not taking: Reported on 9/30/2020), Disp: 1 Bottle, Rfl: 5    levocetirizine (XYZAL) 5 MG tablet, TAKE 1 TABLET(5 MG) BY MOUTH EVERY EVENING, Disp: 30 tablet, Rfl: 11    methylPREDNISolone (MEDROL DOSEPACK) 4 mg tablet, use as directed, Disp: 1 Package, Rfl: 0    The patient location is:  Home  The chief complaint leading to consultation is:  Sinus pain    Visit type:  Tele audiovisual    Face to Face time with patient:  Approximately 15 min      Each patient to whom he or she provides medical services by telemedicine is:  (1) informed of the relationship between the physician and patient and the respective role of any other health care provider with respect to management of the patient; and (2) notified that he or she may decline to receive medical services by telemedicine and may withdraw from such care at any time.    Notes:     Complains of a one-week history of URI symptoms at of now moved into his chest.  Having lot of nasal congestion and sinus pressure.  Coughing but no productivity.  No fever chills.  No shortness of breath.  No change in taste or smell.  Had a rapid COVID test this morning that was negative.  Taking Mucinex and Xyzal.    Smoking about 1 pack per week    Spinal fusion surgery was very successful.  90% pain free.    Weaned himself off the Wellbutrin.  Doing well with respect to anxiety.    Cough  This is a new problem. The current episode started in the past 7 days. The problem has been gradually worsening. The problem occurs hourly. The cough is productive of  sputum. Associated symptoms include ear congestion, ear pain, nasal congestion, postnasal drip, rhinorrhea, a sore throat and wheezing. Pertinent negatives include no chest pain, chills, fever, headaches, heartburn, hemoptysis, myalgias, rash, shortness of breath, sweats or weight loss. Nothing aggravates the symptoms. He has tried OTC cough suppressant for the symptoms. The treatment provided no relief. His past medical history is significant for asthma, bronchitis and pneumonia. There is no history of bronchiectasis, COPD, emphysema or environmental allergies.     Review of Systems   Constitutional: Negative for activity change, appetite change, chills, fever and weight loss.   HENT: Positive for ear pain, postnasal drip, rhinorrhea and sore throat.    Respiratory: Positive for cough and wheezing. Negative for hemoptysis and shortness of breath.    Cardiovascular: Negative for chest pain.   Gastrointestinal: Negative for abdominal pain, diarrhea, heartburn and nausea.   Genitourinary: Negative for difficulty urinating.   Musculoskeletal: Negative for arthralgias and myalgias.   Skin: Negative for rash.   Allergic/Immunologic: Negative for environmental allergies.   Neurological: Negative for dizziness and headaches.       Objective:      There were no vitals filed for this visit.  Physical Exam  Constitutional:       Appearance: He is ill-appearing. He is not toxic-appearing.   HENT:      Head: Normocephalic.   Neck:      Musculoskeletal: Neck supple.   Pulmonary:      Effort: Pulmonary effort is normal. No respiratory distress.   Neurological:      Mental Status: He is oriented to person, place, and time.   Psychiatric:         Mood and Affect: Mood normal.         Behavior: Behavior normal.         Thought Content: Thought content normal.         Judgment: Judgment normal.         Assessment:       1. Sinobronchitis    2. Elevated blood pressure reading without diagnosis of hypertension    3. Anxiety    4.  Tobacco abuse        Plan:   Sinobronchitis  Comments:  Z-Sylvester and Medrol Dosepak.  Recently COVID negative.  fluids and rest.  Mucinex as needed    Elevated blood pressure reading without diagnosis of hypertension  Comments:  Has intermittent elevated blood pressures.  Most recently was normal    Anxiety  Comments:  Doing well off all medication    Tobacco abuse  Comments:  Has cut back considerably    Other orders  -     azithromycin (Z-SYLVESTER) 250 MG tablet; Take 2 tablets by mouth on day 1; Take 1 tablet by mouth on days 2-5  Dispense: 6 tablet; Refill: 0  -     methylPREDNISolone (MEDROL DOSEPACK) 4 mg tablet; use as directed  Dispense: 1 Package; Refill: 0

## 2020-12-23 ENCOUNTER — PATIENT MESSAGE (OUTPATIENT)
Dept: FAMILY MEDICINE | Facility: CLINIC | Age: 35
End: 2020-12-23

## 2020-12-23 RX ORDER — PREDNISONE 20 MG/1
40 TABLET ORAL DAILY
Qty: 10 TABLET | Refills: 0 | Status: SHIPPED | OUTPATIENT
Start: 2020-12-23 | End: 2021-07-21

## 2020-12-23 RX ORDER — DOXYCYCLINE 100 MG/1
100 CAPSULE ORAL 2 TIMES DAILY
Qty: 20 CAPSULE | Refills: 0 | Status: SHIPPED | OUTPATIENT
Start: 2020-12-23 | End: 2021-07-21

## 2021-01-22 ENCOUNTER — CLINICAL SUPPORT (OUTPATIENT)
Dept: OTHER | Facility: CLINIC | Age: 36
End: 2021-01-22
Payer: COMMERCIAL

## 2021-01-22 DIAGNOSIS — Z00.8 ENCOUNTER FOR OTHER GENERAL EXAMINATION: ICD-10-CM

## 2021-01-24 VITALS — BODY MASS INDEX: 35.88 KG/M2 | HEIGHT: 72 IN

## 2021-01-24 LAB
GLUCOSE SERPL-MCNC: 99 MG/DL (ref 60–140)
HDLC SERPL-MCNC: 26 MG/DL
POC CHOLESTEROL, LDL (DOCK): 125 MG/DL
POC CHOLESTEROL, TOTAL: 201 MG/DL
TRIGL SERPL-MCNC: 253 MG/DL

## 2021-05-06 ENCOUNTER — PATIENT MESSAGE (OUTPATIENT)
Dept: RESEARCH | Facility: HOSPITAL | Age: 36
End: 2021-05-06

## 2021-07-21 ENCOUNTER — OFFICE VISIT (OUTPATIENT)
Dept: INTERNAL MEDICINE | Facility: CLINIC | Age: 36
End: 2021-07-21
Payer: COMMERCIAL

## 2021-07-21 DIAGNOSIS — Z72.0 TOBACCO USE: ICD-10-CM

## 2021-07-21 DIAGNOSIS — E66.9 OBESITY (BMI 30.0-34.9): ICD-10-CM

## 2021-07-21 DIAGNOSIS — F32.A ANXIETY AND DEPRESSION: Primary | ICD-10-CM

## 2021-07-21 DIAGNOSIS — F41.9 ANXIETY AND DEPRESSION: Primary | ICD-10-CM

## 2021-07-21 PROCEDURE — 99214 OFFICE O/P EST MOD 30 MIN: CPT | Mod: 95,,, | Performed by: NURSE PRACTITIONER

## 2021-07-21 PROCEDURE — 99214 PR OFFICE/OUTPT VISIT, EST, LEVL IV, 30-39 MIN: ICD-10-PCS | Mod: 95,,, | Performed by: NURSE PRACTITIONER

## 2021-07-21 RX ORDER — SERTRALINE HYDROCHLORIDE 25 MG/1
25 TABLET, FILM COATED ORAL DAILY
Qty: 30 TABLET | Refills: 0 | Status: SHIPPED | OUTPATIENT
Start: 2021-07-21 | End: 2021-08-20

## 2021-07-28 ENCOUNTER — CLINICAL SUPPORT (OUTPATIENT)
Dept: SMOKING CESSATION | Facility: CLINIC | Age: 36
End: 2021-07-28

## 2021-07-28 DIAGNOSIS — F17.210 MODERATE SMOKER (20 OR LESS PER DAY): Primary | ICD-10-CM

## 2021-07-28 PROCEDURE — 99404 PR PREVENT COUNSEL,INDIV,60 MIN: ICD-10-PCS | Mod: S$GLB,,, | Performed by: GENERAL PRACTICE

## 2021-07-28 PROCEDURE — 99404 PREV MED CNSL INDIV APPRX 60: CPT | Mod: S$GLB,,, | Performed by: GENERAL PRACTICE

## 2021-07-28 PROCEDURE — 99999 PR PBB SHADOW E&M-EST. PATIENT-LVL I: CPT | Mod: PBBFAC,,,

## 2021-07-28 PROCEDURE — 99999 PR PBB SHADOW E&M-EST. PATIENT-LVL I: ICD-10-PCS | Mod: PBBFAC,,,

## 2021-07-28 RX ORDER — IBUPROFEN 200 MG
1 TABLET ORAL DAILY
Qty: 28 PATCH | Refills: 0 | Status: SHIPPED | OUTPATIENT
Start: 2021-07-28 | End: 2021-08-25 | Stop reason: SDUPTHER

## 2021-07-28 RX ORDER — ASPIRIN/CALCIUM CARB/MAGNESIUM 325 MG
4 TABLET ORAL
Qty: 270 LOZENGE | Refills: 0 | Status: SHIPPED | OUTPATIENT
Start: 2021-07-28 | End: 2022-09-02

## 2021-08-04 ENCOUNTER — CLINICAL SUPPORT (OUTPATIENT)
Dept: SMOKING CESSATION | Facility: CLINIC | Age: 36
End: 2021-08-04
Payer: COMMERCIAL

## 2021-08-04 DIAGNOSIS — F17.200 LIGHT TOBACCO SMOKER: Primary | ICD-10-CM

## 2021-08-04 PROCEDURE — 99404 PR PREVENT COUNSEL,INDIV,60 MIN: ICD-10-PCS | Mod: S$GLB,,, | Performed by: GENERAL PRACTICE

## 2021-08-04 PROCEDURE — 99404 PREV MED CNSL INDIV APPRX 60: CPT | Mod: S$GLB,,, | Performed by: GENERAL PRACTICE

## 2021-08-04 PROCEDURE — 99999 PR PBB SHADOW E&M-EST. PATIENT-LVL I: ICD-10-PCS | Mod: PBBFAC,,,

## 2021-08-04 PROCEDURE — 99999 PR PBB SHADOW E&M-EST. PATIENT-LVL I: CPT | Mod: PBBFAC,,,

## 2021-08-10 ENCOUNTER — TELEPHONE (OUTPATIENT)
Dept: SMOKING CESSATION | Facility: CLINIC | Age: 36
End: 2021-08-10

## 2021-08-18 ENCOUNTER — CLINICAL SUPPORT (OUTPATIENT)
Dept: SMOKING CESSATION | Facility: CLINIC | Age: 36
End: 2021-08-18

## 2021-08-18 DIAGNOSIS — F17.200 LIGHT TOBACCO SMOKER: Primary | ICD-10-CM

## 2021-08-18 PROCEDURE — 99999 PR PBB SHADOW E&M-EST. PATIENT-LVL I: CPT | Mod: PBBFAC,,,

## 2021-08-18 PROCEDURE — 99402 PREV MED CNSL INDIV APPRX 30: CPT | Mod: S$GLB,,, | Performed by: GENERAL PRACTICE

## 2021-08-18 PROCEDURE — 99402 PR PREVENT COUNSEL,INDIV,30 MIN: ICD-10-PCS | Mod: S$GLB,,, | Performed by: GENERAL PRACTICE

## 2021-08-18 PROCEDURE — 99999 PR PBB SHADOW E&M-EST. PATIENT-LVL I: ICD-10-PCS | Mod: PBBFAC,,,

## 2021-08-20 ENCOUNTER — OFFICE VISIT (OUTPATIENT)
Dept: INTERNAL MEDICINE | Facility: CLINIC | Age: 36
End: 2021-08-20
Payer: COMMERCIAL

## 2021-08-20 DIAGNOSIS — F41.9 ANXIETY AND DEPRESSION: ICD-10-CM

## 2021-08-20 DIAGNOSIS — J00 ACUTE NASOPHARYNGITIS: ICD-10-CM

## 2021-08-20 DIAGNOSIS — J06.9 UPPER RESPIRATORY TRACT INFECTION, UNSPECIFIED TYPE: Primary | ICD-10-CM

## 2021-08-20 DIAGNOSIS — F32.A ANXIETY AND DEPRESSION: ICD-10-CM

## 2021-08-20 PROBLEM — R53.83 FATIGUE: Status: RESOLVED | Noted: 2018-04-06 | Resolved: 2021-08-20

## 2021-08-20 PROBLEM — G47.00 INSOMNIA: Status: RESOLVED | Noted: 2018-04-06 | Resolved: 2021-08-20

## 2021-08-20 PROCEDURE — 1159F PR MEDICATION LIST DOCUMENTED IN MEDICAL RECORD: ICD-10-PCS | Mod: CPTII,,, | Performed by: FAMILY MEDICINE

## 2021-08-20 PROCEDURE — 1159F MED LIST DOCD IN RCRD: CPT | Mod: CPTII,,, | Performed by: FAMILY MEDICINE

## 2021-08-20 PROCEDURE — 99214 OFFICE O/P EST MOD 30 MIN: CPT | Mod: 95,,, | Performed by: FAMILY MEDICINE

## 2021-08-20 PROCEDURE — 1160F PR REVIEW ALL MEDS BY PRESCRIBER/CLIN PHARMACIST DOCUMENTED: ICD-10-PCS | Mod: CPTII,,, | Performed by: FAMILY MEDICINE

## 2021-08-20 PROCEDURE — 1160F RVW MEDS BY RX/DR IN RCRD: CPT | Mod: CPTII,,, | Performed by: FAMILY MEDICINE

## 2021-08-20 PROCEDURE — 99214 PR OFFICE/OUTPT VISIT, EST, LEVL IV, 30-39 MIN: ICD-10-PCS | Mod: 95,,, | Performed by: FAMILY MEDICINE

## 2021-08-20 RX ORDER — LEVOCETIRIZINE DIHYDROCHLORIDE 5 MG/1
5 TABLET, FILM COATED ORAL NIGHTLY
Qty: 90 TABLET | Refills: 3 | Status: SHIPPED | OUTPATIENT
Start: 2021-08-20 | End: 2021-12-31

## 2021-08-20 RX ORDER — METHYLPREDNISOLONE 4 MG/1
TABLET ORAL
Qty: 1 PACKAGE | Refills: 0 | Status: SHIPPED | OUTPATIENT
Start: 2021-08-20 | End: 2021-08-25

## 2021-08-20 RX ORDER — DOXYCYCLINE 100 MG/1
100 CAPSULE ORAL EVERY 12 HOURS
Qty: 14 CAPSULE | Refills: 0 | Status: SHIPPED | OUTPATIENT
Start: 2021-08-20 | End: 2021-08-25

## 2021-08-20 RX ORDER — AZELASTINE 1 MG/ML
1 SPRAY, METERED NASAL 2 TIMES DAILY
Qty: 30 ML | Refills: 0 | Status: SHIPPED | OUTPATIENT
Start: 2021-08-20 | End: 2022-09-02

## 2021-08-20 RX ORDER — BENZONATATE 100 MG/1
100 CAPSULE ORAL 3 TIMES DAILY PRN
Qty: 30 CAPSULE | Refills: 0 | Status: SHIPPED | OUTPATIENT
Start: 2021-08-20 | End: 2021-08-25

## 2021-08-20 RX ORDER — PROMETHAZINE HYDROCHLORIDE AND DEXTROMETHORPHAN HYDROBROMIDE 6.25; 15 MG/5ML; MG/5ML
5 SYRUP ORAL NIGHTLY
Qty: 118 ML | Refills: 0 | Status: SHIPPED | OUTPATIENT
Start: 2021-08-20 | End: 2023-01-06

## 2021-08-20 RX ORDER — SERTRALINE HYDROCHLORIDE 50 MG/1
50 TABLET, FILM COATED ORAL DAILY
Qty: 90 TABLET | Refills: 3 | Status: SHIPPED | OUTPATIENT
Start: 2021-08-20 | End: 2021-10-21 | Stop reason: SDUPTHER

## 2021-08-20 RX ORDER — FLUTICASONE PROPIONATE 50 MCG
1 SPRAY, SUSPENSION (ML) NASAL DAILY
Qty: 16 ML | Refills: 11 | Status: SHIPPED | OUTPATIENT
Start: 2021-08-20 | End: 2024-01-22 | Stop reason: ALTCHOICE

## 2021-08-23 PROBLEM — Z00.00 ROUTINE GENERAL MEDICAL EXAMINATION AT A HEALTH CARE FACILITY: Status: ACTIVE | Noted: 2021-08-23

## 2021-08-25 ENCOUNTER — CLINICAL SUPPORT (OUTPATIENT)
Dept: INTERNAL MEDICINE | Facility: CLINIC | Age: 36
End: 2021-08-25
Payer: COMMERCIAL

## 2021-08-25 ENCOUNTER — CLINICAL SUPPORT (OUTPATIENT)
Dept: INTERNAL MEDICINE | Facility: CLINIC | Age: 36
End: 2021-08-25

## 2021-08-25 ENCOUNTER — OFFICE VISIT (OUTPATIENT)
Dept: INTERNAL MEDICINE | Facility: CLINIC | Age: 36
End: 2021-08-25

## 2021-08-25 ENCOUNTER — CLINICAL SUPPORT (OUTPATIENT)
Dept: AUDIOLOGY | Facility: CLINIC | Age: 36
End: 2021-08-25

## 2021-08-25 ENCOUNTER — CLINICAL SUPPORT (OUTPATIENT)
Dept: SMOKING CESSATION | Facility: CLINIC | Age: 36
End: 2021-08-25

## 2021-08-25 VITALS
RESPIRATION RATE: 16 BRPM | HEART RATE: 88 BPM | HEIGHT: 72 IN | SYSTOLIC BLOOD PRESSURE: 127 MMHG | WEIGHT: 273.38 LBS | TEMPERATURE: 98 F | BODY MASS INDEX: 37.03 KG/M2 | DIASTOLIC BLOOD PRESSURE: 86 MMHG

## 2021-08-25 DIAGNOSIS — E78.1 HYPERTRIGLYCERIDEMIA: ICD-10-CM

## 2021-08-25 DIAGNOSIS — Z01.12 HEARING CONSERVATION AND TREATMENT EXAM: Primary | ICD-10-CM

## 2021-08-25 DIAGNOSIS — G47.00 INSOMNIA, UNSPECIFIED TYPE: ICD-10-CM

## 2021-08-25 DIAGNOSIS — Z71.3 DIETARY COUNSELING: ICD-10-CM

## 2021-08-25 DIAGNOSIS — Z00.00 ROUTINE GENERAL MEDICAL EXAMINATION AT A HEALTH CARE FACILITY: Primary | ICD-10-CM

## 2021-08-25 DIAGNOSIS — F17.210 MODERATE SMOKER (20 OR LESS PER DAY): ICD-10-CM

## 2021-08-25 DIAGNOSIS — F17.200 LIGHT TOBACCO SMOKER: Primary | ICD-10-CM

## 2021-08-25 DIAGNOSIS — F17.200 NICOTINE DEPENDENCE, UNCOMPLICATED: ICD-10-CM

## 2021-08-25 LAB
ALBUMIN SERPL BCP-MCNC: 3.8 G/DL (ref 3.5–5.2)
ALP SERPL-CCNC: 98 U/L (ref 55–135)
ALT SERPL W/O P-5'-P-CCNC: 29 U/L (ref 10–44)
ANION GAP SERPL CALC-SCNC: 11 MMOL/L (ref 8–16)
AST SERPL-CCNC: 14 U/L (ref 10–40)
BILIRUB SERPL-MCNC: 1.3 MG/DL (ref 0.1–1)
BILIRUB UR QL STRIP: NEGATIVE
BUN SERPL-MCNC: 14 MG/DL (ref 6–20)
CALCIUM SERPL-MCNC: 9.6 MG/DL (ref 8.7–10.5)
CHLORIDE SERPL-SCNC: 102 MMOL/L (ref 95–110)
CHOLEST SERPL-MCNC: 190 MG/DL (ref 120–199)
CHOLEST/HDLC SERPL: 5.1 {RATIO} (ref 2–5)
CLARITY UR: CLEAR
CO2 SERPL-SCNC: 27 MMOL/L (ref 23–29)
COLOR UR: ABNORMAL
CREAT SERPL-MCNC: 1 MG/DL (ref 0.5–1.4)
ERYTHROCYTE [DISTWIDTH] IN BLOOD BY AUTOMATED COUNT: 13.7 % (ref 11.5–14.5)
EST. GFR  (AFRICAN AMERICAN): >60 ML/MIN/1.73 M^2
EST. GFR  (NON AFRICAN AMERICAN): >60 ML/MIN/1.73 M^2
ESTIMATED AVG GLUCOSE: 114 MG/DL (ref 68–131)
GLUCOSE SERPL-MCNC: 93 MG/DL (ref 70–110)
GLUCOSE UR QL STRIP: NEGATIVE
HBA1C MFR BLD: 5.6 % (ref 4–5.6)
HCT VFR BLD AUTO: 48.4 % (ref 40–54)
HDLC SERPL-MCNC: 37 MG/DL (ref 40–75)
HDLC SERPL: 19.5 % (ref 20–50)
HGB BLD-MCNC: 16.6 G/DL (ref 14–18)
HGB UR QL STRIP: NEGATIVE
KETONES UR QL STRIP: NEGATIVE
LDLC SERPL CALC-MCNC: 79.6 MG/DL (ref 63–159)
LEUKOCYTE ESTERASE UR QL STRIP: NEGATIVE
MCH RBC QN AUTO: 28.8 PG (ref 27–31)
MCHC RBC AUTO-ENTMCNC: 34.3 G/DL (ref 32–36)
MCV RBC AUTO: 84 FL (ref 82–98)
NITRITE UR QL STRIP: NEGATIVE
NONHDLC SERPL-MCNC: 153 MG/DL
PH UR STRIP: 6 [PH] (ref 5–8)
PLATELET # BLD AUTO: 333 K/UL (ref 150–450)
PMV BLD AUTO: 8.7 FL (ref 9.2–12.9)
POTASSIUM SERPL-SCNC: 4.2 MMOL/L (ref 3.5–5.1)
PROT SERPL-MCNC: 7.7 G/DL (ref 6–8.4)
PROT UR QL STRIP: NEGATIVE
RBC # BLD AUTO: 5.76 M/UL (ref 4.6–6.2)
SODIUM SERPL-SCNC: 140 MMOL/L (ref 136–145)
SP GR UR STRIP: 1.02 (ref 1–1.03)
TRIGL SERPL-MCNC: 367 MG/DL (ref 30–150)
URN SPEC COLLECT METH UR: ABNORMAL
WBC # BLD AUTO: 11.75 K/UL (ref 3.9–12.7)

## 2021-08-25 PROCEDURE — 92552 PR PURE TONE AUDIOMETRY, AIR: ICD-10-PCS | Mod: S$GLB,,, | Performed by: AUDIOLOGIST-HEARING AID FITTER

## 2021-08-25 PROCEDURE — 99402 PREV MED CNSL INDIV APPRX 30: CPT | Mod: S$GLB,,, | Performed by: GENERAL PRACTICE

## 2021-08-25 PROCEDURE — 99999 PR PBB SHADOW E&M-EST. PATIENT-LVL III: ICD-10-PCS | Mod: PBBFAC,,, | Performed by: FAMILY MEDICINE

## 2021-08-25 PROCEDURE — 99395 PR PREVENTIVE VISIT,EST,18-39: ICD-10-PCS | Mod: S$GLB,,, | Performed by: FAMILY MEDICINE

## 2021-08-25 PROCEDURE — 97802 MEDICAL NUTRITION INDIV IN: CPT | Mod: S$GLB,,, | Performed by: INTERNAL MEDICINE

## 2021-08-25 PROCEDURE — 92552 PURE TONE AUDIOMETRY AIR: CPT | Mod: S$GLB,,, | Performed by: AUDIOLOGIST-HEARING AID FITTER

## 2021-08-25 PROCEDURE — 80061 LIPID PANEL: CPT | Performed by: FAMILY MEDICINE

## 2021-08-25 PROCEDURE — 80053 COMPREHEN METABOLIC PANEL: CPT | Performed by: FAMILY MEDICINE

## 2021-08-25 PROCEDURE — 99395 PREV VISIT EST AGE 18-39: CPT | Mod: S$GLB,,, | Performed by: FAMILY MEDICINE

## 2021-08-25 PROCEDURE — 97802 PR MED NUTR THER, 1ST, INDIV, EA 15 MIN: ICD-10-PCS | Mod: S$GLB,,, | Performed by: INTERNAL MEDICINE

## 2021-08-25 PROCEDURE — 85027 COMPLETE CBC AUTOMATED: CPT | Performed by: FAMILY MEDICINE

## 2021-08-25 PROCEDURE — 83036 HEMOGLOBIN GLYCOSYLATED A1C: CPT | Performed by: FAMILY MEDICINE

## 2021-08-25 PROCEDURE — 99402 PR PREVENT COUNSEL,INDIV,30 MIN: ICD-10-PCS | Mod: S$GLB,,, | Performed by: GENERAL PRACTICE

## 2021-08-25 PROCEDURE — 83655 ASSAY OF LEAD: CPT | Performed by: FAMILY MEDICINE

## 2021-08-25 PROCEDURE — 99999 PR PBB SHADOW E&M-EST. PATIENT-LVL III: CPT | Mod: PBBFAC,,, | Performed by: FAMILY MEDICINE

## 2021-08-25 PROCEDURE — 99999 PR PBB SHADOW E&M-EST. PATIENT-LVL I: ICD-10-PCS | Mod: PBBFAC,,,

## 2021-08-25 PROCEDURE — 81003 URINALYSIS AUTO W/O SCOPE: CPT | Performed by: FAMILY MEDICINE

## 2021-08-25 PROCEDURE — 99999 PR PBB SHADOW E&M-EST. PATIENT-LVL I: CPT | Mod: PBBFAC,,,

## 2021-08-25 RX ORDER — TRAZODONE HYDROCHLORIDE 50 MG/1
TABLET ORAL
Qty: 166 TABLET | Refills: 0 | Status: SHIPPED | OUTPATIENT
Start: 2021-08-25 | End: 2022-09-02

## 2021-08-25 RX ORDER — IBUPROFEN 200 MG
1 TABLET ORAL DAILY
Qty: 14 PATCH | Refills: 0 | Status: SHIPPED | OUTPATIENT
Start: 2021-08-25 | End: 2022-09-02

## 2021-08-25 RX ORDER — ICOSAPENT ETHYL 1000 MG/1
2 CAPSULE ORAL 2 TIMES DAILY
Qty: 360 CAPSULE | Refills: 3 | Status: SHIPPED | OUTPATIENT
Start: 2021-08-25 | End: 2022-09-02

## 2021-08-27 LAB
LEAD BLD-MCNC: <1 MCG/DL
SPECIMEN SOURCE: NORMAL
STATE OF RESIDENCE: NORMAL

## 2021-08-31 ENCOUNTER — TELEPHONE (OUTPATIENT)
Dept: PRIMARY CARE CLINIC | Facility: CLINIC | Age: 36
End: 2021-08-31

## 2021-09-08 ENCOUNTER — TELEPHONE (OUTPATIENT)
Dept: SMOKING CESSATION | Facility: CLINIC | Age: 36
End: 2021-09-08

## 2021-09-29 ENCOUNTER — TELEPHONE (OUTPATIENT)
Dept: SMOKING CESSATION | Facility: CLINIC | Age: 36
End: 2021-09-29

## 2021-10-21 ENCOUNTER — PATIENT MESSAGE (OUTPATIENT)
Dept: INTERNAL MEDICINE | Facility: CLINIC | Age: 36
End: 2021-10-21
Payer: COMMERCIAL

## 2021-10-21 DIAGNOSIS — F41.9 ANXIETY AND DEPRESSION: ICD-10-CM

## 2021-10-21 DIAGNOSIS — F32.A ANXIETY AND DEPRESSION: ICD-10-CM

## 2021-10-21 RX ORDER — SERTRALINE HYDROCHLORIDE 50 MG/1
50 TABLET, FILM COATED ORAL DAILY
Qty: 30 TABLET | Refills: 0 | Status: SHIPPED | OUTPATIENT
Start: 2021-10-21 | End: 2022-09-02 | Stop reason: SDUPTHER

## 2021-11-22 PROBLEM — Z00.00 ROUTINE GENERAL MEDICAL EXAMINATION AT A HEALTH CARE FACILITY: Status: RESOLVED | Noted: 2021-08-23 | Resolved: 2021-11-22

## 2022-01-24 ENCOUNTER — CLINICAL SUPPORT (OUTPATIENT)
Dept: OTHER | Facility: CLINIC | Age: 37
End: 2022-01-24
Payer: COMMERCIAL

## 2022-01-24 DIAGNOSIS — Z00.8 ENCOUNTER FOR OTHER GENERAL EXAMINATION: ICD-10-CM

## 2022-01-25 VITALS — BODY MASS INDEX: 37.08 KG/M2 | HEIGHT: 72 IN

## 2022-01-25 LAB
GLUCOSE SERPL-MCNC: 105 MG/DL (ref 60–140)
HDLC SERPL-MCNC: 37 MG/DL
POC CHOLESTEROL, LDL (DOCK): 114 MG/DL
POC CHOLESTEROL, TOTAL: 193 MG/DL
TRIGL SERPL-MCNC: 209 MG/DL

## 2022-04-07 ENCOUNTER — OFFICE VISIT (OUTPATIENT)
Dept: FAMILY MEDICINE | Facility: CLINIC | Age: 37
End: 2022-04-07
Payer: COMMERCIAL

## 2022-04-07 ENCOUNTER — TELEPHONE (OUTPATIENT)
Dept: SMOKING CESSATION | Facility: CLINIC | Age: 37
End: 2022-04-07
Payer: COMMERCIAL

## 2022-04-07 VITALS
WEIGHT: 285.06 LBS | HEART RATE: 77 BPM | BODY MASS INDEX: 38.61 KG/M2 | SYSTOLIC BLOOD PRESSURE: 118 MMHG | HEIGHT: 72 IN | TEMPERATURE: 99 F | OXYGEN SATURATION: 97 % | DIASTOLIC BLOOD PRESSURE: 86 MMHG

## 2022-04-07 DIAGNOSIS — S46.212A RUPTURE OF LEFT DISTAL BICEPS TENDON, INITIAL ENCOUNTER: Primary | ICD-10-CM

## 2022-04-07 PROCEDURE — 3079F PR MOST RECENT DIASTOLIC BLOOD PRESSURE 80-89 MM HG: ICD-10-PCS | Mod: CPTII,S$GLB,, | Performed by: FAMILY MEDICINE

## 2022-04-07 PROCEDURE — 99213 PR OFFICE/OUTPT VISIT, EST, LEVL III, 20-29 MIN: ICD-10-PCS | Mod: S$GLB,,, | Performed by: FAMILY MEDICINE

## 2022-04-07 PROCEDURE — 3074F SYST BP LT 130 MM HG: CPT | Mod: CPTII,S$GLB,, | Performed by: FAMILY MEDICINE

## 2022-04-07 PROCEDURE — 3074F PR MOST RECENT SYSTOLIC BLOOD PRESSURE < 130 MM HG: ICD-10-PCS | Mod: CPTII,S$GLB,, | Performed by: FAMILY MEDICINE

## 2022-04-07 PROCEDURE — 99999 PR PBB SHADOW E&M-EST. PATIENT-LVL IV: CPT | Mod: PBBFAC,,, | Performed by: FAMILY MEDICINE

## 2022-04-07 PROCEDURE — 1159F PR MEDICATION LIST DOCUMENTED IN MEDICAL RECORD: ICD-10-PCS | Mod: CPTII,S$GLB,, | Performed by: FAMILY MEDICINE

## 2022-04-07 PROCEDURE — 3008F PR BODY MASS INDEX (BMI) DOCUMENTED: ICD-10-PCS | Mod: CPTII,S$GLB,, | Performed by: FAMILY MEDICINE

## 2022-04-07 PROCEDURE — 3079F DIAST BP 80-89 MM HG: CPT | Mod: CPTII,S$GLB,, | Performed by: FAMILY MEDICINE

## 2022-04-07 PROCEDURE — 99213 OFFICE O/P EST LOW 20 MIN: CPT | Mod: S$GLB,,, | Performed by: FAMILY MEDICINE

## 2022-04-07 PROCEDURE — 3008F BODY MASS INDEX DOCD: CPT | Mod: CPTII,S$GLB,, | Performed by: FAMILY MEDICINE

## 2022-04-07 PROCEDURE — 1159F MED LIST DOCD IN RCRD: CPT | Mod: CPTII,S$GLB,, | Performed by: FAMILY MEDICINE

## 2022-04-07 PROCEDURE — 99999 PR PBB SHADOW E&M-EST. PATIENT-LVL IV: ICD-10-PCS | Mod: PBBFAC,,, | Performed by: FAMILY MEDICINE

## 2022-04-07 RX ORDER — MELOXICAM 7.5 MG/1
7.5 TABLET ORAL DAILY
Qty: 30 TABLET | Refills: 0 | Status: SHIPPED | OUTPATIENT
Start: 2022-04-07 | End: 2022-06-20

## 2022-04-07 NOTE — PROGRESS NOTES
Chief Complaint:    Chief Complaint   Patient presents with    Arm Pain     Left arm pain       History of Present Illness:  Patient with a hx of lumbar spondylosis, anxiety and depression, hypertriglyceridemia, insomnia, and tobacco use presents today for L arm pain for ~ 1 month.     Was doing heavy lifting when the arm pain began. This has happened before but the pain hasn't gone away. Reports decreased strength.     ROS:  Review of Systems   Constitutional: Negative for appetite change, chills and fever.   HENT: Negative for congestion, ear pain, postnasal drip, rhinorrhea, sinus pressure and sinus pain.    Eyes: Negative for pain.   Respiratory: Negative for cough, chest tightness and shortness of breath.    Cardiovascular: Negative for chest pain and palpitations.   Gastrointestinal: Negative for abdominal pain, blood in stool, constipation, diarrhea and nausea.   Genitourinary: Negative for difficulty urinating, dysuria, flank pain and hematuria.   Musculoskeletal: Positive for arthralgias and myalgias.   Skin: Negative for pallor and wound.   Neurological: Negative for dizziness, tremors, speech difficulty, light-headedness and headaches.   Psychiatric/Behavioral: Negative for behavioral problems, dysphoric mood and sleep disturbance. The patient is not nervous/anxious.    All other systems reviewed and are negative.      Past Medical History:   Diagnosis Date    Bulging lumbar disc     Fatigue 4/6/2018    Insomnia 4/6/2018       Social History:  Social History     Socioeconomic History    Marital status:    Occupational History     Employer:  [employer] de[employer]   Tobacco Use    Smoking status: Current Every Day Smoker     Packs/day: 0.50     Years: 3.00     Pack years: 1.50     Types: Cigarettes     Start date: 2009    Smokeless tobacco: Current User     Types: Snuff    Tobacco comment: currently quitting   Substance and Sexual Activity    Alcohol use: No    Drug use: No    Sexual  activity: Yes     Partners: Female     Birth control/protection: None     Social Determinants of Health     Financial Resource Strain: Low Risk     Difficulty of Paying Living Expenses: Not hard at all   Food Insecurity: No Food Insecurity    Worried About Running Out of Food in the Last Year: Never true    Ran Out of Food in the Last Year: Never true   Transportation Needs: No Transportation Needs    Lack of Transportation (Medical): No    Lack of Transportation (Non-Medical): No   Physical Activity: Unknown    Days of Exercise per Week: Patient refused    Minutes of Exercise per Session: 20 min   Stress: Stress Concern Present    Feeling of Stress : To some extent   Social Connections: Unknown    Frequency of Communication with Friends and Family: Three times a week    Frequency of Social Gatherings with Friends and Family: Once a week    Active Member of Clubs or Organizations: No    Attends Club or Organization Meetings: More than 4 times per year    Marital Status:    Housing Stability: Low Risk     Unable to Pay for Housing in the Last Year: No    Number of Places Lived in the Last Year: 1    Unstable Housing in the Last Year: No       Family History:   family history includes Arthritis in his maternal grandfather; Diabetes in his maternal grandfather.    Health Maintenance   Topic Date Due    TETANUS VACCINE  03/08/2027    Hepatitis C Screening  Completed    Lipid Panel  Completed       Physical Exam:    Vital Signs  Temp: 98.6 °F (37 °C)  Pulse: 77  SpO2: 97 %  BP: 118/86  Pain Score:   6  Pain Loc: Arm  Height and Weight  Height: 6' (182.9 cm)  Weight: 129.3 kg (285 lb 0.9 oz)  BSA (Calculated - sq m): 2.56 sq meters  BMI (Calculated): 38.7  Weight in (lb) to have BMI = 25: 183.9]    Body mass index is 38.66 kg/m².    Physical Exam  Vitals and nursing note reviewed.   Constitutional:       Appearance: Normal appearance. He is obese.   HENT:      Head: Normocephalic and atraumatic.       Right Ear: Tympanic membrane normal.      Left Ear: Tympanic membrane normal.   Eyes:      Extraocular Movements: Extraocular movements intact.      Pupils: Pupils are equal, round, and reactive to light.   Cardiovascular:      Rate and Rhythm: Normal rate and regular rhythm.      Pulses: Normal pulses.      Heart sounds: Normal heart sounds. No murmur heard.    No gallop.   Pulmonary:      Effort: Pulmonary effort is normal. No respiratory distress.      Breath sounds: Normal breath sounds. No wheezing, rhonchi or rales.   Abdominal:      General: There is no distension.      Palpations: Abdomen is soft.      Tenderness: There is no abdominal tenderness.   Musculoskeletal:         General: No swelling, deformity or signs of injury. Normal range of motion.      Left upper arm: Tenderness present.        Arms:       Cervical back: Normal range of motion.      Comments: Decreased strength of elbow flexion and tenderness as shown   Skin:     General: Skin is warm and dry.      Capillary Refill: Capillary refill takes less than 2 seconds.      Coloration: Skin is not jaundiced or pale.   Neurological:      General: No focal deficit present.      Mental Status: He is alert and oriented to person, place, and time.   Psychiatric:         Mood and Affect: Mood normal.         Behavior: Behavior normal.           Assessment:      ICD-10-CM ICD-9-CM   1. Rupture of left distal biceps tendon, initial encounter  S46.212A 840.8         Plan:       Refer to orthopedics for rupture of left distal biceps tendon.   Recommend 7.5 mg Mobic. Take with food.         Orders Placed This Encounter   Procedures    Ambulatory referral/consult to Orthopedics       Current Outpatient Medications   Medication Sig Dispense Refill    fluticasone propionate (FLONASE) 50 mcg/actuation nasal spray 1 spray (50 mcg total) by Each Nostril route once daily. 16 mL 11    levocetirizine (XYZAL) 5 MG tablet TAKE 1 TABLET(5 MG) BY MOUTH EVERY EVENING  90 tablet 3    sertraline (ZOLOFT) 50 MG tablet Take 1 tablet (50 mg total) by mouth once daily. 30 tablet 0    traZODone (DESYREL) 50 MG tablet Take 1 tablet (50 mg total) by mouth every evening for 14 days, THEN 2 tablets (100 mg total) every evening. 166 tablet 0    azelastine (ASTELIN) 137 mcg (0.1 %) nasal spray 1 spray (137 mcg total) by Nasal route 2 (two) times daily. 30 mL 0    icosapent ethyL (VASCEPA) 1 gram Cap Take 2 capsules (2 g total) by mouth 2 (two) times daily. 360 capsule 3    meloxicam (MOBIC) 7.5 MG tablet Take 1 tablet (7.5 mg total) by mouth once daily. 30 tablet 0    nicotine (NICODERM CQ) 21 mg/24 hr Place 1 patch onto the skin once daily. 14 patch 0    nicotine polacrilex 4 MG Lozg Take 1 lozenge (4 mg total) by mouth as needed (Use in place of cigarette). 270 lozenge 0    promethazine-dextromethorphan (PROMETHAZINE-DM) 6.25-15 mg/5 mL Syrp Take 5 mLs by mouth every evening. (Patient not taking: Reported on 4/7/2022) 118 mL 0     No current facility-administered medications for this visit.       There are no discontinued medications.    No follow-ups on file.      Ivonne Pineda MD  Scribe Attestation:   I, Dee Nicholson, am scribing for, and in the presence of, Dr.Arif Pineda I performed the above scribed service and the documentation accurately describes the services I performed. I attest to the accuracy of the note.    I, Dr. Ivonne Pineda, reviewed documentation as scribed above. I performed the services described in this documentation.  I agree that the record reflects my personal performance and is accurate and complete. Ivonne Pineda MD.  10/04/2021

## 2022-04-14 ENCOUNTER — TELEPHONE (OUTPATIENT)
Dept: ORTHOPEDICS | Facility: CLINIC | Age: 37
End: 2022-04-14
Payer: COMMERCIAL

## 2022-06-20 ENCOUNTER — OFFICE VISIT (OUTPATIENT)
Dept: PAIN MEDICINE | Facility: CLINIC | Age: 37
End: 2022-06-20
Payer: COMMERCIAL

## 2022-06-20 DIAGNOSIS — S39.012A STRAIN OF LUMBAR PARASPINOUS MUSCLE, INITIAL ENCOUNTER: ICD-10-CM

## 2022-06-20 DIAGNOSIS — M54.50 ACUTE EXACERBATION OF CHRONIC LOW BACK PAIN: Primary | ICD-10-CM

## 2022-06-20 DIAGNOSIS — G89.29 ACUTE EXACERBATION OF CHRONIC LOW BACK PAIN: Primary | ICD-10-CM

## 2022-06-20 PROCEDURE — 99204 PR OFFICE/OUTPT VISIT, NEW, LEVL IV, 45-59 MIN: ICD-10-PCS | Mod: 95,,, | Performed by: PAIN MEDICINE

## 2022-06-20 PROCEDURE — 99204 OFFICE O/P NEW MOD 45 MIN: CPT | Mod: 95,,, | Performed by: PAIN MEDICINE

## 2022-06-20 RX ORDER — TIZANIDINE 4 MG/1
4 TABLET ORAL 3 TIMES DAILY PRN
Qty: 30 TABLET | Refills: 0 | Status: SHIPPED | OUTPATIENT
Start: 2022-06-20 | End: 2022-06-30

## 2022-06-20 RX ORDER — DICLOFENAC SODIUM 75 MG/1
75 TABLET, DELAYED RELEASE ORAL 2 TIMES DAILY
Qty: 40 TABLET | Refills: 0 | Status: SHIPPED | OUTPATIENT
Start: 2022-06-20 | End: 2022-07-10

## 2022-06-20 RX ORDER — ACETAMINOPHEN 500 MG
1000 TABLET ORAL 3 TIMES DAILY
Refills: 0 | COMMUNITY
Start: 2022-06-20 | End: 2022-07-10

## 2022-06-20 NOTE — PROGRESS NOTES
"  [facility]    Telemedicine Encounter    Telemedicine Bundle:  This visit was completed during the Coronavirus Crisis to enhance patient safety.  The patient location is: patient's home  The chief complaint leading to consultation is: Back Pain and Hip Pain  Visit type: Virtual visit with synchronous audio and video  Total time spent with patient: 17 minutes  Each patient to whom he or she provides medical services by telemedicine is:    (1) informed of the relationship between the physician and patient and the respective role of any other health care provider with respect to management of the patient  (2) notified that he or she may decline to receive medical services by telemedicine and may withdraw from such care at any time.    Referred by: No ref. provider found   Reason for referral: * No diagnoses found *     CC:   Chief Complaint   Patient presents with    Back Pain    Hip Pain       Subjective:   Nato Ng is a 37 y.o. male who has a past medical history of Bulging lumbar disc, Fatigue, and Insomnia. He complains of pain as described below.  Patient reports having a lumbar fusion since he was last evaluated by Dr. Raman.  He reports doing ok for a while but last week he twisted in a "wrong" way.  He feels like the pain is coming from the back and going into the hip area.    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 none     Assessment & " Plan:  Problem List Items Addressed This Visit    None     Visit Diagnoses     Acute exacerbation of chronic low back pain    -  Primary    Relevant Medications    tiZANidine (ZANAFLEX) 4 MG tablet    diclofenac (VOLTAREN) 75 MG EC tablet    acetaminophen (TYLENOL) 500 MG tablet    Other Relevant Orders    Ambulatory referral/consult to Physical/Occupational Therapy    Strain of lumbar paraspinous muscle, initial encounter        Relevant Medications    tiZANidine (ZANAFLEX) 4 MG tablet    diclofenac (VOLTAREN) 75 MG EC tablet    acetaminophen (TYLENOL) 500 MG tablet    Other Relevant Orders    Ambulatory referral/consult to Physical/Occupational Therapy        6/20/22 - Nato Ng is a 37 y.o. male who  has a past medical history of Bulging lumbar disc, Fatigue (4/6/2018), and Insomnia (4/6/2018).  He presents today complaining of acute onset low back pain that began 1 week ago when bending over to lift a heavy patient from the ground (he is a ).  Pain is been persistent and radiates down the posterior aspect of the left buttock and thigh, not passing the knee.  His history of degenerative spine disease includes an anterior disc replacement at L5-S1 in 2019, but there is no posterior hardware by his description.  More likely than not, he is suffering from a paraspinal muscle strain.  We will start with conservative therapy in the form of medication management and physical therapy.  We also discussed appropriate bending/lifting technique as well as long-term secondary prevention with core strengthening exercises (glute bridge, planking).    1. Medication management for acute pain  a. Tizanidine 4 mg b.i.d. p.r.n. muscle spasm  b. Voltaren 75 mg tablet b.i.d.  c. Tylenol 1000 mg t.i.d.  2. Referral to physical therapy  3. Consider trigger point injection    Follow Up:     Disclaimer: This note was partly generated using dictation software which may occasionally result in transcription  errors.    Imaging:  Previous MRI reviewed showing DDD @ L5-S1.  Imaging pre-dates disc replacement.    Review of Systems:  Review of Systems   Constitutional: Negative for chills and fever.   HENT: Negative for nosebleeds.    Eyes: Negative for blurred vision and pain.   Respiratory: Negative for hemoptysis.    Cardiovascular: Negative for chest pain and palpitations.   Gastrointestinal: Negative for heartburn, nausea and vomiting.   Genitourinary: Negative for dysuria and hematuria.   Musculoskeletal: Positive for back pain. Negative for myalgias.   Skin: Negative for rash.   Neurological: Negative for seizures and loss of consciousness.   Endo/Heme/Allergies: Does not bruise/bleed easily.   Psychiatric/Behavioral: Negative for hallucinations.       Physical Exam:  There were no vitals filed for this visit.  General    Constitutional: He is oriented to person, place, and time. He appears well-developed. No distress.   HENT:   Head: Atraumatic.   Nose: Nose normal.   Eyes: Conjunctivae and EOM are normal.   Pulmonary/Chest: Effort normal. No respiratory distress.   Abdominal: He exhibits no distension.   Neurological: He is alert and oriented to person, place, and time.   Psychiatric: He has a normal mood and affect.         Back (L-Spine & T-Spine) / Neck (C-Spine) Exam     Comments:  On camera, patient points to left lower back as the location of pain.  Pain is worse with flexion and not affected by extension of the lumbar spine.  Pain location and appears to be paraspinal at the level of the iliac crest in the lower portion of the lumbar spine.

## 2022-07-27 ENCOUNTER — TELEPHONE (OUTPATIENT)
Dept: SMOKING CESSATION | Facility: CLINIC | Age: 37
End: 2022-07-27
Payer: COMMERCIAL

## 2022-09-02 ENCOUNTER — OFFICE VISIT (OUTPATIENT)
Dept: INTERNAL MEDICINE | Facility: CLINIC | Age: 37
End: 2022-09-02
Payer: COMMERCIAL

## 2022-09-02 VITALS
BODY MASS INDEX: 40.58 KG/M2 | DIASTOLIC BLOOD PRESSURE: 82 MMHG | HEART RATE: 92 BPM | HEIGHT: 72 IN | SYSTOLIC BLOOD PRESSURE: 130 MMHG | TEMPERATURE: 98 F | WEIGHT: 299.63 LBS | OXYGEN SATURATION: 97 %

## 2022-09-02 DIAGNOSIS — F41.9 ANXIETY AND DEPRESSION: Primary | ICD-10-CM

## 2022-09-02 DIAGNOSIS — G47.00 INSOMNIA, UNSPECIFIED TYPE: ICD-10-CM

## 2022-09-02 DIAGNOSIS — F32.A ANXIETY AND DEPRESSION: Primary | ICD-10-CM

## 2022-09-02 DIAGNOSIS — H00.011 HORDEOLUM EXTERNUM OF RIGHT UPPER EYELID: ICD-10-CM

## 2022-09-02 DIAGNOSIS — Z98.1 HISTORY OF LUMBAR FUSION: ICD-10-CM

## 2022-09-02 DIAGNOSIS — Z28.9 DELAYED IMMUNIZATIONS: ICD-10-CM

## 2022-09-02 DIAGNOSIS — J00 ACUTE NASOPHARYNGITIS: ICD-10-CM

## 2022-09-02 PROBLEM — J06.9 UPPER RESPIRATORY TRACT INFECTION: Status: RESOLVED | Noted: 2021-08-20 | Resolved: 2022-09-02

## 2022-09-02 PROBLEM — S39.012A STRAIN OF LUMBAR REGION: Status: ACTIVE | Noted: 2022-09-02

## 2022-09-02 PROCEDURE — 99214 PR OFFICE/OUTPT VISIT, EST, LEVL IV, 30-39 MIN: ICD-10-PCS | Mod: 25,S$GLB,, | Performed by: FAMILY MEDICINE

## 2022-09-02 PROCEDURE — 3075F SYST BP GE 130 - 139MM HG: CPT | Mod: CPTII,S$GLB,, | Performed by: FAMILY MEDICINE

## 2022-09-02 PROCEDURE — 1159F PR MEDICATION LIST DOCUMENTED IN MEDICAL RECORD: ICD-10-PCS | Mod: CPTII,S$GLB,, | Performed by: FAMILY MEDICINE

## 2022-09-02 PROCEDURE — 3008F PR BODY MASS INDEX (BMI) DOCUMENTED: ICD-10-PCS | Mod: CPTII,S$GLB,, | Performed by: FAMILY MEDICINE

## 2022-09-02 PROCEDURE — 3075F PR MOST RECENT SYSTOLIC BLOOD PRESS GE 130-139MM HG: ICD-10-PCS | Mod: CPTII,S$GLB,, | Performed by: FAMILY MEDICINE

## 2022-09-02 PROCEDURE — 99999 PR PBB SHADOW E&M-EST. PATIENT-LVL V: CPT | Mod: PBBFAC,,, | Performed by: FAMILY MEDICINE

## 2022-09-02 PROCEDURE — 1159F MED LIST DOCD IN RCRD: CPT | Mod: CPTII,S$GLB,, | Performed by: FAMILY MEDICINE

## 2022-09-02 PROCEDURE — 90471 PNEUMOCOCCAL CONJUGATE VACCINE 20-VALENT: ICD-10-PCS | Mod: 59,S$GLB,, | Performed by: FAMILY MEDICINE

## 2022-09-02 PROCEDURE — 99999 PR PBB SHADOW E&M-EST. PATIENT-LVL V: ICD-10-PCS | Mod: PBBFAC,,, | Performed by: FAMILY MEDICINE

## 2022-09-02 PROCEDURE — 96372 PR INJECTION,THERAP/PROPH/DIAG2ST, IM OR SUBCUT: ICD-10-PCS | Mod: S$GLB,,, | Performed by: FAMILY MEDICINE

## 2022-09-02 PROCEDURE — 90677 PCV20 VACCINE IM: CPT | Mod: S$GLB,,, | Performed by: FAMILY MEDICINE

## 2022-09-02 PROCEDURE — 99214 OFFICE O/P EST MOD 30 MIN: CPT | Mod: 25,S$GLB,, | Performed by: FAMILY MEDICINE

## 2022-09-02 PROCEDURE — 3008F BODY MASS INDEX DOCD: CPT | Mod: CPTII,S$GLB,, | Performed by: FAMILY MEDICINE

## 2022-09-02 PROCEDURE — 3079F PR MOST RECENT DIASTOLIC BLOOD PRESSURE 80-89 MM HG: ICD-10-PCS | Mod: CPTII,S$GLB,, | Performed by: FAMILY MEDICINE

## 2022-09-02 PROCEDURE — 90677 PNEUMOCOCCAL CONJUGATE VACCINE 20-VALENT: ICD-10-PCS | Mod: S$GLB,,, | Performed by: FAMILY MEDICINE

## 2022-09-02 PROCEDURE — 3079F DIAST BP 80-89 MM HG: CPT | Mod: CPTII,S$GLB,, | Performed by: FAMILY MEDICINE

## 2022-09-02 PROCEDURE — 90471 IMMUNIZATION ADMIN: CPT | Mod: 59,S$GLB,, | Performed by: FAMILY MEDICINE

## 2022-09-02 PROCEDURE — 96372 THER/PROPH/DIAG INJ SC/IM: CPT | Mod: S$GLB,,, | Performed by: FAMILY MEDICINE

## 2022-09-02 RX ORDER — ERYTHROMYCIN 5 MG/G
OINTMENT OPHTHALMIC EVERY 8 HOURS
Qty: 3.5 G | Refills: 1 | Status: SHIPPED | OUTPATIENT
Start: 2022-09-02 | End: 2022-10-16

## 2022-09-02 RX ORDER — KETOROLAC TROMETHAMINE 30 MG/ML
60 INJECTION, SOLUTION INTRAMUSCULAR; INTRAVENOUS
Status: COMPLETED | OUTPATIENT
Start: 2022-09-02 | End: 2022-09-02

## 2022-09-02 RX ORDER — SERTRALINE HYDROCHLORIDE 50 MG/1
50 TABLET, FILM COATED ORAL DAILY
Qty: 90 TABLET | Refills: 3 | Status: SHIPPED | OUTPATIENT
Start: 2022-09-02 | End: 2023-04-03 | Stop reason: SDUPTHER

## 2022-09-02 RX ORDER — CYCLOBENZAPRINE HCL 10 MG
10 TABLET ORAL NIGHTLY
Qty: 30 TABLET | Refills: 0 | Status: SHIPPED | OUTPATIENT
Start: 2022-09-02 | End: 2023-02-15

## 2022-09-02 RX ORDER — ORPHENADRINE CITRATE 30 MG/ML
60 INJECTION INTRAMUSCULAR; INTRAVENOUS
Status: COMPLETED | OUTPATIENT
Start: 2022-09-02 | End: 2022-09-02

## 2022-09-02 RX ORDER — TRAZODONE HYDROCHLORIDE 50 MG/1
50 TABLET ORAL NIGHTLY
Qty: 90 TABLET | Refills: 3 | Status: SHIPPED | OUTPATIENT
Start: 2022-09-02 | End: 2023-01-06

## 2022-09-02 RX ORDER — MELOXICAM 15 MG/1
15 TABLET ORAL DAILY
Qty: 30 TABLET | Refills: 0 | Status: SHIPPED | OUTPATIENT
Start: 2022-09-02 | End: 2022-09-02

## 2022-09-02 RX ORDER — LEVOCETIRIZINE DIHYDROCHLORIDE 5 MG/1
5 TABLET, FILM COATED ORAL NIGHTLY
Qty: 90 TABLET | Refills: 3 | Status: SHIPPED | OUTPATIENT
Start: 2022-09-02

## 2022-09-02 RX ADMIN — ORPHENADRINE CITRATE 60 MG: 30 INJECTION INTRAMUSCULAR; INTRAVENOUS at 03:09

## 2022-09-02 RX ADMIN — KETOROLAC TROMETHAMINE 60 MG: 30 INJECTION, SOLUTION INTRAMUSCULAR; INTRAVENOUS at 03:09

## 2022-09-02 NOTE — PROGRESS NOTES
Subjective:       Patient ID: Nato Ng is a 37 y.o. male.    Chief Complaint: Back Pain and Medication Refill    Back Pain  This is a recurrent problem. The current episode started more than 1 year ago. The problem occurs constantly. The problem has been gradually worsening since onset. The pain is present in the lumbar spine. The quality of the pain is described as aching. The pain is moderate. The symptoms are aggravated by bending, sitting, position and standing.   Anxiety  Presents for follow-up visit. Symptoms include nervous/anxious behavior. Patient reports no confusion, depressed mood, palpitations or suicidal ideas. Symptoms occur constantly. The severity of symptoms is mild.       Review of Systems   Constitutional:  Positive for activity change and unexpected weight change.   HENT:  Negative for hearing loss, rhinorrhea and trouble swallowing.    Eyes:  Negative for discharge and visual disturbance.   Respiratory:  Negative for chest tightness and wheezing.    Cardiovascular:  Negative for palpitations.   Gastrointestinal:  Negative for blood in stool, constipation and diarrhea.   Endocrine: Negative for polydipsia and polyuria.   Genitourinary:  Negative for difficulty urinating, hematuria and urgency.   Musculoskeletal:  Positive for back pain.   Psychiatric/Behavioral:  Positive for sleep disturbance. Negative for confusion, dysphoric mood and suicidal ideas. The patient is nervous/anxious.      Objective:      Physical Exam  Vitals and nursing note reviewed.   Constitutional:       General: He is not in acute distress.     Appearance: Normal appearance. He is well-developed. He is not diaphoretic.   HENT:      Head: Normocephalic and atraumatic.   Eyes:      Comments: Erythema to right upper eyelid   Pulmonary:      Effort: Pulmonary effort is normal. No respiratory distress.      Breath sounds: Normal breath sounds. No wheezing.   Skin:     General: Skin is warm and dry.      Findings: No  erythema or rash.   Neurological:      Mental Status: He is alert.       Assessment:       1. Anxiety and depression    2. History of lumbar fusion    3. Delayed immunizations    4. Acute nasopharyngitis    5. Hordeolum externum of right upper eyelid    6. Insomnia, unspecified type          Plan:     Problem List Items Addressed This Visit          Neuro    History of lumbar fusion    Current Assessment & Plan     Toradol injection. Do not take aspirin or nsaids until 48 hrs after injection.    Norflex injection. Do not take flexeril until 48 hrs after injection.           Relevant Medications    meloxicam (MOBIC) 15 MG tablet    cyclobenzaprine (FLEXERIL) 10 MG tablet    ketorolac injection 60 mg (Start on 9/2/2022  3:00 PM)    orphenadrine injection 60 mg (Start on 9/2/2022  3:00 PM)       Psychiatric    Anxiety and depression - Primary    Relevant Medications    sertraline (ZOLOFT) 50 MG tablet       Ophtho    Hordeolum externum of right upper eyelid    Relevant Medications    erythromycin (ROMYCIN) ophthalmic ointment       ENT    RESOLVED: Upper respiratory tract infection    Relevant Medications    levocetirizine (XYZAL) 5 MG tablet       ID    Delayed immunizations    Relevant Orders    Pneumococcal Conjugate Vaccine (20 Valent) (IM)       Other    Insomnia    Relevant Medications    traZODone (DESYREL) 50 MG tablet

## 2022-09-23 ENCOUNTER — PATIENT MESSAGE (OUTPATIENT)
Dept: INTERNAL MEDICINE | Facility: CLINIC | Age: 37
End: 2022-09-23
Payer: COMMERCIAL

## 2022-10-10 NOTE — PROGRESS NOTES
"Nutrition Assessment  Session Time:        Client name:  Nato Ng  :  1985  Age:  37 y.o.  Gender:  male    Client states:  [employer] [facility] [employer] employee present for [email or web address] [facility] physical. Last physical and nutrition consultation was in 2021.    Continues with goal of wanting to lose weight.  Reports weight is continuing to increase.  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   Recently began taking medication to help with sleep but has not noticed any improvement.  Plans to discuss with physician today.    Reports home food choices are fine. Typically consumes meals consisting of meat + vegetables.  Due to working at a busier station, oftentimes finds self picking up choices from fast food places or sweets and other "junk" food items from gas stations.     Drinks: water, 1 coke once a week , sweet tea when dining out which is rarely, coffee with unsweetened creamer     Exercise: little to none         2021: Client states:  [employer] employee present for annual physical with [email or web address] [facility]. Was last seen 2020.  Quit smoking 1 month. Continues with smoking cessation program.   Continues with goal of wanting to lose weight, but not working towards achieving goal. Unable to exercise due to previous back surgery. Repots being able to work towards weight loss if he was able to exercise.       Anthropometrics  Height:  72"     Weight:  301.8 lbs    2021: 273 lbs   2020: 264 lbs  BMI:  41   2021: 37.08  % Body Fat:  n/a    Clinical Signs/Symptoms  N/V/D:  none reported  Appetite:  good       Past Medical History:   Diagnosis Date    Bulging lumbar disc     Fatigue 2018    Insomnia 2018    Upper respiratory tract infection 2021       Past Surgical History:   Procedure Laterality Date    FRACTURE SURGERY      LEG SURGERY      SPINAL FUSION  2019       Medications    has a current medication list " which includes the following prescription(s): erythromycin, fluticasone propionate, levocetirizine, meloxicam, promethazine-dextromethorphan, sertraline, and trazodone.    Vitamins, Minerals, and/or Supplements:  not discussed     Food/Medication Interactions:  Reviewed     Food Allergies or Intolerances:  NKFA     Social History    Marital status:    Employment:  [employer]    Social History     Tobacco Use    Smoking status: Every Day     Packs/day: 0.50     Years: 3.00     Pack years: 1.50     Types: Cigarettes     Start date: 2009    Smokeless tobacco: Current     Types: Snuff    Tobacco comments:     currently quitting   Substance Use Topics    Alcohol use: No        Lab Reports   Sodium   Date Value Ref Range Status   08/25/2021 140 136 - 145 mmol/L Final     Potassium   Date Value Ref Range Status   08/25/2021 4.2 3.5 - 5.1 mmol/L Final     Chloride   Date Value Ref Range Status   08/25/2021 102 95 - 110 mmol/L Final     CO2   Date Value Ref Range Status   08/25/2021 27 23 - 29 mmol/L Final     Glucose   Date Value Ref Range Status   08/25/2021 93 70 - 110 mg/dL Final     BUN   Date Value Ref Range Status   08/25/2021 14 6 - 20 mg/dL Final     Creatinine   Date Value Ref Range Status   08/25/2021 1.0 0.5 - 1.4 mg/dL Final     Calcium   Date Value Ref Range Status   08/25/2021 9.6 8.7 - 10.5 mg/dL Final     Total Protein   Date Value Ref Range Status   08/25/2021 7.7 6.0 - 8.4 g/dL Final     Albumin   Date Value Ref Range Status   08/25/2021 3.8 3.5 - 5.2 g/dL Final     Total Bilirubin   Date Value Ref Range Status   08/25/2021 1.3 (H) 0.1 - 1.0 mg/dL Final     Comment:     For infants and newborns, interpretation of results should be based  on gestational age, weight and in agreement with clinical  observations.    Premature Infant recommended reference ranges:  Up to 24 hours.............<8.0 mg/dL  Up to 48 hours............<12.0 mg/dL  3-5 days..................<15.0 mg/dL  6-29 days.................<15.0  mg/dL       Alkaline Phosphatase   Date Value Ref Range Status   08/25/2021 98 55 - 135 U/L Final     AST   Date Value Ref Range Status   08/25/2021 14 10 - 40 U/L Final     ALT   Date Value Ref Range Status   08/25/2021 29 10 - 44 U/L Final     Anion Gap   Date Value Ref Range Status   08/25/2021 11 8 - 16 mmol/L Final     eGFR if    Date Value Ref Range Status   08/25/2021 >60 >60 mL/min/1.73 m^2 Final     eGFR if non    Date Value Ref Range Status   08/25/2021 >60 >60 mL/min/1.73 m^2 Final     Comment:     Calculation used to obtain the estimated glomerular filtration  rate (eGFR) is the CKD-EPI equation.         Lab Results   Component Value Date    WBC 11.75 08/25/2021    HGB 16.6 08/25/2021    HCT 48.4 08/25/2021    MCV 84 08/25/2021     08/25/2021        Lab Results   Component Value Date    CHOL 190 08/25/2021     Lab Results   Component Value Date    HDL 37 (L) 08/25/2021     Lab Results   Component Value Date    LDLCALC 79.6 08/25/2021     Lab Results   Component Value Date    TRIG 367 (H) 08/25/2021     Lab Results   Component Value Date    CHOLHDL 19.5 (L) 08/25/2021     Lab Results   Component Value Date    HGBA1C 5.6 08/25/2021     BP Readings from Last 1 Encounters:   09/02/22 130/82       Food History  No recall.     Exercise History:  little/none    Cultural/Spiritual/Personal Preferences:  None identified    Support System:  spouse    State of Change:  Precontemplation    Barriers to Change:  lack of sleep, previous back injury    Diagnosis    Other: obesity  related to excess energy intake as evidenced by BMI 41.    Intervention    RMR (Method:  [place] . [place]):  2346 kcal  Activity Factor:   1.2     MARK:  2815 - 500 = 2315 kcal    Goals:  1.  Begin an exercise routine.   2.  Choose healthier options when dining out or getting food from gas stations.   3.  Gradually reduce portions of sweets in diet.    Nutrition Education  The following education was  provided to the patient:  Discussed weight management.  Suggested dietary modifications based on current dietary behaviors and individual food preferences.  Discussed physical activity guidelines and its associated benefits.  Provided ongoing support, encouragement, and guidance toward improved health efforts.    Patient verbalized understanding of nutrition education and recommendations received.    Handouts Provided  none    Monitoring/Evaluation    Monitor the following:  Weight  BMI  Caloric intake  Labs:  lipid panel, glucose, A1c    Follow Up Plan:  Communication with referring healthcare provider is unnecessary at this time as patient presented as part of annual wellness exam.  However, will follow up with patient in 1-2 years.

## 2022-10-11 ENCOUNTER — CLINICAL SUPPORT (OUTPATIENT)
Dept: AUDIOLOGY | Facility: CLINIC | Age: 37
End: 2022-10-11
Payer: COMMERCIAL

## 2022-10-11 ENCOUNTER — OFFICE VISIT (OUTPATIENT)
Dept: INTERNAL MEDICINE | Facility: CLINIC | Age: 37
End: 2022-10-11
Payer: COMMERCIAL

## 2022-10-11 ENCOUNTER — CLINICAL SUPPORT (OUTPATIENT)
Dept: INTERNAL MEDICINE | Facility: CLINIC | Age: 37
End: 2022-10-11
Payer: COMMERCIAL

## 2022-10-11 VITALS
SYSTOLIC BLOOD PRESSURE: 130 MMHG | HEIGHT: 72 IN | TEMPERATURE: 98 F | WEIGHT: 299.63 LBS | OXYGEN SATURATION: 96 % | HEART RATE: 92 BPM | DIASTOLIC BLOOD PRESSURE: 82 MMHG | BODY MASS INDEX: 40.58 KG/M2

## 2022-10-11 DIAGNOSIS — Z00.00 ROUTINE GENERAL MEDICAL EXAMINATION AT A HEALTH CARE FACILITY: Primary | ICD-10-CM

## 2022-10-11 DIAGNOSIS — E78.1 HYPERTRIGLYCERIDEMIA: ICD-10-CM

## 2022-10-11 DIAGNOSIS — Z71.3 DIETARY COUNSELING: Primary | ICD-10-CM

## 2022-10-11 DIAGNOSIS — F41.9 ANXIETY AND DEPRESSION: ICD-10-CM

## 2022-10-11 DIAGNOSIS — Z01.12 HEARING CONSERVATION AND TREATMENT EXAM: Primary | ICD-10-CM

## 2022-10-11 DIAGNOSIS — F32.A ANXIETY AND DEPRESSION: ICD-10-CM

## 2022-10-11 LAB
ALBUMIN SERPL BCP-MCNC: 4.1 G/DL (ref 3.5–5.2)
ALP SERPL-CCNC: 111 U/L (ref 55–135)
ALT SERPL W/O P-5'-P-CCNC: 63 U/L (ref 10–44)
ANION GAP SERPL CALC-SCNC: 15 MMOL/L (ref 8–16)
AST SERPL-CCNC: 29 U/L (ref 10–40)
BILIRUB SERPL-MCNC: 1.1 MG/DL (ref 0.1–1)
BUN SERPL-MCNC: 11 MG/DL (ref 6–20)
CALCIUM SERPL-MCNC: 9.6 MG/DL (ref 8.7–10.5)
CHLORIDE SERPL-SCNC: 100 MMOL/L (ref 95–110)
CHOLEST SERPL-MCNC: 192 MG/DL (ref 120–199)
CHOLEST/HDLC SERPL: 5.5 {RATIO} (ref 2–5)
CO2 SERPL-SCNC: 23 MMOL/L (ref 23–29)
CREAT SERPL-MCNC: 1.1 MG/DL (ref 0.5–1.4)
ERYTHROCYTE [DISTWIDTH] IN BLOOD BY AUTOMATED COUNT: 13.9 % (ref 11.5–14.5)
EST. GFR  (NO RACE VARIABLE): >60 ML/MIN/1.73 M^2
ESTIMATED AVG GLUCOSE: 120 MG/DL (ref 68–131)
GLUCOSE SERPL-MCNC: 92 MG/DL (ref 70–110)
HBA1C MFR BLD: 5.8 % (ref 4–5.6)
HCT VFR BLD AUTO: 47.5 % (ref 40–54)
HDLC SERPL-MCNC: 35 MG/DL (ref 40–75)
HDLC SERPL: 18.2 % (ref 20–50)
HGB BLD-MCNC: 15.7 G/DL (ref 14–18)
LDLC SERPL CALC-MCNC: 95.8 MG/DL (ref 63–159)
MCH RBC QN AUTO: 27.5 PG (ref 27–31)
MCHC RBC AUTO-ENTMCNC: 33.1 G/DL (ref 32–36)
MCV RBC AUTO: 83 FL (ref 82–98)
NONHDLC SERPL-MCNC: 157 MG/DL
PLATELET # BLD AUTO: 317 K/UL (ref 150–450)
PMV BLD AUTO: 8.8 FL (ref 9.2–12.9)
POTASSIUM SERPL-SCNC: 4.1 MMOL/L (ref 3.5–5.1)
PROT SERPL-MCNC: 7.6 G/DL (ref 6–8.4)
RBC # BLD AUTO: 5.7 M/UL (ref 4.6–6.2)
SODIUM SERPL-SCNC: 138 MMOL/L (ref 136–145)
TRIGL SERPL-MCNC: 306 MG/DL (ref 30–150)
WBC # BLD AUTO: 8.57 K/UL (ref 3.9–12.7)

## 2022-10-11 PROCEDURE — 83036 HEMOGLOBIN GLYCOSYLATED A1C: CPT | Performed by: INTERNAL MEDICINE

## 2022-10-11 PROCEDURE — 1159F MED LIST DOCD IN RCRD: CPT | Mod: CPTII,S$GLB,, | Performed by: INTERNAL MEDICINE

## 2022-10-11 PROCEDURE — 3079F DIAST BP 80-89 MM HG: CPT | Mod: CPTII,S$GLB,, | Performed by: INTERNAL MEDICINE

## 2022-10-11 PROCEDURE — 99999 PR PBB SHADOW E&M-EST. PATIENT-LVL III: ICD-10-PCS | Mod: PBBFAC,,, | Performed by: INTERNAL MEDICINE

## 2022-10-11 PROCEDURE — 80061 LIPID PANEL: CPT | Performed by: INTERNAL MEDICINE

## 2022-10-11 PROCEDURE — 97802 MEDICAL NUTRITION INDIV IN: CPT | Mod: S$GLB,,, | Performed by: INTERNAL MEDICINE

## 2022-10-11 PROCEDURE — 83655 ASSAY OF LEAD: CPT | Performed by: INTERNAL MEDICINE

## 2022-10-11 PROCEDURE — 85027 COMPLETE CBC AUTOMATED: CPT | Performed by: INTERNAL MEDICINE

## 2022-10-11 PROCEDURE — 92552 PR PURE TONE AUDIOMETRY, AIR: ICD-10-PCS | Mod: S$GLB,,,

## 2022-10-11 PROCEDURE — 90471 IMMUNIZATION ADMIN: CPT | Mod: S$GLB,,, | Performed by: INTERNAL MEDICINE

## 2022-10-11 PROCEDURE — 3075F SYST BP GE 130 - 139MM HG: CPT | Mod: CPTII,S$GLB,, | Performed by: INTERNAL MEDICINE

## 2022-10-11 PROCEDURE — 99999 PR PBB SHADOW E&M-EST. PATIENT-LVL I: ICD-10-PCS | Mod: PBBFAC,,,

## 2022-10-11 PROCEDURE — 99395 PREV VISIT EST AGE 18-39: CPT | Mod: 25,S$GLB,, | Performed by: INTERNAL MEDICINE

## 2022-10-11 PROCEDURE — 3044F PR MOST RECENT HEMOGLOBIN A1C LEVEL <7.0%: ICD-10-PCS | Mod: CPTII,S$GLB,, | Performed by: INTERNAL MEDICINE

## 2022-10-11 PROCEDURE — 97802 PR MED NUTR THER, 1ST, INDIV, EA 15 MIN: ICD-10-PCS | Mod: S$GLB,,, | Performed by: INTERNAL MEDICINE

## 2022-10-11 PROCEDURE — 99999 PR PBB SHADOW E&M-EST. PATIENT-LVL III: CPT | Mod: PBBFAC,,, | Performed by: INTERNAL MEDICINE

## 2022-10-11 PROCEDURE — 90686 IIV4 VACC NO PRSV 0.5 ML IM: CPT | Mod: S$GLB,,, | Performed by: INTERNAL MEDICINE

## 2022-10-11 PROCEDURE — 90686 FLU VACCINE (QUAD) GREATER THAN OR EQUAL TO 3YO PRESERVATIVE FREE IM: ICD-10-PCS | Mod: S$GLB,,, | Performed by: INTERNAL MEDICINE

## 2022-10-11 PROCEDURE — 90471 FLU VACCINE (QUAD) GREATER THAN OR EQUAL TO 3YO PRESERVATIVE FREE IM: ICD-10-PCS | Mod: S$GLB,,, | Performed by: INTERNAL MEDICINE

## 2022-10-11 PROCEDURE — 80053 COMPREHEN METABOLIC PANEL: CPT | Performed by: INTERNAL MEDICINE

## 2022-10-11 PROCEDURE — 92552 PURE TONE AUDIOMETRY AIR: CPT | Mod: S$GLB,,,

## 2022-10-11 PROCEDURE — 99999 PR PBB SHADOW E&M-EST. PATIENT-LVL I: CPT | Mod: PBBFAC,,,

## 2022-10-11 PROCEDURE — 1159F PR MEDICATION LIST DOCUMENTED IN MEDICAL RECORD: ICD-10-PCS | Mod: CPTII,S$GLB,, | Performed by: INTERNAL MEDICINE

## 2022-10-11 PROCEDURE — 99395 PR PREVENTIVE VISIT,EST,18-39: ICD-10-PCS | Mod: 25,S$GLB,, | Performed by: INTERNAL MEDICINE

## 2022-10-11 PROCEDURE — 3075F PR MOST RECENT SYSTOLIC BLOOD PRESS GE 130-139MM HG: ICD-10-PCS | Mod: CPTII,S$GLB,, | Performed by: INTERNAL MEDICINE

## 2022-10-11 PROCEDURE — 3079F PR MOST RECENT DIASTOLIC BLOOD PRESSURE 80-89 MM HG: ICD-10-PCS | Mod: CPTII,S$GLB,, | Performed by: INTERNAL MEDICINE

## 2022-10-11 PROCEDURE — 3044F HG A1C LEVEL LT 7.0%: CPT | Mod: CPTII,S$GLB,, | Performed by: INTERNAL MEDICINE

## 2022-10-11 NOTE — PROGRESS NOTES
Executive Health Screening    Nato Ng was seen 10/11/2022 for an executive health hearing screen.  Results revealed normal hearing sensitivity 250-8000 Hz, bilaterally.  Otoscopy was within normal limits, bilaterally.    Recommendations:  1. Wear Hearing Protective Devices around loud noises

## 2022-10-11 NOTE — PROGRESS NOTES
Subjective:      Patient ID: Nato Ng is a 37 y.o. male.    Chief Complaint: Executive Health    HPI    It was a pleasure to see you for your Executive Health Physical on 10/11/22.   You are a 37-year-old  gentleman with a past medical history of childhood  asthma, chronic back pain, insomnia, and anxiety.     Your primary care physician is doctor Hamilton. Your current medications include, trazodone, zoloft, meloxicam, xyzal and flonase.      Your past surgical history includes right ankle  surgery.      You have an allergy to penicillin, which  resulted in a rash.      Quit smoking one year ago. Rare alcohol.   Occasionally chews tobacco.      Your family history includes a father with nonmelanoma  skin cancer and emphysema and is otherwise healthy.  Your mother is  healthy.  Your siblings are healthy.     PREVENTIVE MEDICINE:  Your tetanus, diphtheria and  whooping cough vaccine and your pneumonia vaccine are  up-to-date with [facility] as of March 8, 2017. Flu vaccine 10/11/22.   Declined covid vaccine.         Review of Systems   Constitutional:  Negative for chills and fever.   HENT:  Negative for ear pain and sore throat.    Respiratory:  Negative for cough.    Cardiovascular:  Negative for chest pain.   Gastrointestinal:  Negative for abdominal pain and blood in stool.   Genitourinary:  Negative for dysuria and hematuria.   Neurological:  Negative for seizures and syncope.   Objective:   /82 (BP Location: Right arm, Patient Position: Sitting, BP Method: Large (Manual))   Pulse 92   Temp 98.2 °F (36.8 °C) (Oral)   Ht 6' (1.829 m)   Wt 135.9 kg (299 lb 9.7 oz)   SpO2 96%   BMI 40.63 kg/m²     Physical Exam  Constitutional:       General: He is not in acute distress.     Appearance: He is well-developed.   HENT:      Head: Normocephalic and atraumatic.   Eyes:      Extraocular Movements: Extraocular movements intact.   Neck:      Thyroid: No thyromegaly.   Cardiovascular:      Rate and Rhythm:  Normal rate and regular rhythm.   Pulmonary:      Breath sounds: Normal breath sounds. No wheezing or rales.   Abdominal:      General: Bowel sounds are normal.      Palpations: Abdomen is soft.      Tenderness: There is no abdominal tenderness.   Musculoskeletal:         General: No swelling.      Cervical back: Neck supple. No rigidity.   Lymphadenopathy:      Cervical: No cervical adenopathy.   Skin:     General: Skin is warm and dry.   Neurological:      Mental Status: He is alert and oriented to person, place, and time.   Psychiatric:         Behavior: Behavior normal.       Executive Health Screening     Nato Ng was seen 10/11/2022 for an executive health hearing screen.  Results revealed normal hearing sensitivity 250-8000 Hz, bilaterally.  Otoscopy was within normal limits, bilaterally.     Recommendations:  1. Wear Hearing Protective Devices around loud noises       Assessment:     1. Routine general medical examination at a health care facility    2. Anxiety and depression    3. Hypertriglyceridemia      Plan:   Routine general medical examination at a health care facility  Heart healthy diet, regular exercise, and regular use of sunscreen.    reviewed    Anxiety and depression    Hypertriglyceridemia    Other orders  -     Influenza - Quadrivalent *Preferred* (6 months+) (PF)      Lab Frequency Next Occurrence   COVID-19 Routine Screening Once 08/20/2021   Ambulatory referral/consult to Orthopedics Once 04/14/2022   Ambulatory referral/consult to Physical/Occupational Therapy Once 06/27/2022       Problem List Items Addressed This Visit       Anxiety and depression    Hypertriglyceridemia     Other Visit Diagnoses       Routine general medical examination at a health care facility    -  Primary            No follow-ups on file.

## 2022-10-13 LAB
LEAD BLD-MCNC: <1 MCG/DL
SPECIMEN SOURCE: NORMAL
STATE OF RESIDENCE: NORMAL

## 2022-10-16 NOTE — LETTER
October 16, 2022    Nato Ng  [address] [facility] [place] [address]             [facility] [address]  [address] [facility] [address]  Phone: [phone]  Fax: [phone] Dear Mr. Ng:    It was a pleasure to see you for your Executive Health Physical on 10/11/22.   You are a 37-year-old  gentleman with a past medical history of childhood  asthma, chronic back pain, insomnia, and anxiety.     Your primary care physician is doctor Hamilton. Your current medications include, trazodone, zoloft, meloxicam, xyzal and flonase.      Your past surgical history includes right ankle  surgery.       You have an allergy to penicillin, which  resulted in a rash.       Quit smoking one year ago. Rare alcohol.   Occasionally chews tobacco.      Your family history includes a father with nonmelanoma  skin cancer and emphysema and is otherwise healthy.  Your mother is  healthy.  Your siblings are healthy.     PREVENTIVE MEDICINE:  Your tetanus, diphtheria and  whooping cough vaccine and your pneumonia vaccine are  up-to-date with [facility] as of March 8, 2017. Flu vaccine 10/11/22.   Declined covid vaccine.        Vital Signs:  /82 (BP Location: Right arm, Patient Position: Sitting, BP Method: Large (Manual))   Pulse 92   Temp 98.2 °F (36.8 °C) (Oral)   Ht 6' (1.829 m)   Wt 135.9 kg (299 lb 9.7 oz)   SpO2 96%   BMI 40.63 kg/m²     Your physical exam was normal.     Your hemoglobin A1C of 5.8 is consistent with prediabetes. The remainder of your lab results were all within acceptable ranges.   Your audiology exam was normal.     Again I would like to thank you for allowing me to participate in your executive health physical.  At this time it appears that you are in good overall health.  It is recommended that you maintain a heart healthy diet, regular exercise, and regular use of sunscreen along with regular checkups.      As mentioned above your hemoglobin A1C is consistent with  prediabetes. The mainstay of treatment for pre diabetes is a diet low in sugars and simple carbohydrates along with exercise and weight loss.       Please do not hesitate to contact me if you have any questions or concerns or if I can be of further assistance.           Sincerely,      Tru Tapia MD

## 2022-10-17 NOTE — PROGRESS NOTES
Subjective:      Patient ID: Nato Ng is a 37 y.o. male.    Chief Complaint: No chief complaint on file.    HPI  Review of Systems  Objective:   There were no vitals taken for this visit.    Physical Exam    Assessment:     No diagnosis found.  Plan:   There are no diagnoses linked to this encounter.    Lab Frequency Next Occurrence   COVID-19 Routine Screening Once 08/20/2021   Ambulatory referral/consult to Orthopedics Once 04/14/2022   Ambulatory referral/consult to Physical/Occupational Therapy Once 06/27/2022       Problem List Items Addressed This Visit    None      No follow-ups on file.

## 2023-01-06 ENCOUNTER — OFFICE VISIT (OUTPATIENT)
Dept: INTERNAL MEDICINE | Facility: CLINIC | Age: 38
End: 2023-01-06
Payer: COMMERCIAL

## 2023-01-06 VITALS
SYSTOLIC BLOOD PRESSURE: 132 MMHG | RESPIRATION RATE: 20 BRPM | WEIGHT: 306.25 LBS | HEART RATE: 86 BPM | OXYGEN SATURATION: 98 % | BODY MASS INDEX: 41.53 KG/M2 | DIASTOLIC BLOOD PRESSURE: 90 MMHG | TEMPERATURE: 98 F

## 2023-01-06 DIAGNOSIS — F32.A ANXIETY AND DEPRESSION: ICD-10-CM

## 2023-01-06 DIAGNOSIS — E78.1 HYPERTRIGLYCERIDEMIA: ICD-10-CM

## 2023-01-06 DIAGNOSIS — G47.00 INSOMNIA, UNSPECIFIED TYPE: ICD-10-CM

## 2023-01-06 DIAGNOSIS — J06.9 UPPER RESPIRATORY TRACT INFECTION, UNSPECIFIED TYPE: Primary | ICD-10-CM

## 2023-01-06 DIAGNOSIS — E66.9 OBESITY (BMI 30.0-34.9): ICD-10-CM

## 2023-01-06 DIAGNOSIS — R73.03 PRE-DIABETES: ICD-10-CM

## 2023-01-06 DIAGNOSIS — I10 HYPERTENSION, UNSPECIFIED TYPE: ICD-10-CM

## 2023-01-06 DIAGNOSIS — F41.9 ANXIETY AND DEPRESSION: ICD-10-CM

## 2023-01-06 PROCEDURE — 1159F MED LIST DOCD IN RCRD: CPT | Mod: CPTII,S$GLB,, | Performed by: FAMILY MEDICINE

## 2023-01-06 PROCEDURE — 1160F RVW MEDS BY RX/DR IN RCRD: CPT | Mod: CPTII,S$GLB,, | Performed by: FAMILY MEDICINE

## 2023-01-06 PROCEDURE — 3008F BODY MASS INDEX DOCD: CPT | Mod: CPTII,S$GLB,, | Performed by: FAMILY MEDICINE

## 2023-01-06 PROCEDURE — 99214 OFFICE O/P EST MOD 30 MIN: CPT | Mod: 25,S$GLB,, | Performed by: FAMILY MEDICINE

## 2023-01-06 PROCEDURE — 3080F DIAST BP >= 90 MM HG: CPT | Mod: CPTII,S$GLB,, | Performed by: FAMILY MEDICINE

## 2023-01-06 PROCEDURE — 99214 PR OFFICE/OUTPT VISIT, EST, LEVL IV, 30-39 MIN: ICD-10-PCS | Mod: 25,S$GLB,, | Performed by: FAMILY MEDICINE

## 2023-01-06 PROCEDURE — 1160F PR REVIEW ALL MEDS BY PRESCRIBER/CLIN PHARMACIST DOCUMENTED: ICD-10-PCS | Mod: CPTII,S$GLB,, | Performed by: FAMILY MEDICINE

## 2023-01-06 PROCEDURE — 3075F SYST BP GE 130 - 139MM HG: CPT | Mod: CPTII,S$GLB,, | Performed by: FAMILY MEDICINE

## 2023-01-06 PROCEDURE — 99999 PR PBB SHADOW E&M-EST. PATIENT-LVL III: ICD-10-PCS | Mod: PBBFAC,,, | Performed by: FAMILY MEDICINE

## 2023-01-06 PROCEDURE — 3075F PR MOST RECENT SYSTOLIC BLOOD PRESS GE 130-139MM HG: ICD-10-PCS | Mod: CPTII,S$GLB,, | Performed by: FAMILY MEDICINE

## 2023-01-06 PROCEDURE — 96372 PR INJECTION,THERAP/PROPH/DIAG2ST, IM OR SUBCUT: ICD-10-PCS | Mod: S$GLB,,, | Performed by: FAMILY MEDICINE

## 2023-01-06 PROCEDURE — 3080F PR MOST RECENT DIASTOLIC BLOOD PRESSURE >= 90 MM HG: ICD-10-PCS | Mod: CPTII,S$GLB,, | Performed by: FAMILY MEDICINE

## 2023-01-06 PROCEDURE — 3008F PR BODY MASS INDEX (BMI) DOCUMENTED: ICD-10-PCS | Mod: CPTII,S$GLB,, | Performed by: FAMILY MEDICINE

## 2023-01-06 PROCEDURE — 96372 THER/PROPH/DIAG INJ SC/IM: CPT | Mod: S$GLB,,, | Performed by: FAMILY MEDICINE

## 2023-01-06 PROCEDURE — 1159F PR MEDICATION LIST DOCUMENTED IN MEDICAL RECORD: ICD-10-PCS | Mod: CPTII,S$GLB,, | Performed by: FAMILY MEDICINE

## 2023-01-06 PROCEDURE — 99999 PR PBB SHADOW E&M-EST. PATIENT-LVL III: CPT | Mod: PBBFAC,,, | Performed by: FAMILY MEDICINE

## 2023-01-06 RX ORDER — ICOSAPENT ETHYL 1000 MG/1
2 CAPSULE ORAL 2 TIMES DAILY
Qty: 360 CAPSULE | Refills: 1 | Status: SHIPPED | OUTPATIENT
Start: 2023-01-06 | End: 2023-03-15

## 2023-01-06 RX ORDER — SEMAGLUTIDE 1.34 MG/ML
INJECTION, SOLUTION SUBCUTANEOUS
Qty: 1 PEN | Refills: 0 | Status: SHIPPED | OUTPATIENT
Start: 2023-01-06 | End: 2023-01-27 | Stop reason: SDUPTHER

## 2023-01-06 RX ORDER — TRAZODONE HYDROCHLORIDE 100 MG/1
100 TABLET ORAL NIGHTLY
Qty: 90 TABLET | Refills: 1 | Status: SHIPPED | OUTPATIENT
Start: 2023-01-06 | End: 2023-03-15

## 2023-01-06 RX ORDER — LOSARTAN POTASSIUM 50 MG/1
50 TABLET ORAL DAILY
Qty: 90 TABLET | Refills: 1 | Status: SHIPPED | OUTPATIENT
Start: 2023-01-06 | End: 2023-03-15

## 2023-01-06 RX ORDER — BETAMETHASONE SODIUM PHOSPHATE AND BETAMETHASONE ACETATE 3; 3 MG/ML; MG/ML
6 INJECTION, SUSPENSION INTRA-ARTICULAR; INTRALESIONAL; INTRAMUSCULAR; SOFT TISSUE
Status: COMPLETED | OUTPATIENT
Start: 2023-01-06 | End: 2023-01-06

## 2023-01-06 RX ADMIN — BETAMETHASONE SODIUM PHOSPHATE AND BETAMETHASONE ACETATE 6 MG: 3; 3 INJECTION, SUSPENSION INTRA-ARTICULAR; INTRALESIONAL; INTRAMUSCULAR; SOFT TISSUE at 08:01

## 2023-01-06 NOTE — PROGRESS NOTES
Subjective:       Patient ID: Nato Ng is a 37 y.o. male.    Chief Complaint: No chief complaint on file.    URI   This is a new problem. The current episode started in the past 7 days. The problem has been gradually worsening. There has been no fever. Pertinent negatives include no chest pain, diarrhea, headaches, neck pain, rhinorrhea, vomiting or wheezing. He has tried nothing for the symptoms.   Review of Systems   Constitutional:  Positive for activity change and unexpected weight change.   HENT:  Negative for hearing loss, rhinorrhea and trouble swallowing.    Eyes:  Negative for discharge and visual disturbance.   Respiratory:  Negative for chest tightness and wheezing.    Cardiovascular:  Negative for chest pain and palpitations.   Gastrointestinal:  Negative for blood in stool, constipation, diarrhea and vomiting.   Endocrine: Negative for polydipsia and polyuria.   Genitourinary:  Negative for difficulty urinating, hematuria and urgency.   Musculoskeletal:  Negative for arthralgias, joint swelling and neck pain.   Neurological:  Negative for weakness and headaches.   Psychiatric/Behavioral:  Negative for confusion and dysphoric mood.      Objective:      Physical Exam  Vitals and nursing note reviewed.   Constitutional:       General: He is not in acute distress.     Appearance: Normal appearance. He is well-developed. He is obese. He is not diaphoretic.   HENT:      Head: Normocephalic and atraumatic.   Pulmonary:      Effort: Pulmonary effort is normal. No respiratory distress.      Breath sounds: Normal breath sounds. No wheezing.   Skin:     General: Skin is warm and dry.      Findings: No erythema or rash.   Neurological:      Mental Status: He is alert.       Assessment:       1. Upper respiratory tract infection, unspecified type    2. Anxiety and depression    3. Insomnia, unspecified type    4. Obesity (BMI 30.0-34.9)    5. Hypertriglyceridemia    6. Pre-diabetes    7. Hypertension,  unspecified type          Plan:     Problem List Items Addressed This Visit          Psychiatric    Anxiety and depression    Overview     Chronic, Stable, cont zoloft            ENT    Upper respiratory tract infection - Primary    Relevant Medications    betamethasone acetate-betamethasone sodium phosphate injection 6 mg       Cardiac/Vascular    Hypertriglyceridemia    Relevant Medications    icosapent ethyL (VASCEPA) 1 gram Cap    Hypertension    Relevant Medications    losartan (COZAAR) 50 MG tablet       Endocrine    Obesity (BMI 30.0-34.9)    Current Assessment & Plan      Discussed with pt if they have a lump or swelling in your neck, hoarseness, trouble swallowing, or shortness of breath, that These may be symptoms of thyroid cancer. In studies with rodents, Ozempic® and medicines that work like Ozempic® caused thyroid tumors, including thyroid cancer. It is not known if Ozempic® will cause thyroid tumors or a type of thyroid cancer called medullary thyroid carcinoma (MTC) in people.  Discussed with pt that if they have a known history of MEN2, pancreatitis, history, or retinopathy that they would not be a good candidate. Pt has verbalized that they do not have any previous history as described above and are willing to start this medication.  Pt also understands that this medication may cause Gallbladder disease or hypersensitivity to Ozempic.           Relevant Medications    semaglutide (OZEMPIC) 0.25 mg or 0.5 mg(2 mg/1.5 mL) pen injector    Pre-diabetes    Relevant Medications    semaglutide (OZEMPIC) 0.25 mg or 0.5 mg(2 mg/1.5 mL) pen injector       Other    Insomnia    Overview     Chronic, Stable, cont trazodone         Relevant Medications    traZODone (DESYREL) 100 MG tablet

## 2023-01-13 ENCOUNTER — PATIENT MESSAGE (OUTPATIENT)
Dept: PRIMARY CARE CLINIC | Facility: CLINIC | Age: 38
End: 2023-01-13
Payer: COMMERCIAL

## 2023-01-19 ENCOUNTER — PATIENT MESSAGE (OUTPATIENT)
Dept: INTERNAL MEDICINE | Facility: CLINIC | Age: 38
End: 2023-01-19
Payer: COMMERCIAL

## 2023-01-20 ENCOUNTER — PATIENT MESSAGE (OUTPATIENT)
Dept: INTERNAL MEDICINE | Facility: CLINIC | Age: 38
End: 2023-01-20
Payer: COMMERCIAL

## 2023-01-26 ENCOUNTER — PATIENT MESSAGE (OUTPATIENT)
Dept: INTERNAL MEDICINE | Facility: CLINIC | Age: 38
End: 2023-01-26
Payer: COMMERCIAL

## 2023-01-27 DIAGNOSIS — R73.03 PRE-DIABETES: ICD-10-CM

## 2023-01-27 DIAGNOSIS — E66.9 OBESITY (BMI 30.0-34.9): ICD-10-CM

## 2023-01-27 NOTE — TELEPHONE ENCOUNTER
Pt wants to get written prescription for the ozempic. He will send it to a pharmacy in [place]. Rx pended to print

## 2023-01-28 RX ORDER — SEMAGLUTIDE 1.34 MG/ML
INJECTION, SOLUTION SUBCUTANEOUS
Qty: 1 PEN | Refills: 0 | Status: SHIPPED | OUTPATIENT
Start: 2023-01-28 | End: 2023-02-15 | Stop reason: SDUPTHER

## 2023-02-14 ENCOUNTER — PATIENT MESSAGE (OUTPATIENT)
Dept: INTERNAL MEDICINE | Facility: CLINIC | Age: 38
End: 2023-02-14
Payer: COMMERCIAL

## 2023-02-14 DIAGNOSIS — Z98.1 HISTORY OF LUMBAR FUSION: ICD-10-CM

## 2023-02-15 ENCOUNTER — PATIENT MESSAGE (OUTPATIENT)
Dept: INTERNAL MEDICINE | Facility: CLINIC | Age: 38
End: 2023-02-15
Payer: COMMERCIAL

## 2023-02-15 DIAGNOSIS — R73.03 PRE-DIABETES: ICD-10-CM

## 2023-02-15 DIAGNOSIS — E66.9 OBESITY (BMI 30.0-34.9): ICD-10-CM

## 2023-02-15 RX ORDER — CYCLOBENZAPRINE HCL 10 MG
TABLET ORAL
Qty: 30 TABLET | Refills: 0 | Status: SHIPPED | OUTPATIENT
Start: 2023-02-15 | End: 2023-04-03

## 2023-02-16 RX ORDER — SEMAGLUTIDE 1.34 MG/ML
INJECTION, SOLUTION SUBCUTANEOUS
Qty: 1 PEN | Refills: 0 | Status: SHIPPED | OUTPATIENT
Start: 2023-02-16 | End: 2023-04-03 | Stop reason: CLARIF

## 2023-02-17 ENCOUNTER — TELEPHONE (OUTPATIENT)
Dept: INTERNAL MEDICINE | Facility: CLINIC | Age: 38
End: 2023-02-17
Payer: COMMERCIAL

## 2023-04-03 ENCOUNTER — OFFICE VISIT (OUTPATIENT)
Dept: INTERNAL MEDICINE | Facility: CLINIC | Age: 38
End: 2023-04-03
Payer: COMMERCIAL

## 2023-04-03 ENCOUNTER — LAB VISIT (OUTPATIENT)
Dept: LAB | Facility: HOSPITAL | Age: 38
End: 2023-04-03
Attending: FAMILY MEDICINE
Payer: COMMERCIAL

## 2023-04-03 VITALS
DIASTOLIC BLOOD PRESSURE: 89 MMHG | TEMPERATURE: 98 F | SYSTOLIC BLOOD PRESSURE: 118 MMHG | WEIGHT: 305.56 LBS | BODY MASS INDEX: 41.44 KG/M2 | HEART RATE: 73 BPM

## 2023-04-03 DIAGNOSIS — Z00.00 WELLNESS EXAMINATION: ICD-10-CM

## 2023-04-03 DIAGNOSIS — R73.03 PRE-DIABETES: ICD-10-CM

## 2023-04-03 DIAGNOSIS — F41.9 ANXIETY AND DEPRESSION: ICD-10-CM

## 2023-04-03 DIAGNOSIS — E78.1 HYPERTRIGLYCERIDEMIA: ICD-10-CM

## 2023-04-03 DIAGNOSIS — Z98.1 HISTORY OF LUMBAR FUSION: ICD-10-CM

## 2023-04-03 DIAGNOSIS — E66.9 OBESITY (BMI 30.0-34.9): ICD-10-CM

## 2023-04-03 DIAGNOSIS — Z00.00 WELLNESS EXAMINATION: Primary | ICD-10-CM

## 2023-04-03 DIAGNOSIS — G47.00 INSOMNIA, UNSPECIFIED TYPE: ICD-10-CM

## 2023-04-03 DIAGNOSIS — I10 HYPERTENSION, UNSPECIFIED TYPE: ICD-10-CM

## 2023-04-03 DIAGNOSIS — F32.A ANXIETY AND DEPRESSION: ICD-10-CM

## 2023-04-03 LAB
ALBUMIN SERPL BCP-MCNC: 3.7 G/DL (ref 3.5–5.2)
ALP SERPL-CCNC: 93 U/L (ref 55–135)
ALT SERPL W/O P-5'-P-CCNC: 53 U/L (ref 10–44)
ANION GAP SERPL CALC-SCNC: 12 MMOL/L (ref 8–16)
AST SERPL-CCNC: 28 U/L (ref 10–40)
BASOPHILS # BLD AUTO: 0.04 K/UL (ref 0–0.2)
BASOPHILS NFR BLD: 0.7 % (ref 0–1.9)
BILIRUB SERPL-MCNC: 0.5 MG/DL (ref 0.1–1)
BUN SERPL-MCNC: 13 MG/DL (ref 6–20)
CALCIUM SERPL-MCNC: 9.2 MG/DL (ref 8.7–10.5)
CHLORIDE SERPL-SCNC: 105 MMOL/L (ref 95–110)
CHOLEST SERPL-MCNC: 168 MG/DL (ref 120–199)
CHOLEST/HDLC SERPL: 5.6 {RATIO} (ref 2–5)
CO2 SERPL-SCNC: 22 MMOL/L (ref 23–29)
CREAT SERPL-MCNC: 1.1 MG/DL (ref 0.5–1.4)
DIFFERENTIAL METHOD: ABNORMAL
EOSINOPHIL # BLD AUTO: 0.1 K/UL (ref 0–0.5)
EOSINOPHIL NFR BLD: 1.6 % (ref 0–8)
ERYTHROCYTE [DISTWIDTH] IN BLOOD BY AUTOMATED COUNT: 14.6 % (ref 11.5–14.5)
EST. GFR  (NO RACE VARIABLE): >60 ML/MIN/1.73 M^2
GLUCOSE SERPL-MCNC: 76 MG/DL (ref 70–110)
HCT VFR BLD AUTO: 47 % (ref 40–54)
HDLC SERPL-MCNC: 30 MG/DL (ref 40–75)
HDLC SERPL: 17.9 % (ref 20–50)
HGB BLD-MCNC: 14.9 G/DL (ref 14–18)
IMM GRANULOCYTES # BLD AUTO: 0.02 K/UL (ref 0–0.04)
IMM GRANULOCYTES NFR BLD AUTO: 0.4 % (ref 0–0.5)
LDLC SERPL CALC-MCNC: 116.8 MG/DL (ref 63–159)
LYMPHOCYTES # BLD AUTO: 2 K/UL (ref 1–4.8)
LYMPHOCYTES NFR BLD: 34.5 % (ref 18–48)
MCH RBC QN AUTO: 27.5 PG (ref 27–31)
MCHC RBC AUTO-ENTMCNC: 31.7 G/DL (ref 32–36)
MCV RBC AUTO: 87 FL (ref 82–98)
MONOCYTES # BLD AUTO: 0.4 K/UL (ref 0.3–1)
MONOCYTES NFR BLD: 7.2 % (ref 4–15)
NEUTROPHILS # BLD AUTO: 3.2 K/UL (ref 1.8–7.7)
NEUTROPHILS NFR BLD: 55.6 % (ref 38–73)
NONHDLC SERPL-MCNC: 138 MG/DL
NRBC BLD-RTO: 0 /100 WBC
PLATELET # BLD AUTO: 319 K/UL (ref 150–450)
PMV BLD AUTO: 9 FL (ref 9.2–12.9)
POTASSIUM SERPL-SCNC: 4.6 MMOL/L (ref 3.5–5.1)
PROT SERPL-MCNC: 7.2 G/DL (ref 6–8.4)
RBC # BLD AUTO: 5.42 M/UL (ref 4.6–6.2)
SODIUM SERPL-SCNC: 139 MMOL/L (ref 136–145)
TRIGL SERPL-MCNC: 106 MG/DL (ref 30–150)
WBC # BLD AUTO: 5.68 K/UL (ref 3.9–12.7)

## 2023-04-03 PROCEDURE — 1159F PR MEDICATION LIST DOCUMENTED IN MEDICAL RECORD: ICD-10-PCS | Mod: CPTII,S$GLB,, | Performed by: FAMILY MEDICINE

## 2023-04-03 PROCEDURE — 99999 PR PBB SHADOW E&M-EST. PATIENT-LVL IV: ICD-10-PCS | Mod: PBBFAC,,, | Performed by: FAMILY MEDICINE

## 2023-04-03 PROCEDURE — 99395 PR PREVENTIVE VISIT,EST,18-39: ICD-10-PCS | Mod: S$GLB,,, | Performed by: FAMILY MEDICINE

## 2023-04-03 PROCEDURE — 36415 COLL VENOUS BLD VENIPUNCTURE: CPT | Mod: PO | Performed by: FAMILY MEDICINE

## 2023-04-03 PROCEDURE — 4010F ACE/ARB THERAPY RXD/TAKEN: CPT | Mod: CPTII,S$GLB,, | Performed by: FAMILY MEDICINE

## 2023-04-03 PROCEDURE — 1160F PR REVIEW ALL MEDS BY PRESCRIBER/CLIN PHARMACIST DOCUMENTED: ICD-10-PCS | Mod: CPTII,S$GLB,, | Performed by: FAMILY MEDICINE

## 2023-04-03 PROCEDURE — 1159F MED LIST DOCD IN RCRD: CPT | Mod: CPTII,S$GLB,, | Performed by: FAMILY MEDICINE

## 2023-04-03 PROCEDURE — 3008F PR BODY MASS INDEX (BMI) DOCUMENTED: ICD-10-PCS | Mod: CPTII,S$GLB,, | Performed by: FAMILY MEDICINE

## 2023-04-03 PROCEDURE — 99999 PR PBB SHADOW E&M-EST. PATIENT-LVL IV: CPT | Mod: PBBFAC,,, | Performed by: FAMILY MEDICINE

## 2023-04-03 PROCEDURE — 3079F DIAST BP 80-89 MM HG: CPT | Mod: CPTII,S$GLB,, | Performed by: FAMILY MEDICINE

## 2023-04-03 PROCEDURE — 99395 PREV VISIT EST AGE 18-39: CPT | Mod: S$GLB,,, | Performed by: FAMILY MEDICINE

## 2023-04-03 PROCEDURE — 85025 COMPLETE CBC W/AUTO DIFF WBC: CPT | Performed by: FAMILY MEDICINE

## 2023-04-03 PROCEDURE — 3074F SYST BP LT 130 MM HG: CPT | Mod: CPTII,S$GLB,, | Performed by: FAMILY MEDICINE

## 2023-04-03 PROCEDURE — 84443 ASSAY THYROID STIM HORMONE: CPT | Performed by: FAMILY MEDICINE

## 2023-04-03 PROCEDURE — 3008F BODY MASS INDEX DOCD: CPT | Mod: CPTII,S$GLB,, | Performed by: FAMILY MEDICINE

## 2023-04-03 PROCEDURE — 4010F PR ACE/ARB THEARPY RXD/TAKEN: ICD-10-PCS | Mod: CPTII,S$GLB,, | Performed by: FAMILY MEDICINE

## 2023-04-03 PROCEDURE — 1160F RVW MEDS BY RX/DR IN RCRD: CPT | Mod: CPTII,S$GLB,, | Performed by: FAMILY MEDICINE

## 2023-04-03 PROCEDURE — 80053 COMPREHEN METABOLIC PANEL: CPT | Performed by: FAMILY MEDICINE

## 2023-04-03 PROCEDURE — 3074F PR MOST RECENT SYSTOLIC BLOOD PRESSURE < 130 MM HG: ICD-10-PCS | Mod: CPTII,S$GLB,, | Performed by: FAMILY MEDICINE

## 2023-04-03 PROCEDURE — 3079F PR MOST RECENT DIASTOLIC BLOOD PRESSURE 80-89 MM HG: ICD-10-PCS | Mod: CPTII,S$GLB,, | Performed by: FAMILY MEDICINE

## 2023-04-03 PROCEDURE — 80061 LIPID PANEL: CPT | Performed by: FAMILY MEDICINE

## 2023-04-03 RX ORDER — SERTRALINE HYDROCHLORIDE 50 MG/1
50 TABLET, FILM COATED ORAL DAILY
Qty: 90 TABLET | Refills: 3 | Status: SHIPPED | OUTPATIENT
Start: 2023-04-03 | End: 2023-11-22

## 2023-04-03 RX ORDER — SEMAGLUTIDE 1.34 MG/ML
INJECTION, SOLUTION SUBCUTANEOUS
Qty: 4.5 ML | Refills: 0 | Status: CANCELLED | OUTPATIENT
Start: 2023-04-03 | End: 2023-07-02

## 2023-04-03 RX ORDER — TRAZODONE HYDROCHLORIDE 100 MG/1
100 TABLET ORAL NIGHTLY
Qty: 90 TABLET | Refills: 3 | Status: SHIPPED | OUTPATIENT
Start: 2023-04-03 | End: 2024-01-22 | Stop reason: SDUPTHER

## 2023-04-03 RX ORDER — LOSARTAN POTASSIUM 50 MG/1
50 TABLET ORAL DAILY
Qty: 90 TABLET | Refills: 3 | Status: SHIPPED | OUTPATIENT
Start: 2023-04-03 | End: 2023-06-12

## 2023-04-03 RX ORDER — CYCLOBENZAPRINE HCL 10 MG
10 TABLET ORAL NIGHTLY
Qty: 30 TABLET | Refills: 0 | Status: SHIPPED | OUTPATIENT
Start: 2023-04-03 | End: 2023-11-22

## 2023-04-03 RX ORDER — HYDROCHLOROTHIAZIDE 12.5 MG/1
12.5 TABLET ORAL EVERY MORNING
Qty: 90 TABLET | Refills: 3 | Status: SHIPPED | OUTPATIENT
Start: 2023-04-03 | End: 2024-01-22 | Stop reason: ALTCHOICE

## 2023-04-03 RX ORDER — SEMAGLUTIDE 2.68 MG/ML
2 INJECTION, SOLUTION SUBCUTANEOUS
Qty: 3 ML | Refills: 6 | Status: SHIPPED | OUTPATIENT
Start: 2023-04-03 | End: 2023-07-07 | Stop reason: SDUPTHER

## 2023-04-03 RX ORDER — ICOSAPENT ETHYL 1000 MG/1
2 CAPSULE ORAL 2 TIMES DAILY
Qty: 360 CAPSULE | Refills: 3 | Status: SHIPPED | OUTPATIENT
Start: 2023-04-03 | End: 2023-06-27

## 2023-04-03 NOTE — PROGRESS NOTES
Subjective:       Patient ID: Nato Ng is a 37 y.o. male.    Chief Complaint: No chief complaint on file.    Nato Ng is a 37 y.o. male and is here for a comprehensive physical exam.     Do you take any herbs or supplements that were not prescribed by a doctor? no  Are you taking calcium supplements? no  Are you taking aspirin daily? no     History:  Any STD's in the past? none    The following portions of the patient's history were reviewed and updated as appropriate: allergies, current medications, past family history, past medical history, past social history, past surgical history and problem list.    Review of Systems  Do you have pain that bothers you in your daily life? no  Pertinent items are noted in HPI.      2. Patient Counseling:  --Nutrition: Stressed importance of moderation in sodium/caffeine intake, saturated fat and cholesterol, caloric balance.  --Exercise: Stressed the importance of regular exercise.   --Substance Abuse: Discussed cessation/primary prevention of tobacco, alcohol - nonuser   --Sexuality: Discussed sexually transmitted disease.  --Injury prevention: Discussed safety belts, smoke detector.   --Dental health: Discussed dental health.  --Immunizations reviewed.      3. Discussed the patient's BMI.  4. Follow up as needed for acute illness        Review of Systems   Constitutional:  Negative for activity change and unexpected weight change.   HENT:  Negative for ear pain, hearing loss, rhinorrhea and trouble swallowing.    Eyes:  Negative for pain, discharge and visual disturbance.   Respiratory:  Negative for chest tightness, shortness of breath and wheezing.    Cardiovascular:  Negative for chest pain and palpitations.   Gastrointestinal:  Negative for abdominal pain, blood in stool, constipation, diarrhea and vomiting.   Endocrine: Negative for polydipsia and polyuria.   Genitourinary:  Negative for difficulty urinating, dysuria, hematuria and urgency.    Musculoskeletal:  Negative for arthralgias, joint swelling and neck pain.   Skin:  Negative for rash.   Neurological:  Negative for weakness and headaches.   Psychiatric/Behavioral:  Negative for confusion and dysphoric mood.      Objective:      Physical Exam  Vitals and nursing note reviewed.   Constitutional:       General: He is not in acute distress.     Appearance: He is well-developed. He is not diaphoretic.   HENT:      Head: Normocephalic and atraumatic.   Cardiovascular:      Rate and Rhythm: Normal rate and regular rhythm.   Pulmonary:      Effort: Pulmonary effort is normal. No respiratory distress.      Breath sounds: Normal breath sounds. No wheezing.   Abdominal:      General: There is no distension.      Palpations: Abdomen is soft.      Tenderness: There is no abdominal tenderness. There is no guarding.   Musculoskeletal:      Right lower leg: No edema.      Left lower leg: No edema.   Skin:     General: Skin is warm and dry.      Findings: No erythema or rash.   Neurological:      Mental Status: He is alert. Mental status is at baseline.   Psychiatric:         Mood and Affect: Mood normal.         Thought Content: Thought content normal.       Assessment:       No diagnosis found.    Plan:     Problem List Items Addressed This Visit    None

## 2023-04-04 LAB — TSH SERPL DL<=0.005 MIU/L-ACNC: 0.54 UIU/ML (ref 0.4–4)

## 2023-04-16 ENCOUNTER — PATIENT MESSAGE (OUTPATIENT)
Dept: INTERNAL MEDICINE | Facility: CLINIC | Age: 38
End: 2023-04-16
Payer: COMMERCIAL

## 2023-06-10 DIAGNOSIS — I10 HYPERTENSION, UNSPECIFIED TYPE: ICD-10-CM

## 2023-06-12 RX ORDER — LOSARTAN POTASSIUM 50 MG/1
50 TABLET ORAL DAILY
Qty: 90 TABLET | Refills: 3 | Status: SHIPPED | OUTPATIENT
Start: 2023-06-12 | End: 2024-01-22

## 2023-06-26 DIAGNOSIS — E78.1 HYPERTRIGLYCERIDEMIA: ICD-10-CM

## 2023-06-27 ENCOUNTER — OFFICE VISIT (OUTPATIENT)
Dept: FAMILY MEDICINE | Facility: CLINIC | Age: 38
End: 2023-06-27
Payer: COMMERCIAL

## 2023-06-27 VITALS
SYSTOLIC BLOOD PRESSURE: 136 MMHG | TEMPERATURE: 100 F | DIASTOLIC BLOOD PRESSURE: 88 MMHG | HEART RATE: 120 BPM | HEIGHT: 72 IN | OXYGEN SATURATION: 96 % | WEIGHT: 277.75 LBS | BODY MASS INDEX: 37.62 KG/M2

## 2023-06-27 DIAGNOSIS — U07.1 ACUTE COVID-19: Primary | ICD-10-CM

## 2023-06-27 DIAGNOSIS — R50.9 FEVER, UNSPECIFIED FEVER CAUSE: ICD-10-CM

## 2023-06-27 LAB
CTP QC/QA: YES
CTP QC/QA: YES
FLUAV AG NPH QL: NEGATIVE
FLUBV AG NPH QL: NEGATIVE
SARS-COV-2 RDRP RESP QL NAA+PROBE: POSITIVE

## 2023-06-27 PROCEDURE — 4010F ACE/ARB THERAPY RXD/TAKEN: CPT | Mod: CPTII,S$GLB,, | Performed by: FAMILY MEDICINE

## 2023-06-27 PROCEDURE — 3075F SYST BP GE 130 - 139MM HG: CPT | Mod: CPTII,S$GLB,, | Performed by: FAMILY MEDICINE

## 2023-06-27 PROCEDURE — 99999 PR PBB SHADOW E&M-EST. PATIENT-LVL IV: CPT | Mod: PBBFAC,,, | Performed by: FAMILY MEDICINE

## 2023-06-27 PROCEDURE — 3079F DIAST BP 80-89 MM HG: CPT | Mod: CPTII,S$GLB,, | Performed by: FAMILY MEDICINE

## 2023-06-27 PROCEDURE — 87804 INFLUENZA ASSAY W/OPTIC: CPT | Mod: 59,QW,S$GLB, | Performed by: FAMILY MEDICINE

## 2023-06-27 PROCEDURE — 1159F MED LIST DOCD IN RCRD: CPT | Mod: CPTII,S$GLB,, | Performed by: FAMILY MEDICINE

## 2023-06-27 PROCEDURE — 3008F BODY MASS INDEX DOCD: CPT | Mod: CPTII,S$GLB,, | Performed by: FAMILY MEDICINE

## 2023-06-27 PROCEDURE — 3079F PR MOST RECENT DIASTOLIC BLOOD PRESSURE 80-89 MM HG: ICD-10-PCS | Mod: CPTII,S$GLB,, | Performed by: FAMILY MEDICINE

## 2023-06-27 PROCEDURE — 4010F PR ACE/ARB THEARPY RXD/TAKEN: ICD-10-PCS | Mod: CPTII,S$GLB,, | Performed by: FAMILY MEDICINE

## 2023-06-27 PROCEDURE — 99999 PR PBB SHADOW E&M-EST. PATIENT-LVL IV: ICD-10-PCS | Mod: PBBFAC,,, | Performed by: FAMILY MEDICINE

## 2023-06-27 PROCEDURE — 1160F RVW MEDS BY RX/DR IN RCRD: CPT | Mod: CPTII,S$GLB,, | Performed by: FAMILY MEDICINE

## 2023-06-27 PROCEDURE — 99213 OFFICE O/P EST LOW 20 MIN: CPT | Mod: S$GLB,,, | Performed by: FAMILY MEDICINE

## 2023-06-27 PROCEDURE — 1160F PR REVIEW ALL MEDS BY PRESCRIBER/CLIN PHARMACIST DOCUMENTED: ICD-10-PCS | Mod: CPTII,S$GLB,, | Performed by: FAMILY MEDICINE

## 2023-06-27 PROCEDURE — 87635 SARS-COV-2 COVID-19 AMP PRB: CPT | Mod: QW,S$GLB,, | Performed by: FAMILY MEDICINE

## 2023-06-27 PROCEDURE — 3075F PR MOST RECENT SYSTOLIC BLOOD PRESS GE 130-139MM HG: ICD-10-PCS | Mod: CPTII,S$GLB,, | Performed by: FAMILY MEDICINE

## 2023-06-27 PROCEDURE — 99213 PR OFFICE/OUTPT VISIT, EST, LEVL III, 20-29 MIN: ICD-10-PCS | Mod: S$GLB,,, | Performed by: FAMILY MEDICINE

## 2023-06-27 PROCEDURE — 3008F PR BODY MASS INDEX (BMI) DOCUMENTED: ICD-10-PCS | Mod: CPTII,S$GLB,, | Performed by: FAMILY MEDICINE

## 2023-06-27 PROCEDURE — 1159F PR MEDICATION LIST DOCUMENTED IN MEDICAL RECORD: ICD-10-PCS | Mod: CPTII,S$GLB,, | Performed by: FAMILY MEDICINE

## 2023-06-27 PROCEDURE — 87804 POCT INFLUENZA A/B: ICD-10-PCS | Mod: 59,QW,S$GLB, | Performed by: FAMILY MEDICINE

## 2023-06-27 PROCEDURE — 87635: ICD-10-PCS | Mod: QW,S$GLB,, | Performed by: FAMILY MEDICINE

## 2023-06-27 RX ORDER — ICOSAPENT ETHYL 1000 MG/1
CAPSULE ORAL
Qty: 360 CAPSULE | Status: SHIPPED | OUTPATIENT
Start: 2023-06-27 | End: 2024-01-22

## 2023-06-27 NOTE — PROGRESS NOTES
Chief Complaint:    Chief Complaint   Patient presents with    Fever     Sinus issues started 1 day ago, spiked fever 103 this morning        History of Present Illness:  Patient with a hx of lumbar spondylosis, anxiety and depression, hypertriglyceridemia, insomnia, and tobacco use presents today for fever,    Has been feeling sick since yesterday, started running a 103 degree fever this morning   Tested positive for COVID, denies any SOB        ROS:  Review of Systems   Constitutional:  Positive for fever. Negative for appetite change and chills.   HENT:  Positive for congestion and postnasal drip. Negative for ear pain, rhinorrhea, sinus pressure and sinus pain.    Eyes:  Negative for pain.   Respiratory:  Negative for cough, chest tightness and shortness of breath.    Cardiovascular:  Negative for chest pain and palpitations.   Gastrointestinal:  Negative for abdominal pain, blood in stool, constipation, diarrhea and nausea.   Genitourinary:  Negative for difficulty urinating, dysuria, flank pain and hematuria.   Musculoskeletal:  Negative for arthralgias and myalgias.   Skin:  Negative for pallor and wound.   Neurological:  Negative for dizziness, tremors, speech difficulty, light-headedness and headaches.   Psychiatric/Behavioral:  Negative for behavioral problems, dysphoric mood and sleep disturbance. The patient is not nervous/anxious.    All other systems reviewed and are negative.    Past Medical History:   Diagnosis Date    Bulging lumbar disc     Fatigue 4/6/2018    Insomnia 4/6/2018    Upper respiratory tract infection 8/20/2021       Social History:  Social History     Socioeconomic History    Marital status:    Occupational History     Employer: [employer]   Tobacco Use    Smoking status: Every Day     Packs/day: 0.50     Years: 3.00     Pack years: 1.50     Types: Cigarettes     Start date: 2009    Smokeless tobacco: Current     Types: Snuff    Tobacco comments:     currently  quitting   Substance and Sexual Activity    Alcohol use: No    Drug use: No    Sexual activity: Yes     Partners: Female     Birth control/protection: I.U.D.     Social Determinants of Health     Financial Resource Strain: Low Risk     Difficulty of Paying Living Expenses: Not hard at all   Food Insecurity: No Food Insecurity    Worried About Running Out of Food in the Last Year: Never true    Ran Out of Food in the Last Year: Never true   Transportation Needs: No Transportation Needs    Lack of Transportation (Medical): No    Lack of Transportation (Non-Medical): No   Physical Activity: Unknown    Days of Exercise per Week: Patient refused    Minutes of Exercise per Session: 30 min   Stress: No Stress Concern Present    Feeling of Stress : Only a little   Social Connections: Unknown    Frequency of Communication with Friends and Family: More than three times a week    Frequency of Social Gatherings with Friends and Family: Once a week    Active Member of Clubs or Organizations: Yes    Attends Club or Organization Meetings: 1 to 4 times per year    Marital Status:    Housing Stability: Low Risk     Unable to Pay for Housing in the Last Year: No    Number of Places Lived in the Last Year: 1    Unstable Housing in the Last Year: No       Family History:   family history includes Arthritis in his maternal grandfather; Diabetes in his maternal grandfather.    Health Maintenance   Topic Date Due    TETANUS VACCINE  03/08/2027    Lipid Panel  04/03/2028    Hepatitis C Screening  Completed       Physical Exam:    Vital Signs  Temp: 99.9 °F (37.7 °C)  Pulse: (!) 120  SpO2: 96 %  BP: 136/88  BP Location: Left arm  Patient Position: Sitting  Height and Weight  Height: 6' (182.9 cm)  Weight: 126 kg (277 lb 12.5 oz)  BSA (Calculated - sq m): 2.53 sq meters  BMI (Calculated): 37.7  Weight in (lb) to have BMI = 25: 183.9]    Body mass index is 37.67 kg/m².    Physical Exam  Constitutional:       Appearance: Normal  appearance.   HENT:      Head: Normocephalic and atraumatic.      Right Ear: Tympanic membrane normal.      Left Ear: Tympanic membrane normal.   Eyes:      Extraocular Movements: Extraocular movements intact.      Pupils: Pupils are equal, round, and reactive to light.   Cardiovascular:      Rate and Rhythm: Normal rate and regular rhythm.      Pulses: Normal pulses.      Heart sounds: Normal heart sounds. No murmur heard.    No gallop.   Pulmonary:      Effort: Pulmonary effort is normal. No respiratory distress.      Breath sounds: Normal breath sounds. No wheezing, rhonchi or rales.   Abdominal:      General: There is no distension.      Palpations: Abdomen is soft.      Tenderness: There is no abdominal tenderness.   Musculoskeletal:         General: No swelling, deformity or signs of injury. Normal range of motion.      Cervical back: Normal range of motion.   Skin:     General: Skin is warm and dry.      Capillary Refill: Capillary refill takes less than 2 seconds.      Coloration: Skin is not jaundiced or pale.   Neurological:      General: No focal deficit present.      Mental Status: He is alert and oriented to person, place, and time.   Psychiatric:         Mood and Affect: Mood normal.         Behavior: Behavior normal.         Assessment:      ICD-10-CM ICD-9-CM   1. Fever, unspecified fever cause  R50.9 780.60   2. Acute COVID-19  U07.1 079.89           Plan:       To help with fever take some tylenol or ibuprofen  Take Nirmatrelvir-ritonavir for 5 days to help with COVID symptoms  Try to quarantine for at least a week         Orders Placed This Encounter   Procedures    POCT COVID-19 Rapid Screening    POCT Influenza A/B       Current Outpatient Medications   Medication Sig Dispense Refill    cyclobenzaprine (FLEXERIL) 10 MG tablet Take 1 tablet (10 mg total) by mouth every evening. 30 tablet 0    fluticasone propionate (FLONASE) 50 mcg/actuation nasal spray 1 spray (50 mcg total) by Each Nostril  route once daily. 16 mL 11    hydroCHLOROthiazide (HYDRODIURIL) 12.5 MG Tab Take 1 tablet (12.5 mg total) by mouth every morning. 90 tablet 3    icosapent ethyL (VASCEPA) 1 gram Cap Take 2 capsules (2 g total) by mouth 2 (two) times daily. 360 capsule 3Q    levocetirizine (XYZAL) 5 MG tablet Take 1 tablet (5 mg total) by mouth every evening. 90 tablet 3    losartan (COZAAR) 50 MG tablet Take 1 tablet (50 mg total) by mouth once daily. 90 tablet 3    meloxicam (MOBIC) 15 MG tablet TAKE 1 TABLET(15 MG) BY MOUTH EVERY DAY 90 tablet 0    semaglutide (OZEMPIC) 2 mg/dose (8 mg/3 mL) PnIj Inject 2 mg into the skin every 7 days. 3 mL 6    sertraline (ZOLOFT) 50 MG tablet Take 1 tablet (50 mg total) by mouth once daily. 90 tablet 3    traZODone (DESYREL) 100 MG tablet Take 1 tablet (100 mg total) by mouth every evening. 90 tablet 3    nirmatrelvir-ritonavir 300 mg (150 mg x 2)-100 mg copackaged tablets (EUA) Take 3 tablets by mouth 2 (two) times daily for 5 days. Each dose contains 2 nirmatrelvir (pink tablets) and 1 ritonavir (white tablet). Take all 3 tablets together 30 tablet 0     No current facility-administered medications for this visit.       There are no discontinued medications.    No follow-ups on file.      Ivonne Pineda MD  Scribe Attestation:   I, Liam Amaro, am scribing for, and in the presence of, Dr.Arif Pineda I performed the above scribed service and the documentation accurately describes the services I performed. I attest to the accuracy of the note.    I, Dr. Ivonne Pineda, reviewed documentation as scribed above. I performed the services described in this documentation.  I agree that the record reflects my personal performance and is accurate and complete. Ivonne Pineda MD.  10/04/2021

## 2023-07-03 PROBLEM — Z00.00 WELLNESS EXAMINATION: Status: RESOLVED | Noted: 2023-04-03 | Resolved: 2023-07-03

## 2023-07-07 ENCOUNTER — PATIENT MESSAGE (OUTPATIENT)
Dept: INTERNAL MEDICINE | Facility: CLINIC | Age: 38
End: 2023-07-07
Payer: COMMERCIAL

## 2023-07-07 DIAGNOSIS — E66.9 OBESITY (BMI 30.0-34.9): ICD-10-CM

## 2023-07-07 RX ORDER — SEMAGLUTIDE 2.68 MG/ML
2 INJECTION, SOLUTION SUBCUTANEOUS
Qty: 3 ML | Refills: 6 | Status: SHIPPED | OUTPATIENT
Start: 2023-07-07 | End: 2023-11-22

## 2023-10-18 ENCOUNTER — OFFICE VISIT (OUTPATIENT)
Dept: FAMILY MEDICINE | Facility: CLINIC | Age: 38
End: 2023-10-18
Payer: COMMERCIAL

## 2023-10-18 VITALS
DIASTOLIC BLOOD PRESSURE: 80 MMHG | SYSTOLIC BLOOD PRESSURE: 120 MMHG | OXYGEN SATURATION: 97 % | HEART RATE: 101 BPM | HEIGHT: 72 IN | BODY MASS INDEX: 39.26 KG/M2 | WEIGHT: 289.88 LBS

## 2023-10-18 DIAGNOSIS — H65.01 NON-RECURRENT ACUTE SEROUS OTITIS MEDIA OF RIGHT EAR: ICD-10-CM

## 2023-10-18 DIAGNOSIS — H91.91 HEARING LOSS OF RIGHT EAR, UNSPECIFIED HEARING LOSS TYPE: Primary | ICD-10-CM

## 2023-10-18 PROCEDURE — 3079F DIAST BP 80-89 MM HG: CPT | Mod: CPTII,S$GLB,, | Performed by: FAMILY MEDICINE

## 2023-10-18 PROCEDURE — 3074F PR MOST RECENT SYSTOLIC BLOOD PRESSURE < 130 MM HG: ICD-10-PCS | Mod: CPTII,S$GLB,, | Performed by: FAMILY MEDICINE

## 2023-10-18 PROCEDURE — 4010F PR ACE/ARB THEARPY RXD/TAKEN: ICD-10-PCS | Mod: CPTII,S$GLB,, | Performed by: FAMILY MEDICINE

## 2023-10-18 PROCEDURE — 4010F ACE/ARB THERAPY RXD/TAKEN: CPT | Mod: CPTII,S$GLB,, | Performed by: FAMILY MEDICINE

## 2023-10-18 PROCEDURE — 99213 OFFICE O/P EST LOW 20 MIN: CPT | Mod: S$GLB,,, | Performed by: FAMILY MEDICINE

## 2023-10-18 PROCEDURE — 3008F PR BODY MASS INDEX (BMI) DOCUMENTED: ICD-10-PCS | Mod: CPTII,S$GLB,, | Performed by: FAMILY MEDICINE

## 2023-10-18 PROCEDURE — 3008F BODY MASS INDEX DOCD: CPT | Mod: CPTII,S$GLB,, | Performed by: FAMILY MEDICINE

## 2023-10-18 PROCEDURE — 99999 PR PBB SHADOW E&M-EST. PATIENT-LVL III: ICD-10-PCS | Mod: PBBFAC,,, | Performed by: FAMILY MEDICINE

## 2023-10-18 PROCEDURE — 3074F SYST BP LT 130 MM HG: CPT | Mod: CPTII,S$GLB,, | Performed by: FAMILY MEDICINE

## 2023-10-18 PROCEDURE — 1159F MED LIST DOCD IN RCRD: CPT | Mod: CPTII,S$GLB,, | Performed by: FAMILY MEDICINE

## 2023-10-18 PROCEDURE — 1159F PR MEDICATION LIST DOCUMENTED IN MEDICAL RECORD: ICD-10-PCS | Mod: CPTII,S$GLB,, | Performed by: FAMILY MEDICINE

## 2023-10-18 PROCEDURE — 3079F PR MOST RECENT DIASTOLIC BLOOD PRESSURE 80-89 MM HG: ICD-10-PCS | Mod: CPTII,S$GLB,, | Performed by: FAMILY MEDICINE

## 2023-10-18 PROCEDURE — 99999 PR PBB SHADOW E&M-EST. PATIENT-LVL III: CPT | Mod: PBBFAC,,, | Performed by: FAMILY MEDICINE

## 2023-10-18 PROCEDURE — 99213 PR OFFICE/OUTPT VISIT, EST, LEVL III, 20-29 MIN: ICD-10-PCS | Mod: S$GLB,,, | Performed by: FAMILY MEDICINE

## 2023-10-18 RX ORDER — ICOSAPENT ETHYL 500 MG/1
CAPSULE ORAL
COMMUNITY
End: 2024-01-22

## 2023-10-18 RX ORDER — OXYMETAZOLINE HYDROCHLORIDE 0.05 G/100ML
1 SPRAY, METERED NASAL 2 TIMES DAILY
Qty: 30 ML | Refills: 1 | Status: SHIPPED | OUTPATIENT
Start: 2023-10-18 | End: 2023-10-21

## 2023-10-18 RX ORDER — FLUTICASONE PROPIONATE 50 MCG
2 SPRAY, SUSPENSION (ML) NASAL DAILY
Qty: 16 G | Refills: 11 | Status: SHIPPED | OUTPATIENT
Start: 2023-10-18 | End: 2024-01-22 | Stop reason: ALTCHOICE

## 2023-10-18 NOTE — PROGRESS NOTES
Chief Complaint:    Chief Complaint   Patient presents with    EAR INFECTION       History of Present Illness:  Patient with a hx of lumbar spondylosis, anxiety and depression, hypertriglyceridemia, insomnia, and tobacco use presents today for ear infection,    States he had an ear infection with to R ear last week. Says pain has mostly subsided but states that it is muffled. Does note having fever a few days before infection but subsided once infection began.  Was given Clindamycin, Prednisone and Cortisporin drops        ROS:  Review of Systems   Constitutional:  Negative for appetite change, chills and fever.   HENT:  Positive for ear pain (R). Negative for congestion, postnasal drip, rhinorrhea, sinus pressure and sinus pain.    Eyes:  Negative for pain.   Respiratory:  Negative for cough, chest tightness and shortness of breath.    Cardiovascular:  Negative for chest pain and palpitations.   Gastrointestinal:  Negative for abdominal pain, blood in stool, constipation, diarrhea and nausea.   Genitourinary:  Negative for difficulty urinating, dysuria, flank pain and hematuria.   Musculoskeletal:  Negative for arthralgias and myalgias.   Skin:  Negative for pallor and wound.   Neurological:  Negative for dizziness, tremors, speech difficulty, light-headedness and headaches.   Psychiatric/Behavioral:  Negative for behavioral problems, dysphoric mood and sleep disturbance. The patient is not nervous/anxious.    All other systems reviewed and are negative.      Past Medical History:   Diagnosis Date    Bulging lumbar disc     Fatigue 4/6/2018    Insomnia 4/6/2018    Upper respiratory tract infection 8/20/2021       Social History:  Social History     Socioeconomic History    Marital status:    Occupational History     Employer: [employer]   Tobacco Use    Smoking status: Every Day     Current packs/day: 0.50     Average packs/day: 0.5 packs/day for 14.8 years (7.4 ttl pk-yrs)     Types: Cigarettes      Start date: 2009    Smokeless tobacco: Current     Types: Snuff    Tobacco comments:     currently quitting   Substance and Sexual Activity    Alcohol use: No    Drug use: No    Sexual activity: Yes     Partners: Female     Birth control/protection: I.U.D.     Social Determinants of Health     Financial Resource Strain: Low Risk  (10/18/2023)    Overall Financial Resource Strain (CARDIA)     Difficulty of Paying Living Expenses: Not hard at all   Food Insecurity: No Food Insecurity (10/18/2023)    Hunger Vital Sign     Worried About Running Out of Food in the Last Year: Never true     Ran Out of Food in the Last Year: Never true   Transportation Needs: No Transportation Needs (10/18/2023)    PRAPARE - Transportation     Lack of Transportation (Medical): No     Lack of Transportation (Non-Medical): No   Physical Activity: Insufficiently Active (10/18/2023)    Exercise Vital Sign     Days of Exercise per Week: 2 days     Minutes of Exercise per Session: 30 min   Stress: No Stress Concern Present (10/18/2023)    [place] [employer] - Occupational Stress Questionnaire     Feeling of Stress : Only a little   Social Connections: Unknown (10/18/2023)    Social Connection and Isolation Panel [NHANES]     Frequency of Communication with Friends and Family: Twice a week     Frequency of Social Gatherings with Friends and Family: Once a week     Active Member of Clubs or Organizations: Yes     Attends Club or Organization Meetings: 1 to 4 times per year     Marital Status:    Housing Stability: Low Risk  (10/18/2023)    Housing Stability Vital Sign     Unable to Pay for Housing in the Last Year: No     Number of Places Lived in the Last Year: 1     Unstable Housing in the Last Year: No       Family History:   family history includes Arthritis in his maternal grandfather; Diabetes in his maternal grandfather.    Health Maintenance   Topic Date Due    TETANUS VACCINE  03/08/2027    Lipid Panel   04/03/2028    Hepatitis C Screening  Completed       Physical Exam:    Vital Signs  Pulse: 101  SpO2: 97 %  BP: 120/80  BP Location: Right arm  Patient Position: Sitting  Height and Weight  Height: 6' (182.9 cm)  Weight: 131.5 kg (289 lb 14.5 oz)  BSA (Calculated - sq m): 2.58 sq meters  BMI (Calculated): 39.3  Weight in (lb) to have BMI = 25: 183.9]    Body mass index is 39.32 kg/m².    Physical Exam  Constitutional:       Appearance: Normal appearance.   HENT:      Head: Normocephalic and atraumatic.      Right Ear: Tympanic membrane normal. Decreased hearing noted.      Left Ear: Tympanic membrane normal.   Eyes:      Extraocular Movements: Extraocular movements intact.      Pupils: Pupils are equal, round, and reactive to light.   Cardiovascular:      Rate and Rhythm: Normal rate and regular rhythm.      Pulses: Normal pulses.      Heart sounds: Normal heart sounds. No murmur heard.     No gallop.   Pulmonary:      Effort: Pulmonary effort is normal. No respiratory distress.      Breath sounds: Normal breath sounds. No wheezing, rhonchi or rales.   Abdominal:      General: There is no distension.      Palpations: Abdomen is soft.      Tenderness: There is no abdominal tenderness.   Musculoskeletal:         General: No swelling, deformity or signs of injury. Normal range of motion.      Cervical back: Normal range of motion.   Skin:     General: Skin is warm and dry.      Capillary Refill: Capillary refill takes less than 2 seconds.      Coloration: Skin is not jaundiced or pale.   Neurological:      General: No focal deficit present.      Mental Status: He is alert and oriented to person, place, and time.   Psychiatric:         Mood and Affect: Mood normal.         Behavior: Behavior normal.       Valsalva maneuver unsuccessful    Assessment:      ICD-10-CM ICD-9-CM   1. Hearing loss of right ear, unspecified hearing loss type  H91.91 389.9   2. Non-recurrent acute serous otitis media of right ear  H65.01 381.01            Plan:    Most likely acute serous otitis media non purulent, finish the antibiotic  Given prescription for Flonase and Afrin nasal spray for few days only.  Perform Valsalva maneuver  If no improvement in few days call back will refer to ENT.  No orders of the defined types were placed in this encounter.      Current Outpatient Medications   Medication Sig Dispense Refill    fluticasone propionate (FLONASE) 50 mcg/actuation nasal spray 1 spray (50 mcg total) by Each Nostril route once daily. 16 mL 11    hydroCHLOROthiazide (HYDRODIURIL) 12.5 MG Tab Take 1 tablet (12.5 mg total) by mouth every morning. 90 tablet 3    icosapent ethyL (VASCEPA) 0.5 gram Cap Vascepa Take No date recorded No form recorded No frequency recorded No route recorded No set duration recorded No set duration amount recorded suspended No dosage strength recorded No dosage strength units of measure recorded      icosapent ethyL (VASCEPA) 1 gram Cap Take 2 capsules (2 g total) by mouth 2 (two) times daily. 360 capsule 3Q    levocetirizine (XYZAL) 5 MG tablet Take 1 tablet (5 mg total) by mouth every evening. 90 tablet 3    losartan (COZAAR) 50 MG tablet Take 1 tablet (50 mg total) by mouth once daily. 90 tablet 3    semaglutide (OZEMPIC) 0.25 mg or 0.5 mg(2 mg/1.5 mL) pen injector Inject into the skin.      cyclobenzaprine (FLEXERIL) 10 MG tablet Take 1 tablet (10 mg total) by mouth every evening. 30 tablet 0    fluticasone propionate (FLONASE) 50 mcg/actuation nasal spray 2 sprays (100 mcg total) by Each Nostril route once daily. 16 g 11    meloxicam (MOBIC) 15 MG tablet TAKE 1 TABLET(15 MG) BY MOUTH EVERY DAY 90 tablet 0    oxymetazoline (AFRIN, OXYMETAZOLINE,) 0.05 % Mist 1 spray by Nasal route 2 (two) times daily. for 3 days 30 mL 1    semaglutide (OZEMPIC) 2 mg/dose (8 mg/3 mL) PnIj Inject 2 mg into the skin every 7 days. 3 mL 6    sertraline (ZOLOFT) 50 MG tablet Take 1 tablet (50 mg total) by mouth once daily. 90 tablet 3     traZODone (DESYREL) 100 MG tablet Take 1 tablet (100 mg total) by mouth every evening. 90 tablet 3     No current facility-administered medications for this visit.       There are no discontinued medications.    No follow-ups on file.      Ivonne Pineda MD  Scribe Attestation:   I, Liam Amaro, am scribing for, and in the presence of, Dr.Arif Pineda I performed the above scribed service and the documentation accurately describes the services I performed. I attest to the accuracy of the note.    I, Dr. Ivonne Pineda, reviewed documentation as scribed above. I performed the services described in this documentation.  I agree that the record reflects my personal performance and is accurate and complete. Ivonne Pineda MD.  10/04/2021

## 2023-11-02 ENCOUNTER — TELEPHONE (OUTPATIENT)
Dept: INTERNAL MEDICINE | Facility: CLINIC | Age: 38
End: 2023-11-02
Payer: COMMERCIAL

## 2023-11-02 NOTE — TELEPHONE ENCOUNTER
Attempting to contact pt to re-schedule primary care appt due to provider leaving.     Unable to reach patient at this time. Left voicemail.

## 2023-11-07 NOTE — TELEPHONE ENCOUNTER
2nd attempt  Attempting to contact pt to re-schedule primary care appt due to provider leaving.     Unable to reach patient at this time. Left voicemail.

## 2023-11-08 NOTE — TELEPHONE ENCOUNTER
3rd attempt  Attempting to contact pt to re-schedule primary care appt due to provider leaving.     Unable to reach patient at this time. Left voicemail.

## 2023-11-22 ENCOUNTER — OFFICE VISIT (OUTPATIENT)
Dept: INTERNAL MEDICINE | Facility: CLINIC | Age: 38
End: 2023-11-22
Payer: COMMERCIAL

## 2023-11-22 ENCOUNTER — TELEPHONE (OUTPATIENT)
Dept: INTERNAL MEDICINE | Facility: CLINIC | Age: 38
End: 2023-11-22

## 2023-11-22 DIAGNOSIS — H91.91 DECREASED HEARING OF RIGHT EAR: ICD-10-CM

## 2023-11-22 DIAGNOSIS — H65.91 RIGHT OTITIS MEDIA WITH EFFUSION: Primary | ICD-10-CM

## 2023-11-22 DIAGNOSIS — H92.01 RIGHT EAR PAIN: ICD-10-CM

## 2023-11-22 PROCEDURE — 4010F PR ACE/ARB THEARPY RXD/TAKEN: ICD-10-PCS | Mod: CPTII,95,, | Performed by: FAMILY MEDICINE

## 2023-11-22 PROCEDURE — 1159F PR MEDICATION LIST DOCUMENTED IN MEDICAL RECORD: ICD-10-PCS | Mod: CPTII,95,, | Performed by: FAMILY MEDICINE

## 2023-11-22 PROCEDURE — 1160F PR REVIEW ALL MEDS BY PRESCRIBER/CLIN PHARMACIST DOCUMENTED: ICD-10-PCS | Mod: CPTII,95,, | Performed by: FAMILY MEDICINE

## 2023-11-22 PROCEDURE — 1159F MED LIST DOCD IN RCRD: CPT | Mod: CPTII,95,, | Performed by: FAMILY MEDICINE

## 2023-11-22 PROCEDURE — 1160F RVW MEDS BY RX/DR IN RCRD: CPT | Mod: CPTII,95,, | Performed by: FAMILY MEDICINE

## 2023-11-22 PROCEDURE — 99213 PR OFFICE/OUTPT VISIT, EST, LEVL III, 20-29 MIN: ICD-10-PCS | Mod: 95,,, | Performed by: FAMILY MEDICINE

## 2023-11-22 PROCEDURE — 4010F ACE/ARB THERAPY RXD/TAKEN: CPT | Mod: CPTII,95,, | Performed by: FAMILY MEDICINE

## 2023-11-22 PROCEDURE — 99213 OFFICE O/P EST LOW 20 MIN: CPT | Mod: 95,,, | Performed by: FAMILY MEDICINE

## 2023-11-22 RX ORDER — PREDNISONE 50 MG/1
50 TABLET ORAL DAILY
Qty: 5 TABLET | Refills: 0 | Status: SHIPPED | OUTPATIENT
Start: 2023-11-22 | End: 2023-11-27

## 2023-11-22 RX ORDER — GUAIFENESIN AND PSEUDOEPHEDRINE HCL 1200; 120 MG/1; MG/1
1 TABLET, EXTENDED RELEASE ORAL 2 TIMES DAILY
Qty: 30 EACH | Refills: 0 | Status: SHIPPED | OUTPATIENT
Start: 2023-11-22 | End: 2023-12-02

## 2023-11-22 NOTE — TELEPHONE ENCOUNTER
----- Message from Quinton Carrasco MD sent at 11/22/2023  7:48 AM CST -----  Patient seen this morning for telemedicine visit   ENT referral ordered  Please schedule

## 2023-11-22 NOTE — TELEPHONE ENCOUNTER
Spoke with pt; pt had visit concerning ear pain; MA schedule next available to ENT; pt verbalized understanding /LD

## 2023-11-22 NOTE — PROGRESS NOTES
Subjective:   Patient ID: Nato Ng is a 38 y.o. male.  Chief Complaint:  Otalgia    The patient location is: [place]  The chief complaint leading to consultation is:  Right ear pain and decreased hearing    Visit type: audiovisual    Face to Face time with patient: 10 minutes  20 minutes of total time spent on the encounter, which includes face to face time and non-face to face time preparing to see the patient (eg, review of tests), Obtaining and/or reviewing separately obtained history, Documenting clinical information in the electronic or other health record, Independently interpreting results (not separately reported) and communicating results to the patient/family/caregiver, or Care coordination (not separately reported).     Each patient to whom he or she provides medical services by telemedicine is:  (1) informed of the relationship between the physician and patient and the respective role of any other health care provider with respect to management of the patient; and (2) notified that he or she may decline to receive medical services by telemedicine and may withdraw from such care at any time.    Presents for evaluation of recurrent right ear pain and decreased hearing   Reviewed chart which shows urgent care visit 10/09/2023.  Diagnosed acute otitis media with acute otitis externa.  Treated with oral clindamycin and Cipro HC drops.    Return to urgent care on 10/11/2023.  Given injections of Toradol and Decadron.  Provided prescriptions for prednisone and Ala-Hist E.  Follow-up with PCP on 10/18/2023 for persistent decreased hearing.  TM and ear canal documented as normal.  Advised to continue Xyzal, restart Xyzal daily, and restart Flonase daily.  Reports did well till 24-48 hours ago when has had recurrent increased fullness, pain, and decreased hearing  Had been advised by PCP if symptoms did not improve or recurred would need ENT referral       Otalgia   There is pain in the right ear.  This is a recurrent problem. The current episode started yesterday. The problem occurs constantly. The problem has been gradually worsening. There has been no fever. The fever has been present for Less than 1 day. The pain is at a severity of 7/10. The pain is moderate. Pertinent negatives include no abdominal pain, coughing, diarrhea, drainage, ear discharge, headaches, hearing loss, neck pain, rash, rhinorrhea, sore throat or vomiting. He has tried ear drops for the symptoms. The treatment provided no relief. There is no history of a chronic ear infection, hearing loss or a tympanostomy tube.     Review of Systems   HENT:  Positive for ear pain. Negative for ear discharge, hearing loss, rhinorrhea and sore throat.    Respiratory:  Negative for cough.    Gastrointestinal:  Negative for abdominal pain, diarrhea and vomiting.   Musculoskeletal:  Negative for neck pain.   Skin:  Negative for rash.   Neurological:  Negative for headaches.     Objective:     Physical Exam  Constitutional:       Appearance: Normal appearance.   Psychiatric:         Mood and Affect: Mood and affect normal.       Assessment:       ICD-10-CM ICD-9-CM   1. Right otitis media with effusion  H65.91 381.4   2. Right ear pain  H92.01 388.70   3. Decreased hearing of right ear  H91.91 389.9     Plan:   Right otitis media with effusion  Right ear pain  Decreased hearing of right ear  -     predniSONE (DELTASONE) 50 MG Tab; Take 1 tablet (50 mg total) by mouth once daily. for 5 days  Dispense: 5 tablet; Refill: 0  -     pseudoephedrine-guaiFENesin (MUCINEX D MAXIMUM STRENGTH) 120-1,200 mg Tb12; Take 1 tablet by mouth 2 (two) times daily. for 10 days  Dispense: 30 each; Refill: 0  -     Ambulatory referral/consult to ENT; Future; Expected date: 11/29/2023    Inability to adequately evaluate your through telemedicine visit   Based on history sounds like persistent otitis media with effusion   Can continue Xyzal and Flonase   Can take additional  course of prednisone and Mucinex D  ENT referral ordered as previously advised by his PCP  Follow-up with PCP as scheduled/as needed

## 2024-01-05 ENCOUNTER — PATIENT MESSAGE (OUTPATIENT)
Dept: INTERNAL MEDICINE | Facility: CLINIC | Age: 39
End: 2024-01-05
Payer: COMMERCIAL

## 2024-01-22 ENCOUNTER — OFFICE VISIT (OUTPATIENT)
Dept: FAMILY MEDICINE | Facility: CLINIC | Age: 39
End: 2024-01-22
Payer: COMMERCIAL

## 2024-01-22 ENCOUNTER — LAB VISIT (OUTPATIENT)
Dept: LAB | Facility: HOSPITAL | Age: 39
End: 2024-01-22
Attending: FAMILY MEDICINE
Payer: COMMERCIAL

## 2024-01-22 VITALS
WEIGHT: 288.94 LBS | DIASTOLIC BLOOD PRESSURE: 86 MMHG | OXYGEN SATURATION: 96 % | SYSTOLIC BLOOD PRESSURE: 122 MMHG | HEART RATE: 96 BPM | HEIGHT: 72 IN | BODY MASS INDEX: 39.14 KG/M2

## 2024-01-22 DIAGNOSIS — Z98.1 HISTORY OF LUMBAR FUSION: ICD-10-CM

## 2024-01-22 DIAGNOSIS — R73.03 PREDIABETES: ICD-10-CM

## 2024-01-22 DIAGNOSIS — Z00.00 WELL ADULT EXAM: Primary | ICD-10-CM

## 2024-01-22 DIAGNOSIS — G47.00 INSOMNIA, UNSPECIFIED TYPE: ICD-10-CM

## 2024-01-22 PROCEDURE — 1159F MED LIST DOCD IN RCRD: CPT | Mod: CPTII,S$GLB,, | Performed by: FAMILY MEDICINE

## 2024-01-22 PROCEDURE — 3008F BODY MASS INDEX DOCD: CPT | Mod: CPTII,S$GLB,, | Performed by: FAMILY MEDICINE

## 2024-01-22 PROCEDURE — 3074F SYST BP LT 130 MM HG: CPT | Mod: CPTII,S$GLB,, | Performed by: FAMILY MEDICINE

## 2024-01-22 PROCEDURE — 36415 COLL VENOUS BLD VENIPUNCTURE: CPT | Mod: PO | Performed by: FAMILY MEDICINE

## 2024-01-22 PROCEDURE — 3079F DIAST BP 80-89 MM HG: CPT | Mod: CPTII,S$GLB,, | Performed by: FAMILY MEDICINE

## 2024-01-22 PROCEDURE — 99999 PR PBB SHADOW E&M-EST. PATIENT-LVL III: CPT | Mod: PBBFAC,,, | Performed by: FAMILY MEDICINE

## 2024-01-22 PROCEDURE — 3044F HG A1C LEVEL LT 7.0%: CPT | Mod: CPTII,S$GLB,, | Performed by: FAMILY MEDICINE

## 2024-01-22 PROCEDURE — 83036 HEMOGLOBIN GLYCOSYLATED A1C: CPT | Performed by: FAMILY MEDICINE

## 2024-01-22 PROCEDURE — 99395 PREV VISIT EST AGE 18-39: CPT | Mod: S$GLB,,, | Performed by: FAMILY MEDICINE

## 2024-01-22 PROCEDURE — 1160F RVW MEDS BY RX/DR IN RCRD: CPT | Mod: CPTII,S$GLB,, | Performed by: FAMILY MEDICINE

## 2024-01-22 RX ORDER — TRAZODONE HYDROCHLORIDE 100 MG/1
100 TABLET ORAL NIGHTLY
Qty: 90 TABLET | Refills: 3 | Status: SHIPPED | OUTPATIENT
Start: 2024-01-22 | End: 2024-07-20

## 2024-01-22 RX ORDER — MELOXICAM 15 MG/1
15 TABLET ORAL DAILY
Qty: 90 TABLET | Refills: 0 | Status: SHIPPED | OUTPATIENT
Start: 2024-01-22

## 2024-01-22 NOTE — PROGRESS NOTES
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    Prediabetic in 2022. Says after he used Ozempic for some time it improved.     No hx of heart disease    Takes Trazodone and Meloxicam prn    Quit smoking 5 years ago.       ROS:  Review of Systems   Constitutional:  Negative for appetite change, chills and fever.   HENT:  Negative for congestion, ear pain, postnasal drip, rhinorrhea, sinus pressure and sinus pain.    Eyes:  Negative for pain.   Respiratory:  Negative for cough, chest tightness and shortness of breath.    Cardiovascular:  Negative for chest pain and palpitations.   Gastrointestinal:  Negative for abdominal pain, blood in stool, constipation, diarrhea and nausea.   Genitourinary:  Negative for difficulty urinating, dysuria, flank pain and hematuria.   Musculoskeletal:  Negative for arthralgias and myalgias.   Skin:  Negative for pallor and wound.   Neurological:  Negative for dizziness, tremors, speech difficulty, light-headedness and headaches.   Psychiatric/Behavioral:  Negative for behavioral problems, dysphoric mood and sleep disturbance. The patient is not nervous/anxious.    All other systems reviewed and are negative.      Past Medical History:   Diagnosis Date    Bulging lumbar disc     Fatigue 4/6/2018    Insomnia 4/6/2018    Upper respiratory tract infection 8/20/2021       Social History:  Social History     Socioeconomic History    Marital status:    Occupational History     Employer: [employer]   Tobacco Use    Smoking status: Former     Current packs/day: 0.50     Average packs/day: 0.5 packs/day for 15.1 years (7.5 ttl pk-yrs)     Types: Vaping with nicotine, Cigarettes     Start  date: 1/1/2009    Smokeless tobacco: Current     Types: Snuff    Tobacco comments:     currently quitting   Substance and Sexual Activity    Alcohol use: No    Drug use: No    Sexual activity: Yes     Partners: Female     Birth control/protection: I.U.D.     Social Determinants of Health     Financial Resource Strain: Low Risk  (10/18/2023)    Overall Financial Resource Strain (CARDIA)     Difficulty of Paying Living Expenses: Not hard at all   Food Insecurity: No Food Insecurity (10/18/2023)    Hunger Vital Sign     Worried About Running Out of Food in the Last Year: Never true     Ran Out of Food in the Last Year: Never true   Transportation Needs: No Transportation Needs (10/18/2023)    PRAPARE - Transportation     Lack of Transportation (Medical): No     Lack of Transportation (Non-Medical): No   Physical Activity: Insufficiently Active (11/21/2023)    Exercise Vital Sign     Days of Exercise per Week: 1 day     Minutes of Exercise per Session: 30 min   Stress: No Stress Concern Present (10/18/2023)    [place] - Occupational Stress Questionnaire     Feeling of Stress : Only a little   Social Connections: Unknown (10/18/2023)    Social Connection and Isolation Panel [NHANES]     Frequency of Communication with Friends and Family: Twice a week     Frequency of Social Gatherings with Friends and Family: Once a week     Active Member of Clubs or Organizations: Yes     Attends Club or Organization Meetings: 1 to 4 times per year     Marital Status:    Housing Stability: Low Risk  (10/18/2023)    Housing Stability Vital Sign     Unable to Pay for Housing in the Last Year: No     Number of Places Lived in the Last Year: 1     Unstable Housing in the Last Year: No       Family History:   family history includes Arthritis in his maternal grandfather; Diabetes in his maternal grandfather.    Health Maintenance   Topic Date Due    TETANUS VACCINE  03/08/2027    Lipid Panel   04/03/2028    Hepatitis C Screening  Completed       Physical Exam:    Vital Signs  Pulse: 96  SpO2: 96 %  BP: 122/86  BP Location: Left arm  Patient Position: Sitting  Pain Score: 0-No pain  Height and Weight  Height: 6' (182.9 cm)  Weight: 131.1 kg (288 lb 14.6 oz)  BSA (Calculated - sq m): 2.58 sq meters  BMI (Calculated): 39.2  Weight in (lb) to have BMI = 25: 183.9]    Body mass index is 39.18 kg/m².    Physical Exam  Constitutional:       Appearance: Normal appearance.   HENT:      Head: Normocephalic and atraumatic.      Right Ear: Tympanic membrane normal.      Left Ear: Tympanic membrane normal.   Eyes:      Extraocular Movements: Extraocular movements intact.      Pupils: Pupils are equal, round, and reactive to light.   Cardiovascular:      Rate and Rhythm: Normal rate and regular rhythm.      Pulses: Normal pulses.      Heart sounds: Normal heart sounds. No murmur heard.     No gallop.   Pulmonary:      Effort: Pulmonary effort is normal. No respiratory distress.      Breath sounds: Normal breath sounds. No wheezing, rhonchi or rales.   Abdominal:      General: There is no distension.      Palpations: Abdomen is soft.      Tenderness: There is no abdominal tenderness.   Musculoskeletal:         General: No swelling, deformity or signs of injury. Normal range of motion.      Cervical back: Normal range of motion.   Skin:     General: Skin is warm and dry.      Capillary Refill: Capillary refill takes less than 2 seconds.      Coloration: Skin is not jaundiced or pale.   Neurological:      General: No focal deficit present.      Mental Status: He is alert and oriented to person, place, and time.   Psychiatric:         Mood and Affect: Mood normal.         Behavior: Behavior normal.         Assessment:      ICD-10-CM ICD-9-CM   1. Well adult exam  Z00.00 V70.0   2. Insomnia, unspecified type  G47.00 780.52   3. History of lumbar fusion  Z98.1 V45.4   4. Prediabetes  R73.03 790.29             Plan:    Filled  out [employer] form for police academy.  A1C check.     Orders Placed This Encounter   Procedures    HEMOGLOBIN A1C       Current Outpatient Medications   Medication Sig Dispense Refill    levocetirizine (XYZAL) 5 MG tablet Take 1 tablet (5 mg total) by mouth every evening. 90 tablet 3    meloxicam (MOBIC) 15 MG tablet Take 1 tablet (15 mg total) by mouth once daily. 90 tablet 0    traZODone (DESYREL) 100 MG tablet Take 1 tablet (100 mg total) by mouth every evening. 90 tablet 3     No current facility-administered medications for this visit.       Medications Discontinued During This Encounter   Medication Reason    fluticasone propionate (FLONASE) 50 mcg/actuation nasal spray Therapy completed    hydroCHLOROthiazide (HYDRODIURIL) 12.5 MG Tab Therapy completed    fluticasone propionate (FLONASE) 50 mcg/actuation nasal spray Therapy completed    icosapent ethyL (VASCEPA) 0.5 gram Cap Other - Commment Required    losartan (COZAAR) 50 MG tablet Other - Commment Required    icosapent ethyL (VASCEPA) 1 gram Cap Other - Commment Required    semaglutide (OZEMPIC) 0.25 mg or 0.5 mg(2 mg/1.5 mL) pen injector     meloxicam (MOBIC) 15 MG tablet Reorder    traZODone (DESYREL) 100 MG tablet Reorder       No follow-ups on file.      Ivonne Pineda MD  Scribe Attestation:   I, Liam Amaro, am scribing for, and in the presence of, Dr.Arif Pineda I performed the above scribed service and the documentation accurately describes the services I performed. I attest to the accuracy of the note.    I, Dr. Ivonne Pineda, reviewed documentation as scribed above. I performed the services described in this documentation.  I agree that the record reflects my personal performance and is accurate and complete. Ivonne Pineda MD.  10/04/2021

## 2024-01-23 LAB
ESTIMATED AVG GLUCOSE: 111 MG/DL (ref 68–131)
HBA1C MFR BLD: 5.5 % (ref 4–5.6)

## 2024-04-10 DIAGNOSIS — I10 HYPERTENSION: ICD-10-CM

## 2024-11-27 DIAGNOSIS — Z00.00 ROUTINE GENERAL MEDICAL EXAMINATION AT A HEALTH CARE FACILITY: Primary | ICD-10-CM

## 2024-12-03 DIAGNOSIS — Z00.00 ROUTINE GENERAL MEDICAL EXAMINATION AT A HEALTH CARE FACILITY: Primary | ICD-10-CM

## 2024-12-10 ENCOUNTER — CLINICAL SUPPORT (OUTPATIENT)
Dept: INTERNAL MEDICINE | Facility: CLINIC | Age: 39
End: 2024-12-10
Payer: COMMERCIAL

## 2024-12-10 ENCOUNTER — CLINICAL SUPPORT (OUTPATIENT)
Dept: INTERNAL MEDICINE | Facility: CLINIC | Age: 39
End: 2024-12-10

## 2024-12-10 ENCOUNTER — HOSPITAL ENCOUNTER (OUTPATIENT)
Dept: RADIOLOGY | Facility: HOSPITAL | Age: 39
Discharge: HOME | End: 2024-12-10
Attending: FAMILY MEDICINE

## 2024-12-10 ENCOUNTER — OFFICE VISIT (OUTPATIENT)
Dept: INTERNAL MEDICINE | Facility: CLINIC | Age: 39
End: 2024-12-10
Payer: COMMERCIAL

## 2024-12-10 ENCOUNTER — HOSPITAL ENCOUNTER (OUTPATIENT)
Dept: CARDIOLOGY | Facility: HOSPITAL | Age: 39
Discharge: HOME | End: 2024-12-10
Attending: FAMILY MEDICINE

## 2024-12-10 VITALS
TEMPERATURE: 97 F | SYSTOLIC BLOOD PRESSURE: 138 MMHG | WEIGHT: 290 LBS | DIASTOLIC BLOOD PRESSURE: 80 MMHG | HEART RATE: 91 BPM | BODY MASS INDEX: 39.28 KG/M2 | HEIGHT: 72 IN

## 2024-12-10 DIAGNOSIS — Z00.00 ROUTINE GENERAL MEDICAL EXAMINATION AT A HEALTH CARE FACILITY: Primary | ICD-10-CM

## 2024-12-10 DIAGNOSIS — Z87.891 FORMER SMOKER: ICD-10-CM

## 2024-12-10 DIAGNOSIS — Z12.9 CANCER SCREENING: Primary | ICD-10-CM

## 2024-12-10 DIAGNOSIS — J39.8 TRACHEAL DEVIATION: ICD-10-CM

## 2024-12-10 DIAGNOSIS — I10 PRIMARY HYPERTENSION: ICD-10-CM

## 2024-12-10 DIAGNOSIS — R93.89 ABNORMAL CXR (CHEST X-RAY): ICD-10-CM

## 2024-12-10 DIAGNOSIS — E78.1 HYPERTRIGLYCERIDEMIA: ICD-10-CM

## 2024-12-10 DIAGNOSIS — Z00.00 ROUTINE GENERAL MEDICAL EXAMINATION AT A HEALTH CARE FACILITY: ICD-10-CM

## 2024-12-10 DIAGNOSIS — E66.01 SEVERE OBESITY (BMI 35.0-39.9) WITH COMORBIDITY: ICD-10-CM

## 2024-12-10 DIAGNOSIS — R73.03 PREDIABETES: ICD-10-CM

## 2024-12-10 DIAGNOSIS — E07.9 THYROID MASS: ICD-10-CM

## 2024-12-10 PROBLEM — H00.011 HORDEOLUM EXTERNUM OF RIGHT UPPER EYELID: Status: RESOLVED | Noted: 2022-09-02 | Resolved: 2024-12-10

## 2024-12-10 PROBLEM — E66.811 OBESITY (BMI 30.0-34.9): Status: RESOLVED | Noted: 2017-03-08 | Resolved: 2024-12-10

## 2024-12-10 PROBLEM — Z28.9 DELAYED IMMUNIZATIONS: Status: RESOLVED | Noted: 2022-09-02 | Resolved: 2024-12-10

## 2024-12-10 LAB
ALBUMIN SERPL BCP-MCNC: 4 G/DL (ref 3.5–5.2)
ALP SERPL-CCNC: 83 U/L (ref 40–150)
ALT SERPL W/O P-5'-P-CCNC: 40 U/L (ref 10–44)
ANION GAP SERPL CALC-SCNC: 10 MMOL/L (ref 8–16)
AST SERPL-CCNC: 18 U/L (ref 10–40)
BILIRUB SERPL-MCNC: 0.9 MG/DL (ref 0.1–1)
BILIRUB UR QL STRIP: NEGATIVE
BUN SERPL-MCNC: 13 MG/DL (ref 6–20)
CALCIUM SERPL-MCNC: 9.7 MG/DL (ref 8.7–10.5)
CHLORIDE SERPL-SCNC: 104 MMOL/L (ref 95–110)
CHOLEST SERPL-MCNC: 200 MG/DL (ref 120–199)
CHOLEST/HDLC SERPL: 5.9 {RATIO} (ref 2–5)
CLARITY UR: CLEAR
CO2 SERPL-SCNC: 25 MMOL/L (ref 23–29)
COLOR UR: YELLOW
COMPLEXED PSA SERPL-MCNC: 0.57 NG/ML (ref 0–4)
CREAT SERPL-MCNC: 1.1 MG/DL (ref 0.5–1.4)
ERYTHROCYTE [DISTWIDTH] IN BLOOD BY AUTOMATED COUNT: 13.3 % (ref 11.5–14.5)
EST. GFR  (NO RACE VARIABLE): >60 ML/MIN/1.73 M^2
ESTIMATED AVG GLUCOSE: 111 MG/DL (ref 68–131)
GLUCOSE SERPL-MCNC: 98 MG/DL (ref 70–110)
GLUCOSE UR QL STRIP: NEGATIVE
HBA1C MFR BLD: 5.5 % (ref 4–5.6)
HCT VFR BLD AUTO: 47.6 % (ref 40–54)
HDLC SERPL-MCNC: 34 MG/DL (ref 40–75)
HDLC SERPL: 17 % (ref 20–50)
HGB BLD-MCNC: 16.1 G/DL (ref 14–18)
HGB UR QL STRIP: NEGATIVE
KETONES UR QL STRIP: NEGATIVE
LDLC SERPL CALC-MCNC: 111.2 MG/DL (ref 63–159)
LEUKOCYTE ESTERASE UR QL STRIP: NEGATIVE
MCH RBC QN AUTO: 28.4 PG (ref 27–31)
MCHC RBC AUTO-ENTMCNC: 33.8 G/DL (ref 32–36)
MCV RBC AUTO: 84 FL (ref 82–98)
NITRITE UR QL STRIP: NEGATIVE
NONHDLC SERPL-MCNC: 166 MG/DL
OHS QRS DURATION: 104 MS
OHS QTC CALCULATION: 415 MS
PH UR STRIP: 7 [PH] (ref 5–8)
PLATELET # BLD AUTO: 297 K/UL (ref 150–450)
PMV BLD AUTO: 8.9 FL (ref 9.2–12.9)
POTASSIUM SERPL-SCNC: 4.1 MMOL/L (ref 3.5–5.1)
PROT SERPL-MCNC: 7.7 G/DL (ref 6–8.4)
PROT UR QL STRIP: NEGATIVE
RBC # BLD AUTO: 5.67 M/UL (ref 4.6–6.2)
SODIUM SERPL-SCNC: 139 MMOL/L (ref 136–145)
SP GR UR STRIP: 1.02 (ref 1–1.03)
TRIGL SERPL-MCNC: 274 MG/DL (ref 30–150)
TSH SERPL DL<=0.005 MIU/L-ACNC: 1.97 UIU/ML (ref 0.4–4)
URN SPEC COLLECT METH UR: NORMAL
WBC # BLD AUTO: 6.02 K/UL (ref 3.9–12.7)

## 2024-12-10 PROCEDURE — 83036 HEMOGLOBIN GLYCOSYLATED A1C: CPT | Performed by: FAMILY MEDICINE

## 2024-12-10 PROCEDURE — 71046 X-RAY EXAM CHEST 2 VIEWS: CPT | Mod: TC

## 2024-12-10 PROCEDURE — 93005 ELECTROCARDIOGRAM TRACING: CPT

## 2024-12-10 PROCEDURE — 84153 ASSAY OF PSA TOTAL: CPT | Performed by: FAMILY MEDICINE

## 2024-12-10 PROCEDURE — 97802 MEDICAL NUTRITION INDIV IN: CPT | Mod: S$GLB,,, | Performed by: DIETITIAN, REGISTERED

## 2024-12-10 PROCEDURE — 3044F HG A1C LEVEL LT 7.0%: CPT | Mod: CPTII,S$GLB,, | Performed by: FAMILY MEDICINE

## 2024-12-10 PROCEDURE — 84443 ASSAY THYROID STIM HORMONE: CPT | Performed by: FAMILY MEDICINE

## 2024-12-10 PROCEDURE — 1160F RVW MEDS BY RX/DR IN RCRD: CPT | Mod: CPTII,S$GLB,, | Performed by: FAMILY MEDICINE

## 2024-12-10 PROCEDURE — 99395 PREV VISIT EST AGE 18-39: CPT | Mod: S$GLB,,, | Performed by: FAMILY MEDICINE

## 2024-12-10 PROCEDURE — 99199 UNLISTED SPECIAL SVC PX/RPRT: CPT | Mod: ,,, | Performed by: DIETITIAN, REGISTERED

## 2024-12-10 PROCEDURE — 1159F MED LIST DOCD IN RCRD: CPT | Mod: CPTII,S$GLB,, | Performed by: FAMILY MEDICINE

## 2024-12-10 PROCEDURE — 85027 COMPLETE CBC AUTOMATED: CPT | Performed by: FAMILY MEDICINE

## 2024-12-10 PROCEDURE — 81479 UNLISTED MOLECULAR PATHOLOGY: CPT | Mod: S$GLB,,, | Performed by: INTERNAL MEDICINE

## 2024-12-10 PROCEDURE — 81003 URINALYSIS AUTO W/O SCOPE: CPT | Performed by: FAMILY MEDICINE

## 2024-12-10 PROCEDURE — 71046 X-RAY EXAM CHEST 2 VIEWS: CPT | Mod: 26,,, | Performed by: RADIOLOGY

## 2024-12-10 PROCEDURE — 80053 COMPREHEN METABOLIC PANEL: CPT | Performed by: FAMILY MEDICINE

## 2024-12-10 PROCEDURE — 83655 ASSAY OF LEAD: CPT | Performed by: FAMILY MEDICINE

## 2024-12-10 PROCEDURE — 99999 PR PBB SHADOW E&M-EST. PATIENT-LVL III: CPT | Mod: PBBFAC,,, | Performed by: FAMILY MEDICINE

## 2024-12-10 PROCEDURE — 3075F SYST BP GE 130 - 139MM HG: CPT | Mod: CPTII,S$GLB,, | Performed by: FAMILY MEDICINE

## 2024-12-10 PROCEDURE — 3008F BODY MASS INDEX DOCD: CPT | Mod: CPTII,S$GLB,, | Performed by: FAMILY MEDICINE

## 2024-12-10 PROCEDURE — 80061 LIPID PANEL: CPT | Performed by: FAMILY MEDICINE

## 2024-12-10 PROCEDURE — 81479 UNLISTED MOLECULAR PATHOLOGY: CPT | Mod: WS | Performed by: FAMILY MEDICINE

## 2024-12-10 PROCEDURE — 3079F DIAST BP 80-89 MM HG: CPT | Mod: CPTII,S$GLB,, | Performed by: FAMILY MEDICINE

## 2024-12-10 PROCEDURE — 93010 ELECTROCARDIOGRAM REPORT: CPT | Mod: ,,, | Performed by: INTERNAL MEDICINE

## 2024-12-10 RX ORDER — KETOCONAZOLE 20 MG/ML
1 SHAMPOO, SUSPENSION TOPICAL
COMMUNITY
Start: 2024-12-09

## 2024-12-10 NOTE — PROGRESS NOTES
Subjective:   Patient ID: Nato Ng is a 39 y.o. male.  Chief Complaint:  Executive Health    Presents for executive health evaluation   Past medical history includes hypertension, prediabetes, hypertriglyceridemia, anxiety, depression, insomnia, lumbar spondylosis, and obesity  Past surgical history includes a lumbar fusion and a complicated fracture repair of right leg/ankle   No current medications   Allergies include penicillin which caused a rash  Social history for 1 pack per day smoking, no alcohol use, no illicit drug use.  Works for [employer].  Single.  Family history father with multiple skin cancers, not melanoma.  Mother healthy.  Specifically no prostate or colon cancer.  Routine health maintenance with includes a tetanus booster in March 2017, a flu vaccine in October 2022, a Prevnar 20 vaccine in September 2022, a Pneumovax 23 vaccine in March 2017, a completed primary COVID vaccine series but no up-to-date booster.  No specific complaints or concerns today    Review of Systems   Constitutional:  Negative for chills, fatigue and fever.   HENT:  Negative for congestion, dental problem, ear discharge, ear pain, postnasal drip, rhinorrhea, sinus pressure, sinus pain, sore throat and trouble swallowing.    Eyes:  Negative for pain, redness and visual disturbance.   Respiratory:  Negative for cough, chest tightness, shortness of breath and wheezing.    Cardiovascular:  Negative for chest pain, palpitations and leg swelling.   Gastrointestinal:  Negative for abdominal pain, constipation, diarrhea, nausea and vomiting.   Endocrine: Negative for polydipsia, polyphagia and polyuria.   Genitourinary:  Negative for difficulty urinating, dysuria, flank pain, frequency, hematuria and urgency.   Musculoskeletal:  Negative for myalgias.   Skin:  Negative for rash.   Neurological:  Negative for dizziness, weakness, light-headedness and headaches.   Hematological:  Negative for adenopathy.    Psychiatric/Behavioral:  Negative for sleep disturbance. The patient is not nervous/anxious.        Objective:   /80   Pulse 91   Temp 97.3 °F (36.3 °C)   Ht 6' (1.829 m)   Wt 131.5 kg (290 lb)   BMI 39.33 kg/m²     Physical Exam  Vitals and nursing note reviewed.   Constitutional:       Appearance: Normal appearance. He is well-developed and normal weight.   HENT:      Right Ear: Tympanic membrane and ear canal normal.      Left Ear: Tympanic membrane and ear canal normal.      Nose: No congestion or rhinorrhea.      Mouth/Throat:      Pharynx: Oropharynx is clear. Uvula midline. No pharyngeal swelling, oropharyngeal exudate or posterior oropharyngeal erythema.   Neck:      Thyroid: Thyromegaly (Left lower lobe) present. No thyroid mass (Left lower lobe) or thyroid tenderness.   Cardiovascular:      Rate and Rhythm: Normal rate and regular rhythm.      Heart sounds: Normal heart sounds.   Pulmonary:      Effort: Pulmonary effort is normal.      Breath sounds: Normal breath sounds.   Abdominal:      General: There is no distension.      Palpations: Abdomen is soft.      Tenderness: There is no abdominal tenderness.   Lymphadenopathy:      Cervical: No cervical adenopathy.   Skin:     Findings: No rash.   Psychiatric:         Mood and Affect: Mood and affect normal.     CBC and CMP normal   Lipid panel, A1c, TSH, PSA pending   Urinalysis normal   Lead level pending     EKG normal sinus rhythm, incomplete right bundle-branch block, and possible anterior infarct age undetermined but likely normal due to significant baseline artifact on tracing.  Pulmonary function tests normal    Chest x-ray with no acute cardiopulmonary process identified.  Concern for mass effect with right word tracheal deviation due to possible left thyroid lobe mass    Assessment:       ICD-10-CM ICD-9-CM   1. Routine general medical examination at a health care facility  Z00.00 V70.0   2. Primary hypertension  I10 401.9   3.  Prediabetes  R73.03 790.29   4. Severe obesity (BMI 35.0-39.9) with comorbidity  E66.01 278.01   5. Hypertriglyceridemia  E78.1 272.1   6. Former smoker  Z87.891 V15.82   7. Thyroid mass  E07.9 246.9   8. Tracheal deviation  J39.8 519.19   9. Abnormal CXR (chest x-ray)  R93.89 793.2     Plan:   Routine general medical examination at a health care facility  Blood pressure borderline.  BMI 39.    Send full executive Health letter when all test resulted.    Colon cancer screening at 47 years old   Tetanus booster up-to-date   Pneumonia vaccines up-to-date   Declines COVID vaccine   Declines flu vaccine     Primary hypertension  Borderline control   Advised to take readings daily at home   If average remains greater than 140/90 should return to clinic to start appropriate antihypertensive medication     Prediabetes  Asymptomatic   Check A1c   If less than 6.5%, okay remain off medication     Severe obesity (BMI 35.0-39.9) with comorbidity  Discussed increased BMI, Increased health risks, Need for weight loss, Lifestyle modifications.  Specifically discussed need to avoid any increased weight and obtain BMI greater than 40    Hypertriglyceridemia  Asymptomatic   Check lipid panel   Start medication if indicated     Former smoker  Continues to remain smoke free     Thyroid mass  Tracheal deviation  Abnormal CXR (chest x-ray)  -     CT Neck Chest Without Contrast (XPD); Future; Expected date: 12/10/2024  Left thyroid enlargement   Abnormal x-ray with tracheal deviation   Asymptomatic   Recommend CT imaging of neck with additional evaluation and treatment as indicated    Patient expresses agreement understanding to the above plan     Return to clinic 1 year for executive Health evaluation or sooner as needed

## 2024-12-10 NOTE — LETTER
December 10, 2024    Nato Ng  [address] [facility] [place] [address]             [address]  [address]  [place] [address]  Phone: [phone]  Fax: [phone] Dear Mr. Ng,    On December 10, 2024,, it was a pleasure to see you again you and perform your Executive Health evaluation. At the time of this visit, you are 39 years old. You denied any specific complaints or concerns during today's visit.      YOUR PAST MEDICAL HISTORY includes hypertension, prediabetes, hypertriglyceridemia, anxiety, depression, insomnia, lumbar spondylosis, and obesity.    YOUR PAST SURGICAL HISTORY includes a lumbar fusion and a complicated fracture repair of your right leg/ankle .    You do not take ANY REGULAR MEDICATIONS.    YOUR KNOWN DRUG ALLERGIES include penicillin which caused a rash.    YOUR SOCIAL HISTORY includes employment by the [employer]. You are  with no children. You quit smoking in 2019. You denied any current alcohol or illicit drug use..    YOUR FAMILY HISTORY includes your father, alive, with multiple skin cancers, but no melanoma. Your mother, alive, with no known medical problems. No known colon or prostate cancer.    YOUR ROUTINE HEALTH MAINTENANCE includes includes a tetanus booster in March 2017, a flu vaccine in October 2022, a Prevnar 20 vaccine in September 2022, a Pneumovax 23 vaccine in March 2017, a completed primary COVID vaccine series but no up-to-date booster.    PHYSICAL EXAMINATION: You are 6 ft tall tall, weighing 290 pounds with a body mass index of 39. This places you in the severely obese category. Your blood pressure is 138/80. Your heart rate is 91 beats per minute. All of these are normal values. Other than your Obesity, your physical examination was remarkable for nontender enlargement of your left thyroid gland.      YOUR BLOOD WORK AND ADDITIONAL TESTS: Reveal normal white cell counts, red cell counts, and  platelet levels. You are not Anemic. Your glucose, kidney, liver, and electrolyte tests are normal. Your total cholesterol is 200. Your triglycerides are 274. Your HDL is 34. Your LDL is 111.  Based on these numbers, your 10-year risk of heart attack or stroke is <5%. Your hemoglobin A1c is 5.5%, which is in a normal range. Your TSH is 1.913, which is in a normal range. You do not have an active Thyroid problem. Your serum PSA is 0.57, which is in a normal range. You do not have any suspicions for Prostate cancer.  Your urinalysis is normal.  Your serum lead level was normal.      Your EKG shows EKG shows a normal sinus rhythm with incomplete run bundle-branch block.  Other abnormalities are likely related to baseline artifact..    Your CXR shows no acute or chronic cardiopulmonary abnormality.  Incidental findings includes a mass effect with rightward tracheal deviation likely related to a left thyroid lobe mass..    Your Pulmonary function tests show normal spirometry..    In summary, you are overall in good health. You are Severely Obese. This puts you at increased risk for medical problems in the future. Lifestyle modifications to promote weight loss, like regular physical activity and decreased caloric intake, are encouraged. Your 10-year risk of heart attack or stroke is <5%. Daily aspirin or cholesterol medications are not recommended. Your blood pressure is questionably controlled.  Checking your blood pressure on a daily basis is recommended. Should the average readings greater than 140/90, please return to clinic to start inappropriate blood pressure medication. Your prediabetes is  stable and well controlled.  For your thyroid enlargement and abnormal x-ray, a CT scan of your neck was ordered today.    Based on the [place] Preventive Task Force recommendations, you should receive you should obtain the new COVID booster.  You should start screening for Colon cancer at 47 years old.     It was a  pleasure to see you again you and perform this Executive Health evaluation. If I can be of any further assistance, or if you have any additional questions or concerns, please do not hesitate to let me know.    Sincerely,      Quinton Carrasco MD, FAAFP

## 2024-12-10 NOTE — PROGRESS NOTES
"Nutrition Assessment  Session Time:  30 minutes      Client name:  Nato Ng  :  1985  Age:  39 y.o.  Gender:  male    Client states:  [employer] employee.  Present for nutrition counseling as part of his annual physical.  Last nutrition consult: 2022      Reports terrible diet.   Reports snacking often out of boredom.  Variety of choices: cookies, fruits, vegetables, cashews  Meal choices are relatively healthy.     States water intake could be improved.    Limited physical activity.         Anthropometrics  Height:  72"     Weight:  290 lbs  BMI:  39.33  % Body Fat:  n/a    Clinical Signs/Symptoms  N/V/D:  none noted  Appetite:  good       Past Medical History:   Diagnosis Date    Bulging lumbar disc     Fatigue 2018    Insomnia 2018    Upper respiratory tract infection 2021       Past Surgical History:   Procedure Laterality Date    FRACTURE SURGERY      LEG SURGERY      SPINAL FUSION  2019       Medications    has a current medication list which includes the following prescription(s): ketoconazole.    Vitamins, Minerals, and/or Supplements:  not discussed     Food/Medication Interactions:  Reviewed     Food Allergies or Intolerances:  NKFA noted in chart     Social History    Marital status:    Employment:  yes    Social History     Tobacco Use    Smoking status: Former     Current packs/day: 0.50     Average packs/day: 0.5 packs/day for 15.9 years (8.0 ttl pk-yrs)     Types: Vaping with nicotine, Cigarettes     Start date: 2009    Smokeless tobacco: Current     Types: Snuff    Tobacco comments:     currently quitting   Substance Use Topics    Alcohol use: No        Lab Reports   Sodium   Date Value Ref Range Status   2023 139 136 - 145 mmol/L Final     Potassium   Date Value Ref Range Status   2023 4.6 3.5 - 5.1 mmol/L Final     Chloride   Date Value Ref Range Status   2023 105 95 - 110 mmol/L Final     CO2   Date Value Ref " Range Status   04/03/2023 22 (L) 23 - 29 mmol/L Final     Glucose   Date Value Ref Range Status   04/03/2023 76 70 - 110 mg/dL Final     BUN   Date Value Ref Range Status   04/03/2023 13 6 - 20 mg/dL Final     Creatinine   Date Value Ref Range Status   04/03/2023 1.1 0.5 - 1.4 mg/dL Final     Calcium   Date Value Ref Range Status   04/03/2023 9.2 8.7 - 10.5 mg/dL Final     Total Protein   Date Value Ref Range Status   04/03/2023 7.2 6.0 - 8.4 g/dL Final     Albumin   Date Value Ref Range Status   04/03/2023 3.7 3.5 - 5.2 g/dL Final     Total Bilirubin   Date Value Ref Range Status   04/03/2023 0.5 0.1 - 1.0 mg/dL Final     Comment:     For infants and newborns, interpretation of results should be based  on gestational age, weight and in agreement with clinical  observations.    Premature Infant recommended reference ranges:  Up to 24 hours.............<8.0 mg/dL  Up to 48 hours............<12.0 mg/dL  3-5 days..................<15.0 mg/dL  6-29 days.................<15.0 mg/dL       Alkaline Phosphatase   Date Value Ref Range Status   04/03/2023 93 55 - 135 U/L Final     AST   Date Value Ref Range Status   04/03/2023 28 10 - 40 U/L Final     ALT   Date Value Ref Range Status   04/03/2023 53 (H) 10 - 44 U/L Final     Anion Gap   Date Value Ref Range Status   04/03/2023 12 8 - 16 mmol/L Final     eGFR if    Date Value Ref Range Status   08/25/2021 >60 >60 mL/min/1.73 m^2 Final     eGFR if non    Date Value Ref Range Status   08/25/2021 >60 >60 mL/min/1.73 m^2 Final     Comment:     Calculation used to obtain the estimated glomerular filtration  rate (eGFR) is the CKD-EPI equation.         Lab Results   Component Value Date    WBC 5.68 04/03/2023    HGB 14.9 04/03/2023    HCT 47.0 04/03/2023    MCV 87 04/03/2023     04/03/2023        Lab Results   Component Value Date    CHOL 168 04/03/2023     Lab Results   Component Value Date    HDL 30 (L) 04/03/2023     Lab Results   Component  Value Date    LDLCALC 116.8 04/03/2023     Lab Results   Component Value Date    TRIG 106 04/03/2023     Lab Results   Component Value Date    CHOLHDL 17.9 (L) 04/03/2023     Lab Results   Component Value Date    HGBA1C 5.5 01/22/2024     BP Readings from Last 1 Encounters:   01/22/24 122/86       Food History  reviewed    Exercise History:  reviewed    Cultural/Spiritual/Personal Preferences:  None identified    Support System:  family/friends    State of Change:  Contemplation    Barriers to Change:  none    Diagnosis    obesity related to excess energy intake as evidenced by BMI 39, excess snacking.    Intervention    RMR (Method:  [place]. Jeor):  2283 kcal  Activity Factor:  1.2    MARK:  2739 - 500 = 2239 kcal    Goals:  1.  Include fiber + protein with each meal and snack  2.  Increase daily water intake.       Nutrition Education  The following education was provided to the patient:  Discussed weight management.  Suggested dietary modifications based on current dietary behaviors and individual food preferences.  Discussed recommended fiber intake and food sources of such.    Patient verbalized understanding of nutrition education and recommendations received.    Handouts Provided  Balanced Snacking     Monitoring/Evaluation    Monitor the following:  Weight  BMI  Caloric intake  Labs:  lipid panel, HgbA1c    Follow Up Plan:  Communication with referring healthcare provider is unnecessary at this time as patient presented as part of annual wellness exam.  However, will follow up with patient in 1-2 years.

## 2024-12-12 LAB
CITY: NORMAL
COUNTY: NORMAL
GUARDIAN FIRST NAME: NORMAL
GUARDIAN LAST NAME: NORMAL
LEAD BLD-MCNC: <1 MCG/DL
PHONE #: NORMAL
POSTAL CODE: NORMAL
RACE: NORMAL
STATE OF RESIDENCE: NORMAL
STREET ADDRESS: NORMAL

## 2024-12-17 ENCOUNTER — PATIENT MESSAGE (OUTPATIENT)
Dept: INTERNAL MEDICINE | Facility: CLINIC | Age: 39
End: 2024-12-17
Payer: COMMERCIAL

## 2024-12-18 NOTE — TELEPHONE ENCOUNTER
Please print out my note from 12/10/2024 in addition to most recent chest x-ray and fax/upload them to number and website on attachment

## 2024-12-30 DIAGNOSIS — E07.9 THYROID MASS: Primary | ICD-10-CM

## 2024-12-30 DIAGNOSIS — R93.89 ABNORMAL CXR (CHEST X-RAY): ICD-10-CM

## 2024-12-30 DIAGNOSIS — J39.8 TRACHEAL DEVIATION: ICD-10-CM

## 2025-01-03 ENCOUNTER — APPOINTMENT (OUTPATIENT)
Dept: RADIOLOGY | Facility: HOSPITAL | Age: 40
End: 2025-01-03
Attending: FAMILY MEDICINE
Payer: COMMERCIAL

## 2025-01-03 DIAGNOSIS — E07.9 THYROID MASS: ICD-10-CM

## 2025-01-03 DIAGNOSIS — J39.8 TRACHEAL DEVIATION: ICD-10-CM

## 2025-01-03 DIAGNOSIS — R93.89 ABNORMAL CXR (CHEST X-RAY): ICD-10-CM

## 2025-01-03 PROCEDURE — 76536 US EXAM OF HEAD AND NECK: CPT | Mod: 26,,, | Performed by: RADIOLOGY

## 2025-01-03 PROCEDURE — 76536 US EXAM OF HEAD AND NECK: CPT | Mod: TC,PO

## 2025-01-06 DIAGNOSIS — E04.1 THYROID NODULE: Primary | ICD-10-CM

## 2025-01-13 ENCOUNTER — OFFICE VISIT (OUTPATIENT)
Dept: OTOLARYNGOLOGY | Facility: CLINIC | Age: 40
End: 2025-01-13
Payer: COMMERCIAL

## 2025-01-13 VITALS — BODY MASS INDEX: 39.33 KG/M2 | HEIGHT: 72 IN

## 2025-01-13 DIAGNOSIS — K21.9 GASTROESOPHAGEAL REFLUX DISEASE, UNSPECIFIED WHETHER ESOPHAGITIS PRESENT: ICD-10-CM

## 2025-01-13 DIAGNOSIS — E04.9 THYROID GOITER: Primary | ICD-10-CM

## 2025-01-13 DIAGNOSIS — E04.1 THYROID NODULE: ICD-10-CM

## 2025-01-13 DIAGNOSIS — K21.9 LARYNGOPHARYNGEAL REFLUX (LPR): ICD-10-CM

## 2025-01-13 PROCEDURE — 99204 OFFICE O/P NEW MOD 45 MIN: CPT | Mod: 25,S$GLB,, | Performed by: STUDENT IN AN ORGANIZED HEALTH CARE EDUCATION/TRAINING PROGRAM

## 2025-01-13 PROCEDURE — 3008F BODY MASS INDEX DOCD: CPT | Mod: CPTII,S$GLB,, | Performed by: STUDENT IN AN ORGANIZED HEALTH CARE EDUCATION/TRAINING PROGRAM

## 2025-01-13 PROCEDURE — 1159F MED LIST DOCD IN RCRD: CPT | Mod: CPTII,S$GLB,, | Performed by: STUDENT IN AN ORGANIZED HEALTH CARE EDUCATION/TRAINING PROGRAM

## 2025-01-13 PROCEDURE — 31575 DIAGNOSTIC LARYNGOSCOPY: CPT | Mod: S$GLB,,, | Performed by: STUDENT IN AN ORGANIZED HEALTH CARE EDUCATION/TRAINING PROGRAM

## 2025-01-13 PROCEDURE — 99999 PR PBB SHADOW E&M-EST. PATIENT-LVL III: CPT | Mod: PBBFAC,,, | Performed by: STUDENT IN AN ORGANIZED HEALTH CARE EDUCATION/TRAINING PROGRAM

## 2025-01-13 RX ORDER — OMEPRAZOLE 20 MG/1
20 CAPSULE, DELAYED RELEASE ORAL DAILY
Qty: 31 EACH | Refills: 11 | Status: SHIPPED | OUTPATIENT
Start: 2025-01-13 | End: 2026-01-13

## 2025-01-13 NOTE — H&P (VIEW-ONLY)
Chief complaint:    Chief Complaint   Patient presents with    Referral     Referred by pcp for mass of L thyroid         Referring Provider:  Quinton Carrasco Md  [address] [place] [address]    History of present illness:     Mr. Ng is a 39 y.o.  presenting for evaluation of thyroid nodule/s.     He states that he has not noted a mass in his neck.       Denies pain.       He denies sore throat, dysphagia, otalgia, voice change, hemoptysis.      Voice has been cracking over the last few months.     No family/personal history of thyroid cancer.     No personal history of head and neck cancer/head and neck radiation.    No personal history of hypo- or hyperthyroidism.    Has been xicam.     He does have severe heart burn. Worse recently.        History      Past Medical History:   Past Medical History:   Diagnosis Date    Bulging lumbar disc     Fatigue 4/6/2018    Insomnia 4/6/2018    Upper respiratory tract infection 8/20/2021    .          Past Surgical History:  Past Surgical History:   Procedure Laterality Date    FRACTURE SURGERY  1997    LEG SURGERY      SPINAL FUSION  11/2019            Medications:  He  has a current medication list which includes the following prescription(s): ketoconazole.      Allergies:   Review of patient's allergies indicates:   Allergen Reactions    Penicillins          Family history: family history includes Arthritis in his maternal grandfather; Diabetes in his maternal grandfather.             Social History          Alcohol use:  reports no history of alcohol use.            Tobacco:  reports that he has quit smoking. His smoking use included vaping with nicotine and cigarettes. He started smoking about 16 years ago. He has a 8 pack-year smoking history. His smokeless tobacco use includes snuff.           Physical Examination     Ht 6' (1.829 m)   BMI 39.33 kg/m²      The head and neck examination included the following elements which were found to be within  normal limits unless otherwise noted below: general appearance, mood and affect, quality of voice, inspection of head and face, cranial nerve examination, external ears, ear canals, and tympanic membranes, external nose, nasal mucosa, septum and turbinates, lips, oral cavity,oropharynx, larynx, pharyngeal walls and pyriform sinuses, cervical lymphatics,  neck and thyroid bed, pupils and extraocular motion, carotid pulses bilaterally, temporomandibular joint, respiratory assessment for effort, stridor, lung symmetry, and retractions, rashes or lesions on head or neck, and facial nerve function.     Salient findings:        normal voice      Palpable very large left thyroid nodule     no enlarged cervical adenopathy      Data reviewed      Pathology      none    Laboratory    Lab Results   Component Value Date    TSH 1.973 12/10/2024        Lab Results   Component Value Date    CALCIUM 9.7 12/10/2024        Imaging    I have independently reviewed the following imaging with the findings noted below:      US thyroid 1/3/25    8 cm, solid, essentially isoechoic and mostly solid nodule on the left  No significant nodules on the right       Procedure -Transnasal fiberoptic laryngoscopy     Surgeon: Jono Young M.D. .      Anesthesia: topical 0.05% oxymetazoline with 4% lidocaine      Complications: None.     Description of Procedure: With the patient in the sitting position, topical lidocaine and oxymetazoline was applied to the nose. The scope was passed through the nose. Examination was carried out of the nose, nasopharynx, oropharynx, hypopharynx, and larynx with findings as noted below. Scope was removed. The patient tolerated the procedure well.      Findings: No masses or lesions in the nose, nasopharynx, oropharynx, hypopharynx, or larynx. Vocal fold abduction and adduction is normal. No pooling of secretions in the piriform sinuses, penetration, or aspiration.         Impression/Plan     1. Thyroid goiter    2.  Thyroid nodule           I have recommended FNA of left nodule. We discussed that in the event of carcinoma, we would plan for total thyroidectomy. If it is benign or atypical, lobectomy would still be warranted due to significant size and risk of false diagnosis. Due its size I recommend also having CT neck/chest prior to surgery for surgical planning and assessment of substernal extension.     Depending on results of the FNA management may include observation, thyroid lobectomy with staged total thyroidectomy, or total thyroidectomy.     We discussed risks of thyroidectomy such as numbness, poor cosmetic outcome, injury or weakness of the recurrent laryngeal nerve with resultant hoarseness which may be permanent, poor function of the parathyroid glands with need for calcium supplementation which may be permanent, need for thyroid hormone replacement, discovery of cancer with need for further surgery or other treatments, among other risks.      He will f/u after workup for surgical planning.       The patient has evidence of moderate to severelaryngopharyngeal reflux (LPR).  We discussed behavioral modifications such as raising the head of the bed, avoiding tight clothing or belts, avoiding eating within 2 hours of laying down, and weight loss. We also discussed dietary changes that can improve acid reflux symptoms, including, but not limited to avoidance of caffeine, peppermints, alcohol, greasy and fried foods, tomato-based sauces, and tobacco. H2 blockers (ie. Pepcid) or antacids (ie. Tums) can help as well. However, for persisting chronic or daily symptoms, a trial of proton pump inhibitors (ie. Omeprazole) is recommended. This is to be taken 30 minutes prior to breakfast and consistently for at least 3-4 weeks before you may notice improvement in symptoms. The addition of alginate therapy (Reflux Raft or Reflux Gourmet) can be an excellent adjuvant natural therapy as well. We discussed that if symptoms do not  improve in 2-3 months, or other concerning symptoms develop (odynophagia, dysphagia, weight loss or GI bleeding) they should alert our office or return for a repeat exam.         Jono Young MD  [facility]   [facility]  [id number] [place] [address].  ZEESHAN Mccabe [id number]  P: [phone]  F: [phone]

## 2025-01-13 NOTE — PROGRESS NOTES
Chief complaint:    Chief Complaint   Patient presents with    Referral     Referred by pcp for mass of L thyroid         Referring Provider:  Quinton Carrasco Md  [address] [place] [address]    History of present illness:     Mr. Ng is a 39 y.o.  presenting for evaluation of thyroid nodule/s.     He states that he has not noted a mass in his neck.       Denies pain.       He denies sore throat, dysphagia, otalgia, voice change, hemoptysis.      Voice has been cracking over the last few months.     No family/personal history of thyroid cancer.     No personal history of head and neck cancer/head and neck radiation.    No personal history of hypo- or hyperthyroidism.    Has been xicam.     He does have severe heart burn. Worse recently.        History      Past Medical History:   Past Medical History:   Diagnosis Date    Bulging lumbar disc     Fatigue 4/6/2018    Insomnia 4/6/2018    Upper respiratory tract infection 8/20/2021    .          Past Surgical History:  Past Surgical History:   Procedure Laterality Date    FRACTURE SURGERY  1997    LEG SURGERY      SPINAL FUSION  11/2019            Medications:  He  has a current medication list which includes the following prescription(s): ketoconazole.      Allergies:   Review of patient's allergies indicates:   Allergen Reactions    Penicillins          Family history: family history includes Arthritis in his maternal grandfather; Diabetes in his maternal grandfather.             Social History          Alcohol use:  reports no history of alcohol use.            Tobacco:  reports that he has quit smoking. His smoking use included vaping with nicotine and cigarettes. He started smoking about 16 years ago. He has a 8 pack-year smoking history. His smokeless tobacco use includes snuff.           Physical Examination     Ht 6' (1.829 m)   BMI 39.33 kg/m²      The head and neck examination included the following elements which were found to be within  normal limits unless otherwise noted below: general appearance, mood and affect, quality of voice, inspection of head and face, cranial nerve examination, external ears, ear canals, and tympanic membranes, external nose, nasal mucosa, septum and turbinates, lips, oral cavity,oropharynx, larynx, pharyngeal walls and pyriform sinuses, cervical lymphatics,  neck and thyroid bed, pupils and extraocular motion, carotid pulses bilaterally, temporomandibular joint, respiratory assessment for effort, stridor, lung symmetry, and retractions, rashes or lesions on head or neck, and facial nerve function.     Salient findings:        normal voice      Palpable very large left thyroid nodule     no enlarged cervical adenopathy      Data reviewed      Pathology      none    Laboratory    Lab Results   Component Value Date    TSH 1.973 12/10/2024        Lab Results   Component Value Date    CALCIUM 9.7 12/10/2024        Imaging    I have independently reviewed the following imaging with the findings noted below:      US thyroid 1/3/25    8 cm, solid, essentially isoechoic and mostly solid nodule on the left  No significant nodules on the right       Procedure -Transnasal fiberoptic laryngoscopy     Surgeon: Jono Young M.D. .      Anesthesia: topical 0.05% oxymetazoline with 4% lidocaine      Complications: None.     Description of Procedure: With the patient in the sitting position, topical lidocaine and oxymetazoline was applied to the nose. The scope was passed through the nose. Examination was carried out of the nose, nasopharynx, oropharynx, hypopharynx, and larynx with findings as noted below. Scope was removed. The patient tolerated the procedure well.      Findings: No masses or lesions in the nose, nasopharynx, oropharynx, hypopharynx, or larynx. Vocal fold abduction and adduction is normal. No pooling of secretions in the piriform sinuses, penetration, or aspiration.         Impression/Plan     1. Thyroid goiter    2.  Thyroid nodule           I have recommended FNA of left nodule. We discussed that in the event of carcinoma, we would plan for total thyroidectomy. If it is benign or atypical, lobectomy would still be warranted due to significant size and risk of false diagnosis. Due its size I recommend also having CT neck/chest prior to surgery for surgical planning and assessment of substernal extension.     Depending on results of the FNA management may include observation, thyroid lobectomy with staged total thyroidectomy, or total thyroidectomy.     We discussed risks of thyroidectomy such as numbness, poor cosmetic outcome, injury or weakness of the recurrent laryngeal nerve with resultant hoarseness which may be permanent, poor function of the parathyroid glands with need for calcium supplementation which may be permanent, need for thyroid hormone replacement, discovery of cancer with need for further surgery or other treatments, among other risks.      He will f/u after workup for surgical planning.       The patient has evidence of moderate to severelaryngopharyngeal reflux (LPR).  We discussed behavioral modifications such as raising the head of the bed, avoiding tight clothing or belts, avoiding eating within 2 hours of laying down, and weight loss. We also discussed dietary changes that can improve acid reflux symptoms, including, but not limited to avoidance of caffeine, peppermints, alcohol, greasy and fried foods, tomato-based sauces, and tobacco. H2 blockers (ie. Pepcid) or antacids (ie. Tums) can help as well. However, for persisting chronic or daily symptoms, a trial of proton pump inhibitors (ie. Omeprazole) is recommended. This is to be taken 30 minutes prior to breakfast and consistently for at least 3-4 weeks before you may notice improvement in symptoms. The addition of alginate therapy (Reflux Raft or Reflux Gourmet) can be an excellent adjuvant natural therapy as well. We discussed that if symptoms do not  improve in 2-3 months, or other concerning symptoms develop (odynophagia, dysphagia, weight loss or GI bleeding) they should alert our office or return for a repeat exam.         Jono Young MD  [facility]   [facility]  [id number] [place] [address].  ZEESHAN Mccabe [id number]  P: [phone]  F: [phone]

## 2025-01-16 ENCOUNTER — PATIENT MESSAGE (OUTPATIENT)
Dept: INTERNAL MEDICINE | Facility: CLINIC | Age: 40
End: 2025-01-16
Payer: COMMERCIAL

## 2025-01-16 DIAGNOSIS — F51.02 ADJUSTMENT INSOMNIA: Primary | ICD-10-CM

## 2025-01-16 RX ORDER — HYDROXYZINE PAMOATE 25 MG/1
25-50 CAPSULE ORAL NIGHTLY PRN
Qty: 60 CAPSULE | Refills: 0 | Status: SHIPPED | OUTPATIENT
Start: 2025-01-16

## 2025-01-27 ENCOUNTER — HOSPITAL ENCOUNTER (OUTPATIENT)
Dept: RADIOLOGY | Facility: HOSPITAL | Age: 40
Discharge: HOME | End: 2025-01-27
Attending: FAMILY MEDICINE
Payer: COMMERCIAL

## 2025-01-27 ENCOUNTER — OCHSNER VIRTUAL EMERGENCY DEPARTMENT (OUTPATIENT)
Facility: CLINIC | Age: 40
End: 2025-01-27
Payer: COMMERCIAL

## 2025-01-27 ENCOUNTER — TELEPHONE (OUTPATIENT)
Dept: OTOLARYNGOLOGY | Facility: CLINIC | Age: 40
End: 2025-01-27
Payer: COMMERCIAL

## 2025-01-27 ENCOUNTER — NURSE TRIAGE (OUTPATIENT)
Dept: ADMINISTRATIVE | Facility: CLINIC | Age: 40
End: 2025-01-27
Payer: COMMERCIAL

## 2025-01-27 ENCOUNTER — HOSPITAL ENCOUNTER (OUTPATIENT)
Dept: RADIOLOGY | Facility: HOSPITAL | Age: 40
Discharge: HOME | End: 2025-01-27
Attending: STUDENT IN AN ORGANIZED HEALTH CARE EDUCATION/TRAINING PROGRAM
Payer: COMMERCIAL

## 2025-01-27 DIAGNOSIS — E04.1 THYROID NODULE: ICD-10-CM

## 2025-01-27 DIAGNOSIS — M79.89 ARM SWELLING: Primary | ICD-10-CM

## 2025-01-27 DIAGNOSIS — E04.9 THYROID GOITER: ICD-10-CM

## 2025-01-27 PROCEDURE — 71260 CT THORAX DX C+: CPT | Mod: TC

## 2025-01-27 PROCEDURE — 25500020 PHARM REV CODE 255: Performed by: STUDENT IN AN ORGANIZED HEALTH CARE EDUCATION/TRAINING PROGRAM

## 2025-01-27 PROCEDURE — 71260 CT THORAX DX C+: CPT | Mod: 26,,, | Performed by: RADIOLOGY

## 2025-01-27 PROCEDURE — 10005 FNA BX W/US GDN 1ST LES: CPT

## 2025-01-27 PROCEDURE — 88173 CYTOPATH EVAL FNA REPORT: CPT | Performed by: STUDENT IN AN ORGANIZED HEALTH CARE EDUCATION/TRAINING PROGRAM

## 2025-01-27 PROCEDURE — 88173 CYTOPATH EVAL FNA REPORT: CPT | Mod: 26,,, | Performed by: STUDENT IN AN ORGANIZED HEALTH CARE EDUCATION/TRAINING PROGRAM

## 2025-01-27 PROCEDURE — 70491 CT SOFT TISSUE NECK W/DYE: CPT | Mod: 26,,, | Performed by: RADIOLOGY

## 2025-01-27 PROCEDURE — 10005 FNA BX W/US GDN 1ST LES: CPT | Mod: ,,, | Performed by: RADIOLOGY

## 2025-01-27 RX ADMIN — IOHEXOL 100 ML: 350 INJECTION, SOLUTION INTRAVENOUS at 01:01

## 2025-01-27 NOTE — PLAN OF CARE-OVED
"[facility] Plan of Care Note                                [facility] Plan of Care Note    Referral source: Nurse On-Call      Discussed patient with: Nurse On Call      Reason for consult:  swelling  Patient had a CT scan done today with contrast. Nurse inserted IV and told him if he felt pain when in the CT to let her know. IV was removed and replaced after pt felt pain upon administering contrast. Now, left arm where original IV site was from elbow to shoulder is double the size as the other arm. No pain. It's very tight, looks like skin is "going to burst." Has range of motion up to around his shoulder and then pt cannot move above that because the skin is so swollen. IV was at the AC on that arm. No bleeding or seeping fluid from old IV site. No fever. No chest pain or SOB.     Medical Record Review: reviewed      Disposition recommended: to ED for eval      Additional Recommendations: none      Elvia Smith MD        "

## 2025-01-27 NOTE — TELEPHONE ENCOUNTER
"Pts wife calling. He had a CT scan done today with contrast. Nurse inserted IV and told him if he felt pain when in the CT to let her know. IV was removed and replaced after pt felt pain upon administering contrast. Now, left arm where original IV site was from elbow to shoulder is double the size as the other arm. No pain. It's very tight, looks like skin is "going to burst." Has range of motion up to around his shoulder and then pt cannot move above that because the skin is so swollen. IV was at the AC on that arm. No bleeding or seeping fluid from old IV site. No fever. No chest pain or SOB.    Dispo- go to Ed/UC now or pcp triage. Referred to [facility]. Dr. Smith advised ED for US to r/o serious complications. Caller advised, plans to bring pt to [facility]. [facility] aware.   Reason for Disposition   Patient sounds very sick or weak to the triager    Additional Information   Negative: SEVERE difficulty breathing (e.g., struggling for each breath, speaks in single words)   Negative: Shock suspected (e.g., cold/pale/clammy skin, too weak to stand, low BP, rapid pulse)   Negative: Cracked or broken central line (e.g., Broviac, Angel, Port) or PICC line   Negative: Sounds like a life-threatening emergency to the triager   Negative: [1] Chest pain AND [2] has central line (e.g., Broviac, Angel, Port) or PICC line   Negative: Moderate bleeding around IV site  (Exception: Mild bleeding that stops quickly with direct pressure.)   Negative: [1] Difficulty breathing AND [2] has central line (e.g., Broviac, Angel, Port) or PICC line   Negative: [1] Chest or neck swelling AND [2] has a central or PICC line  (Exception: Mild puffiness or swelling just around IV site.)   Negative: [1] Arm or leg swelling AND [2] has a central or PICC line  (Exception: Mild puffiness or swelling just around IV site.)   Negative: Fever > 100.4 F (38.0 C)    Protocols used: IV Site and Other Symptoms-A-AH    "

## 2025-01-27 NOTE — TELEPHONE ENCOUNTER
----- Message from Jono Young MD sent at 1/27/2025  2:42 PM CST -----  Please let him know that due to the size and the depth of his thyroid below the collar bone, I would like him to see Dr. Lua to discuss surgical planning. Thanks!

## 2025-01-27 NOTE — DISCHARGE SUMMARY
[facility])  Discharge Note  Short Stay    US FNA Thyroid, 1st Lesion      OUTCOME: Patient tolerated treatment/procedure well without complication and is now ready for discharge.    DISPOSITION: Home or Self Care    FINAL DIAGNOSIS:  <principal problem not specified>    FOLLOWUP: In clinic    DISCHARGE INSTRUCTIONS:  No discharge procedures on file.     TIME SPENT ON DISCHARGE: 15 minutes    Pre Op Diagnosis: left thyroid nodule     Post Op Diagnosis: same     Procedure:  fna     Procedure performed by: Bran DARNELL, Naldo DONALDSON     Written Informed Consent Obtained: Yes     Specimen Removed:  yes     Estimated Blood Loss:  minimal     Findings: Local anesthesia     Sedation:  no     The patient tolerated the procedure well and there were no complications.      Disposition:  F/U in clinic or with ordering physician    Discharge instructions:  Light activity for 24 hours.  Remove band aid in 24 hours.  No baths (showers are appropriate).      Sterile technique was performed in the left neck, lidocaine was used as a local anesthetic.  Multiple samples taken percutaneously from the left thyroid nodule.  Pt tolerated the procedure well without immediate complications.  Please see radiologist report for details. F/u with PCP and/or ordering physician.

## 2025-01-28 ENCOUNTER — TELEPHONE (OUTPATIENT)
Facility: OTHER | Age: 40
End: 2025-01-28
Payer: COMMERCIAL

## 2025-01-28 NOTE — TELEPHONE ENCOUNTER
"Patient spoke with [facility] RN on 1/27/2025 with complaint of: "Pts wife calling. He had a CT scan done today with contrast. Nurse inserted IV and told him if he felt pain when in the CT to let her know. IV was removed and replaced after pt felt pain upon administering contrast. Now, left arm where original IV site was from elbow to shoulder is double the size as the other arm. No pain. It's very tight, looks like skin is "going to burst." Has range of motion up to around his shoulder and then pt cannot move above that because the skin is so swollen. IV was at the AC on that arm. No bleeding or seeping fluid from old IV site. No fever. No chest pain or SOB."    [facility] RN consulted with [facility] provider, Dr. Smith, and disposition recommended was emergency department.  No emergency room encounter noted.  Spoke with patient to see if he received the care he was needing.  Patient stated he went to the emergency room but the wait time was too long, so he left and did not get seen.  Patient declined PCP appointment.    "

## 2025-01-29 LAB
FINAL PATHOLOGIC DIAGNOSIS: ABNORMAL
Lab: ABNORMAL

## 2025-01-30 ENCOUNTER — PATIENT MESSAGE (OUTPATIENT)
Dept: OTOLARYNGOLOGY | Facility: CLINIC | Age: 40
End: 2025-01-30
Payer: COMMERCIAL

## 2025-02-05 ENCOUNTER — OFFICE VISIT (OUTPATIENT)
Dept: OTOLARYNGOLOGY | Facility: CLINIC | Age: 40
End: 2025-02-05
Payer: COMMERCIAL

## 2025-02-05 VITALS — BODY MASS INDEX: 39.33 KG/M2 | HEIGHT: 72 IN

## 2025-02-05 DIAGNOSIS — E04.9 THYROID GOITER: Primary | ICD-10-CM

## 2025-02-05 DIAGNOSIS — E04.9 SUBSTERNAL GOITER: ICD-10-CM

## 2025-02-05 PROCEDURE — 1160F RVW MEDS BY RX/DR IN RCRD: CPT | Mod: CPTII,S$GLB,, | Performed by: OTOLARYNGOLOGY

## 2025-02-05 PROCEDURE — 99214 OFFICE O/P EST MOD 30 MIN: CPT | Mod: S$GLB,,, | Performed by: OTOLARYNGOLOGY

## 2025-02-05 PROCEDURE — 99999 PR PBB SHADOW E&M-EST. PATIENT-LVL III: CPT | Mod: PBBFAC,,, | Performed by: OTOLARYNGOLOGY

## 2025-02-05 PROCEDURE — 1159F MED LIST DOCD IN RCRD: CPT | Mod: CPTII,S$GLB,, | Performed by: OTOLARYNGOLOGY

## 2025-02-05 PROCEDURE — 3008F BODY MASS INDEX DOCD: CPT | Mod: CPTII,S$GLB,, | Performed by: OTOLARYNGOLOGY

## 2025-02-05 NOTE — H&P (VIEW-ONLY)
History of Present Illness:   Nato Ng is a 39 y.o. year old male evaluated in the Otolaryngology-Head and Neck Surgery Clinic at [facility]. The patient was referred by Dr. Young evaluation of thyroid goiter.  Patient had goiter picked up in [facility] after x-ray of the chest incidentally with compression of the trachea.  He had an ultrasound and I needle biopsy done and saw Dr. Young.  CT scan showed goiter with some substernal component and he was referred to me for discussion of thyroidectomy.  He denies any family history of thyroid cancer.  He denies any prior radiation exposure.  He is not really having any stridor shortness of breath dysphagia or voice changes.           Past Medical/Surgical History  Past Medical History:   Diagnosis Date    Bulging lumbar disc     Fatigue 4/6/2018    Insomnia 4/6/2018    Upper respiratory tract infection 8/20/2021     His  has a past surgical history that includes Leg Surgery; Spinal fusion (11/2019); and Fracture surgery (1997).     Past Family/Social History  His family history includes Arthritis in his maternal grandfather; Diabetes in his maternal grandfather.  He  reports that he has quit smoking. His smoking use included vaping with nicotine and cigarettes. He started smoking about 16 years ago. He has a 8 pack-year smoking history. His smokeless tobacco use includes snuff. He reports that he does not drink alcohol and does not use drugs.     Medications/Allergies/Immunizations  His current medication(s) include:   Current Outpatient Medications   Medication Sig Dispense Refill    hydrOXYzine pamoate (VISTARIL) 25 MG Cap Take 1-2 capsules (25-50 mg total) by mouth nightly as needed (for Insomnia). 60 capsule 0    ketoconazole (NIZORAL) 2 % shampoo Apply 1 Application topically twice a week.      omeprazole (PRILOSEC) 20 MG capsule Take 1 capsule (20 mg total) by mouth once daily. 31 each 11     No current facility-administered medications for  this visit.        Allergies: Penicillins     Immunizations:   Immunization History   Administered Date(s) Administered    COVID-19, MRNA, LN-S, PF (MODERNA FULL 0.5 ML DOSE) 07/28/2021, 07/28/2021    Hepatitis A, Adult 08/20/2019, 09/30/2020    Hepatitis B, Pediatric/Adolescent 09/23/1997, 11/17/1997, 04/28/1998    Influenza 09/12/2018    Influenza - Quadrivalent - PF *Preferred* (6 months and older) 03/23/2018, 09/30/2020, 10/11/2022    Pneumococcal Conjugate - 20 Valent 09/02/2022    Pneumococcal Polysaccharide - 23 Valent 03/08/2017    Td (ADULT) 04/28/1998    Tdap 03/08/2017         Review of Systems       Answers submitted by the patient for this visit:  Review of Symptoms Questionnaire  (Submitted on 1/29/2025)  Fatigue (Tiredness)?: Yes  Voice Change?: Yes  None of these : Yes  None of these: Yes  None of these : Yes  Acid Reflux?: Yes  None of these: Yes  None of these: Yes  None of these : Yes  None of these: Yes  Hot all of the time? : Yes  dizziness: Yes  Light-headedness: Yes  None of these: Yes  sleep disturbance: Yes      All other systems are negative except for that listed in the HPI.      PHYSICAL EXAM:   Vital Signs:  Ht 6' (1.829 m)   BMI 39.33 kg/m²      General:  Well-developed, well-nourished  Communication and Voice:  Clear pitch and clarity  Hearing: Hearing adequate for verbal communication bilaterally   Inspection:  Normocephalic and atraumatic without mass or lesion  Palpation:  Facial skeleton intact without bony stepoffs  Parotid Glands:  No mass or tenderness  Facial Strength:  Facial motility symmetric and full bilaterally  Pinna:  External ear intact and fully developed  External canal:  Canal is patent with intact skin  Tympanic Membrane:  Clear and mobile  External nose:  No scar or anatomic deformity  Internal Nose:  Septum intact and midline.  No edema, polyp, or rhinorrhea.  TMJ:  No pain to palpation with full mobility  Oral cavity, Lips, Teeth, and Gums:  Mucosa and teeth  intact and viable, No lesions, masses or ulcers  Oropharynx: No erythema or exudate, no masses or ulcerations, non-obstructive tonsils  Nasopharynx:  No mass or lesion with intact mucosa  Hypopharynx:  Not well visualized secondary to gagging  Larynx:  Not well visualized secondary to gagging  Neck, Trachea, Lymphatics:  Midline trachea without mass or lesion, no lymphadenopathy  Thyroid:  Large left-sided goiter at least 7 cm with inferior border not palpable at the clavicle  Eyes: No nystagmus with equal extraocular motion bilaterally  Neuro/Psych/Balance: Patient oriented and appropriate in interaction;  Appropriate mood and affect;  Gait is intact with no imbalance; Cranial nerves I-XII are intact  Respiratory effort:  Equal inspiration and expiration without stridor  Peripheral Vascular:  Warm extremities with equal pulses     Laryngoscopy done by Dr. Young with normal vocal cord mobility.    PATHOLOGY REVIEW:    FNA thyroid 01/27/2025   [facility] Thyroid Cytology Category: Suspicious for Follicular Neoplasm Abnormal      RADIOLOGIC REVIEW:    I have personally reviewed the CT scan and went over the imaging with the patient.  Large left-sided goiter with partial substernal component.  CT neck with contrast 01/27/2025        Impression:     1. Large left thyroid gland/nodule, as above, with significant narrowing of the traversing left internal jugular vein.  The trachea is deviated to the right with mild to moderate narrowing.  2. Other findings as above.     ASSESSMENT:   1. Thyroid goiter    2. Substernal goiter            PLAN:   Different options for management of thyroid goiter discussed.  FNA is inconclusive.  I recommended left thyroid lobectomy with excision of substernal component.  This is unlikely to need Cardiothoracic but I would prefer to do this at [facility] in case I need Cardiothoracic backup.  Risks benefits and alternatives of the procedure discussed    I explained the risks of  thyroidectomy include, but are not limited to, infection, bleeding, scarring, failure to achieve the diagnosis, no evidence of cancer, recurrence, collection of blood or tissue fluid requiring drainage, injury to the recurrent laryngeal nerve with resultant temporary or permanent hoarseness (1% permanent risk with up to 10% temporary risk, greater in revision operations), injury to the superior laryngeal nerve with resultant loss of the upper register for singing or challenges with yelling, temporary or permanent hypocalcemia related to injury or devascularization of the parathyroid glands (less than 5% permanent, up to 30-60% when paratracheal dissection is accomplished, again greater in revision operations), and the need for additional procedures or therapies.  Time was allowed for questions, and all questions were answered to the patient's apparent satisfaction. The risks of paratracheal lymph node dissection are included above.     I have scheduled him for 02/11/2025.        I believe that Mr. Ng has a good understanding of the issues involved and I answered all of his questions.     DISCLAIMER: This note was prepared with [employer] voice recognition transcription software. Garbled syntax, mangled pronouns, and other bizarre constructions may be attributed to that software system. While efforts were made to correct any mistakes made by this voice recognition program, some errors and/or omissions may remain in the note that were missed when the note was originally created.

## 2025-02-05 NOTE — PROGRESS NOTES
History of Present Illness:   Nato Ng is a 39 y.o. year old male evaluated in the Otolaryngology-Head and Neck Surgery Clinic at [facility]. The patient was referred by Dr. Young evaluation of thyroid goiter.  Patient had goiter picked up in [facility] after x-ray of the chest incidentally with compression of the trachea.  He had an ultrasound and I needle biopsy done and saw Dr. Young.  CT scan showed goiter with some substernal component and he was referred to me for discussion of thyroidectomy.  He denies any family history of thyroid cancer.  He denies any prior radiation exposure.  He is not really having any stridor shortness of breath dysphagia or voice changes.           Past Medical/Surgical History  Past Medical History:   Diagnosis Date    Bulging lumbar disc     Fatigue 4/6/2018    Insomnia 4/6/2018    Upper respiratory tract infection 8/20/2021     His  has a past surgical history that includes Leg Surgery; Spinal fusion (11/2019); and Fracture surgery (1997).     Past Family/Social History  His family history includes Arthritis in his maternal grandfather; Diabetes in his maternal grandfather.  He  reports that he has quit smoking. His smoking use included vaping with nicotine and cigarettes. He started smoking about 16 years ago. He has a 8 pack-year smoking history. His smokeless tobacco use includes snuff. He reports that he does not drink alcohol and does not use drugs.     Medications/Allergies/Immunizations  His current medication(s) include:   Current Outpatient Medications   Medication Sig Dispense Refill    hydrOXYzine pamoate (VISTARIL) 25 MG Cap Take 1-2 capsules (25-50 mg total) by mouth nightly as needed (for Insomnia). 60 capsule 0    ketoconazole (NIZORAL) 2 % shampoo Apply 1 Application topically twice a week.      omeprazole (PRILOSEC) 20 MG capsule Take 1 capsule (20 mg total) by mouth once daily. 31 each 11     No current facility-administered medications for  this visit.        Allergies: Penicillins     Immunizations:   Immunization History   Administered Date(s) Administered    COVID-19, MRNA, LN-S, PF (MODERNA FULL 0.5 ML DOSE) 07/28/2021, 07/28/2021    Hepatitis A, Adult 08/20/2019, 09/30/2020    Hepatitis B, Pediatric/Adolescent 09/23/1997, 11/17/1997, 04/28/1998    Influenza 09/12/2018    Influenza - Quadrivalent - PF *Preferred* (6 months and older) 03/23/2018, 09/30/2020, 10/11/2022    Pneumococcal Conjugate - 20 Valent 09/02/2022    Pneumococcal Polysaccharide - 23 Valent 03/08/2017    Td (ADULT) 04/28/1998    Tdap 03/08/2017         Review of Systems       Answers submitted by the patient for this visit:  Review of Symptoms Questionnaire  (Submitted on 1/29/2025)  Fatigue (Tiredness)?: Yes  Voice Change?: Yes  None of these : Yes  None of these: Yes  None of these : Yes  Acid Reflux?: Yes  None of these: Yes  None of these: Yes  None of these : Yes  None of these: Yes  Hot all of the time? : Yes  dizziness: Yes  Light-headedness: Yes  None of these: Yes  sleep disturbance: Yes      All other systems are negative except for that listed in the HPI.      PHYSICAL EXAM:   Vital Signs:  Ht 6' (1.829 m)   BMI 39.33 kg/m²      General:  Well-developed, well-nourished  Communication and Voice:  Clear pitch and clarity  Hearing: Hearing adequate for verbal communication bilaterally   Inspection:  Normocephalic and atraumatic without mass or lesion  Palpation:  Facial skeleton intact without bony stepoffs  Parotid Glands:  No mass or tenderness  Facial Strength:  Facial motility symmetric and full bilaterally  Pinna:  External ear intact and fully developed  External canal:  Canal is patent with intact skin  Tympanic Membrane:  Clear and mobile  External nose:  No scar or anatomic deformity  Internal Nose:  Septum intact and midline.  No edema, polyp, or rhinorrhea.  TMJ:  No pain to palpation with full mobility  Oral cavity, Lips, Teeth, and Gums:  Mucosa and teeth  intact and viable, No lesions, masses or ulcers  Oropharynx: No erythema or exudate, no masses or ulcerations, non-obstructive tonsils  Nasopharynx:  No mass or lesion with intact mucosa  Hypopharynx:  Not well visualized secondary to gagging  Larynx:  Not well visualized secondary to gagging  Neck, Trachea, Lymphatics:  Midline trachea without mass or lesion, no lymphadenopathy  Thyroid:  Large left-sided goiter at least 7 cm with inferior border not palpable at the clavicle  Eyes: No nystagmus with equal extraocular motion bilaterally  Neuro/Psych/Balance: Patient oriented and appropriate in interaction;  Appropriate mood and affect;  Gait is intact with no imbalance; Cranial nerves I-XII are intact  Respiratory effort:  Equal inspiration and expiration without stridor  Peripheral Vascular:  Warm extremities with equal pulses     Laryngoscopy done by Dr. Young with normal vocal cord mobility.    PATHOLOGY REVIEW:    FNA thyroid 01/27/2025   [facility] [facility] Thyroid Cytology Category: Suspicious for Follicular Neoplasm Abnormal      RADIOLOGIC REVIEW:    I have personally reviewed the CT scan and went over the imaging with the patient.  Large left-sided goiter with partial substernal component.  CT neck with contrast 01/27/2025        Impression:     1. Large left thyroid gland/nodule, as above, with significant narrowing of the traversing left internal jugular vein.  The trachea is deviated to the right with mild to moderate narrowing.  2. Other findings as above.     ASSESSMENT:   1. Thyroid goiter    2. Substernal goiter            PLAN:   Different options for management of thyroid goiter discussed.  FNA is inconclusive.  I recommended left thyroid lobectomy with excision of substernal component.  This is unlikely to need Cardiothoracic but I would prefer to do this at [facility] in case I need Cardiothoracic backup.  Risks benefits and alternatives of the procedure discussed    I explained the risks of  thyroidectomy include, but are not limited to, infection, bleeding, scarring, failure to achieve the diagnosis, no evidence of cancer, recurrence, collection of blood or tissue fluid requiring drainage, injury to the recurrent laryngeal nerve with resultant temporary or permanent hoarseness (1% permanent risk with up to 10% temporary risk, greater in revision operations), injury to the superior laryngeal nerve with resultant loss of the upper register for singing or challenges with yelling, temporary or permanent hypocalcemia related to injury or devascularization of the parathyroid glands (less than 5% permanent, up to 30-60% when paratracheal dissection is accomplished, again greater in revision operations), and the need for additional procedures or therapies.  Time was allowed for questions, and all questions were answered to the patient's apparent satisfaction. The risks of paratracheal lymph node dissection are included above.     I have scheduled him for 02/11/2025.        I believe that Mr. Ng has a good understanding of the issues involved and I answered all of his questions.     DISCLAIMER: This note was prepared with [email or web address] voice recognition transcription software. Garbled syntax, mangled pronouns, and other bizarre constructions may be attributed to that software system. While efforts were made to correct any mistakes made by this voice recognition program, some errors and/or omissions may remain in the note that were missed when the note was originally created.

## 2025-02-07 NOTE — PRE-PROCEDURE INSTRUCTIONS
PreOp Instructions given:    -- Medication information (what to hold and what to take)   -- NPO guidelines as follows: (or as per your Surgeon)  Stop ALL solid food, gum, candy 8 hours before arrival time.  Stop all CLOUDY liquids: coffee with creamer, cloudy juices, 8 hours prior to arrival time.  The patient should be ENCOURAGED to drink carbohydrate-rich clear liquids (sports drinks, clear juices) until 2 hours prior to arrival time.  Stop clear liquids 2 hours prior to arrival time.  CLEAR liquids include water, black coffee NO creamer, clear oral rehydration drinks, clear sports drinks and clear fruit juices (no orange juice, no pulpy juices, no apple cider).   IF IN DOUBT, drink water instead.   NOTHING TO DRINK 2 hours before to surgery/procedure  time. If you are told to take medication on the morning of surgery, it may be taken with a sip of water.   -- *Arrival place and directions given*.  Time to be given the day before procedure by the Surgeon's Office   -- Bathe with antibacterial soap (dial or Hibiclens as instructed)  -- Don't wear any jewelry or valuables and not metals on skin or hair AM of surgery   -- No makeup or moisturizer to face   -- No perfume/cologne, powder, lotions, aftershave or deodorant    Pt verbalized understanding.         *If going to , see below:     Directions and Instructions for [facility]   At [facility], we have an outstanding team of physicians, anesthesiologists, CRNAs, Registered Nurses, Surgical Technologists, and other ancillary team members all focused on your surgical and procedural care.   Before Your Procedure:   The physician's office will call you with a specific arrival time and directions a day or two before your scheduled procedure. You may also receive these instructions through your [facility] portal.   Day of Procedure:   Please be sure to arrive at the arrival time given or you may risk your surgery being delayed or  canceled. The arrival time is earlier than your scheduled surgery or procedure time. In the winter months please dress warm and bring blankets for you or your child as the waiting room may be cold. If you have difficulty locating the facility, please give us a call at [phone].   Directions:   The [facility] is located on the 1st floor of the hospital building near the [place] entrance.   Parking:   You will park in the South Parking Garage (note location on map). [facility] opens at 5:00 a.m. and has a drop off area by the entrance.  parking is available starting at 7:00 a.m. Please see below for further  parking instructions.   Directions from the parking garage elevators   Blue [facility] Elevators: From the parking garage, take the blue [place] elevators (located in the center of the parking garage) to the 1st floor of the garage. You will then take a right once off the elevators then another right to the outside of the parking garage. You will be across from the [facility]. You will walk down the sidewalk, pass the  curve at the [place] entrance and continue to follow the sidewalk. You will pass the radiation oncology entrance on your right. Continue to follow the sidewalk to the [facility] glass door entrance.   Hospital Entrance (Inside Route): If a mostly inside route is preferred: Take the inside elevator bank (located at the far north end of the garage) from the parking garage to the 1st floor. On the 1st floor walk past [employer]. Keep walking down the center of the hallway towards the hospital elevators. Once you reach the red brick [place]ina, take a left and go past the hospital elevators. Take another left and follow the blue and white [place] signs around the hallway to the end. Go outside of the door. You will see the [facility] entrance to your right.   Drop Off:   There is a drop off area at  the doors of the [facility] for your convenience. If utilized for pediatric patients, an adult must accompany the patient into the surgery center while another adult franklin the vehicle.   [place] (at 7:00 a.m.):   Upon check-in, please let the  know that you are utilizing [employer] parking which is free. The . will then call [employer] for your car to be picked up. Your keys and phone number will be collected and given to [employer] services. You will then be given a ticket. Upon discharge, [employer] will be notified to bring your vehicle back when you are ready.   2/6/2024      If going to 2nd floor surgery center, see below:    Directions to the 2nd floor ([facility]) Surgery Center  The hallway to get to the surgery center is on the 2nd fl between the gold elevators in the atrium.  Follow the hallway into the waiting room (has a fish tank) and check in at desk.

## 2025-02-10 ENCOUNTER — PATIENT MESSAGE (OUTPATIENT)
Dept: OTOLARYNGOLOGY | Facility: CLINIC | Age: 40
End: 2025-02-10
Payer: COMMERCIAL

## 2025-02-11 ENCOUNTER — ANESTHESIA (OUTPATIENT)
Dept: SURGERY | Facility: HOSPITAL | Age: 40
End: 2025-02-11
Payer: COMMERCIAL

## 2025-02-11 ENCOUNTER — ANESTHESIA EVENT (OUTPATIENT)
Dept: SURGERY | Facility: HOSPITAL | Age: 40
End: 2025-02-11
Payer: COMMERCIAL

## 2025-02-11 ENCOUNTER — HOSPITAL ENCOUNTER (OUTPATIENT)
Facility: HOSPITAL | Age: 40
Discharge: HOME | End: 2025-02-11
Attending: OTOLARYNGOLOGY | Admitting: OTOLARYNGOLOGY
Payer: COMMERCIAL

## 2025-02-11 VITALS
TEMPERATURE: 98 F | OXYGEN SATURATION: 95 % | WEIGHT: 289.88 LBS | RESPIRATION RATE: 18 BRPM | BODY MASS INDEX: 39.26 KG/M2 | HEIGHT: 72 IN | DIASTOLIC BLOOD PRESSURE: 66 MMHG | SYSTOLIC BLOOD PRESSURE: 133 MMHG | HEART RATE: 78 BPM

## 2025-02-11 DIAGNOSIS — E07.9 THYROID MASS: ICD-10-CM

## 2025-02-11 DIAGNOSIS — E04.9 SUBSTERNAL THYROID GOITER: Primary | ICD-10-CM

## 2025-02-11 PROCEDURE — 36000707: Performed by: OTOLARYNGOLOGY

## 2025-02-11 PROCEDURE — C1729 CATH, DRAINAGE: HCPCS | Performed by: OTOLARYNGOLOGY

## 2025-02-11 PROCEDURE — 25000003 PHARM REV CODE 250: Performed by: OTOLARYNGOLOGY

## 2025-02-11 PROCEDURE — 63600175 PHARM REV CODE 636 W HCPCS: Performed by: OTOLARYNGOLOGY

## 2025-02-11 PROCEDURE — 37000009 HC ANESTHESIA EA ADD 15 MINS: Performed by: OTOLARYNGOLOGY

## 2025-02-11 PROCEDURE — 25000003 PHARM REV CODE 250: Performed by: ANESTHESIOLOGY

## 2025-02-11 PROCEDURE — 36000706: Performed by: OTOLARYNGOLOGY

## 2025-02-11 PROCEDURE — 63600175 PHARM REV CODE 636 W HCPCS: Mod: JZ,TB | Performed by: ANESTHESIOLOGY

## 2025-02-11 PROCEDURE — 37000008 HC ANESTHESIA 1ST 15 MINUTES: Performed by: OTOLARYNGOLOGY

## 2025-02-11 PROCEDURE — 25000003 PHARM REV CODE 250

## 2025-02-11 PROCEDURE — 71000015 HC POSTOP RECOV 1ST HR: Performed by: OTOLARYNGOLOGY

## 2025-02-11 PROCEDURE — 27201423 OPTIME MED/SURG SUP & DEVICES STERILE SUPPLY: Performed by: OTOLARYNGOLOGY

## 2025-02-11 PROCEDURE — 71000044 HC DOSC ROUTINE RECOVERY FIRST HOUR: Performed by: OTOLARYNGOLOGY

## 2025-02-11 PROCEDURE — 63600175 PHARM REV CODE 636 W HCPCS

## 2025-02-11 PROCEDURE — 88307 TISSUE EXAM BY PATHOLOGIST: CPT | Performed by: STUDENT IN AN ORGANIZED HEALTH CARE EDUCATION/TRAINING PROGRAM

## 2025-02-11 RX ORDER — LIDOCAINE HYDROCHLORIDE 10 MG/ML
1 INJECTION, SOLUTION EPIDURAL; INFILTRATION; INTRACAUDAL; PERINEURAL ONCE AS NEEDED
Status: DISCONTINUED | OUTPATIENT
Start: 2025-02-11 | End: 2025-02-11 | Stop reason: HOSPADM

## 2025-02-11 RX ORDER — PHENYLEPHRINE HYDROCHLORIDE 10 MG/ML
INJECTION INTRAVENOUS
Status: DISCONTINUED | OUTPATIENT
Start: 2025-02-11 | End: 2025-02-11

## 2025-02-11 RX ORDER — CEPHALEXIN 500 MG/1
500 CAPSULE ORAL EVERY 6 HOURS
Qty: 28 CAPSULE | Refills: 0 | Status: SHIPPED | OUTPATIENT
Start: 2025-02-11 | End: 2025-02-18

## 2025-02-11 RX ORDER — ONDANSETRON 4 MG/1
4 TABLET, ORALLY DISINTEGRATING ORAL EVERY 8 HOURS PRN
Qty: 20 TABLET | Refills: 0 | Status: SHIPPED | OUTPATIENT
Start: 2025-02-11

## 2025-02-11 RX ORDER — GLUCAGON 1 MG
1 KIT INJECTION
Status: DISCONTINUED | OUTPATIENT
Start: 2025-02-11 | End: 2025-02-11 | Stop reason: HOSPADM

## 2025-02-11 RX ORDER — CLINDAMYCIN PHOSPHATE 900 MG/50ML
INJECTION, SOLUTION INTRAVENOUS
Status: DISCONTINUED | OUTPATIENT
Start: 2025-02-11 | End: 2025-02-11

## 2025-02-11 RX ORDER — ONDANSETRON HYDROCHLORIDE 2 MG/ML
INJECTION, SOLUTION INTRAVENOUS
Status: DISCONTINUED | OUTPATIENT
Start: 2025-02-11 | End: 2025-02-11

## 2025-02-11 RX ORDER — ACETAMINOPHEN 500 MG
500 TABLET ORAL EVERY 6 HOURS PRN
Qty: 50 TABLET | Refills: 0 | Status: SHIPPED | OUTPATIENT
Start: 2025-02-11

## 2025-02-11 RX ORDER — SUCCINYLCHOLINE CHLORIDE 20 MG/ML
INJECTION INTRAMUSCULAR; INTRAVENOUS
Status: DISCONTINUED | OUTPATIENT
Start: 2025-02-11 | End: 2025-02-11

## 2025-02-11 RX ORDER — DEXMEDETOMIDINE HYDROCHLORIDE 100 UG/ML
INJECTION, SOLUTION INTRAVENOUS
Status: DISCONTINUED | OUTPATIENT
Start: 2025-02-11 | End: 2025-02-11

## 2025-02-11 RX ORDER — EPINEPHRINE 1 MG/ML
INJECTION INTRAMUSCULAR; INTRAVENOUS; SUBCUTANEOUS
Status: DISCONTINUED | OUTPATIENT
Start: 2025-02-11 | End: 2025-02-11 | Stop reason: HOSPADM

## 2025-02-11 RX ORDER — FENTANYL CITRATE 50 UG/ML
INJECTION, SOLUTION INTRAMUSCULAR; INTRAVENOUS
Status: DISCONTINUED | OUTPATIENT
Start: 2025-02-11 | End: 2025-02-11

## 2025-02-11 RX ORDER — PROPOFOL 10 MG/ML
VIAL (ML) INTRAVENOUS
Status: DISCONTINUED | OUTPATIENT
Start: 2025-02-11 | End: 2025-02-11

## 2025-02-11 RX ORDER — OXYCODONE AND ACETAMINOPHEN 5; 325 MG/1; MG/1
1 TABLET ORAL
Status: DISCONTINUED | OUTPATIENT
Start: 2025-02-11 | End: 2025-02-11 | Stop reason: HOSPADM

## 2025-02-11 RX ORDER — KETAMINE HCL IN 0.9 % NACL 50 MG/5 ML
SYRINGE (ML) INTRAVENOUS
Status: DISCONTINUED | OUTPATIENT
Start: 2025-02-11 | End: 2025-02-11

## 2025-02-11 RX ORDER — MIDAZOLAM HYDROCHLORIDE 1 MG/ML
INJECTION INTRAMUSCULAR; INTRAVENOUS
Status: DISCONTINUED | OUTPATIENT
Start: 2025-02-11 | End: 2025-02-11

## 2025-02-11 RX ORDER — HALOPERIDOL 5 MG/ML
0.5 INJECTION INTRAMUSCULAR EVERY 10 MIN PRN
Status: DISCONTINUED | OUTPATIENT
Start: 2025-02-11 | End: 2025-02-11 | Stop reason: HOSPADM

## 2025-02-11 RX ORDER — DEXAMETHASONE SODIUM PHOSPHATE 4 MG/ML
INJECTION, SOLUTION INTRA-ARTICULAR; INTRALESIONAL; INTRAMUSCULAR; INTRAVENOUS; SOFT TISSUE
Status: DISCONTINUED | OUTPATIENT
Start: 2025-02-11 | End: 2025-02-11

## 2025-02-11 RX ORDER — KETOROLAC TROMETHAMINE 15 MG/ML
15 INJECTION, SOLUTION INTRAMUSCULAR; INTRAVENOUS EVERY 8 HOURS PRN
Status: DISCONTINUED | OUTPATIENT
Start: 2025-02-11 | End: 2025-02-11 | Stop reason: HOSPADM

## 2025-02-11 RX ORDER — OXYCODONE HYDROCHLORIDE 5 MG/1
5 TABLET ORAL EVERY 6 HOURS PRN
Qty: 20 TABLET | Refills: 0 | Status: SHIPPED | OUTPATIENT
Start: 2025-02-11

## 2025-02-11 RX ORDER — HYDROMORPHONE HYDROCHLORIDE 1 MG/ML
0.2 INJECTION, SOLUTION INTRAMUSCULAR; INTRAVENOUS; SUBCUTANEOUS EVERY 10 MIN PRN
Status: DISCONTINUED | OUTPATIENT
Start: 2025-02-11 | End: 2025-02-11 | Stop reason: HOSPADM

## 2025-02-11 RX ORDER — SODIUM CHLORIDE 9 MG/ML
INJECTION, SOLUTION INTRAVENOUS CONTINUOUS
Status: DISCONTINUED | OUTPATIENT
Start: 2025-02-11 | End: 2025-02-11 | Stop reason: HOSPADM

## 2025-02-11 RX ORDER — LIDOCAINE HYDROCHLORIDE AND EPINEPHRINE 10; 10 UG/ML; MG/ML
INJECTION, SOLUTION INFILTRATION; PERINEURAL
Status: DISCONTINUED | OUTPATIENT
Start: 2025-02-11 | End: 2025-02-11 | Stop reason: HOSPADM

## 2025-02-11 RX ADMIN — FENTANYL CITRATE 25 MCG: 50 INJECTION, SOLUTION INTRAMUSCULAR; INTRAVENOUS at 08:02

## 2025-02-11 RX ADMIN — SUCCINYLCHOLINE 100 MG: 20 INJECTION, SOLUTION INTRAMUSCULAR; INTRAVENOUS at 08:02

## 2025-02-11 RX ADMIN — HYDROMORPHONE HYDROCHLORIDE 0.2 MG: 1 INJECTION, SOLUTION INTRAMUSCULAR; INTRAVENOUS; SUBCUTANEOUS at 11:02

## 2025-02-11 RX ADMIN — MIDAZOLAM HYDROCHLORIDE 2 MG: 2 INJECTION, SOLUTION INTRAMUSCULAR; INTRAVENOUS at 08:02

## 2025-02-11 RX ADMIN — PROPOFOL 200 MG: 10 INJECTION, EMULSION INTRAVENOUS at 08:02

## 2025-02-11 RX ADMIN — SODIUM CHLORIDE: 0.9 INJECTION, SOLUTION INTRAVENOUS at 08:02

## 2025-02-11 RX ADMIN — PHENYLEPHRINE HYDROCHLORIDE 50 MCG: 10 INJECTION INTRAVENOUS at 09:02

## 2025-02-11 RX ADMIN — OXYCODONE HYDROCHLORIDE AND ACETAMINOPHEN 1 TABLET: 5; 325 TABLET ORAL at 12:02

## 2025-02-11 RX ADMIN — HYDROMORPHONE HYDROCHLORIDE 0.2 MG: 1 INJECTION, SOLUTION INTRAMUSCULAR; INTRAVENOUS; SUBCUTANEOUS at 12:02

## 2025-02-11 RX ADMIN — TACROLIMUS 900 MG: 0.5 CAPSULE ORAL at 09:02

## 2025-02-11 RX ADMIN — Medication 30 MG: at 08:02

## 2025-02-11 RX ADMIN — SODIUM CHLORIDE 0.2 MCG/KG/MIN: 9 INJECTION, SOLUTION INTRAVENOUS at 09:02

## 2025-02-11 RX ADMIN — DEXAMETHASONE SODIUM PHOSPHATE 4 MG: 4 INJECTION, SOLUTION INTRAMUSCULAR; INTRAVENOUS at 09:02

## 2025-02-11 RX ADMIN — PHENYLEPHRINE HYDROCHLORIDE 100 MCG: 10 INJECTION INTRAVENOUS at 09:02

## 2025-02-11 RX ADMIN — Medication 10 MG: at 09:02

## 2025-02-11 RX ADMIN — ONDANSETRON 4 MG: 2 INJECTION INTRAMUSCULAR; INTRAVENOUS at 10:02

## 2025-02-11 RX ADMIN — DEXMEDETOMIDINE 8 MCG: 100 INJECTION, SOLUTION, CONCENTRATE INTRAVENOUS at 09:02

## 2025-02-11 RX ADMIN — PROPOFOL 100 MG: 10 INJECTION, EMULSION INTRAVENOUS at 09:02

## 2025-02-11 RX ADMIN — DEXMEDETOMIDINE 12 MCG: 100 INJECTION, SOLUTION, CONCENTRATE INTRAVENOUS at 08:02

## 2025-02-11 RX ADMIN — KETOROLAC TROMETHAMINE 15 MG: 15 INJECTION, SOLUTION INTRAMUSCULAR; INTRAVENOUS at 11:02

## 2025-02-11 RX ADMIN — DEXMEDETOMIDINE 4 MCG: 100 INJECTION, SOLUTION, CONCENTRATE INTRAVENOUS at 09:02

## 2025-02-11 NOTE — BRIEF OP NOTE
[facility] - Surgery ([facility])  Brief Operative Note/Discharge Note    Surgery Date: 2/11/2025     Surgeons and Role:     * Layla Lua MD - Primary     * Ramirez Gayle MD - Resident - Assisting    Procedure(s) (LRB):  Procedure(s):  THYROIDECTOMY    Pre-op Diagnosis:  Thyroid goiter [E04.9]    Post-op Diagnosis:  Post-Op Diagnosis Codes:     * Thyroid goiter [E04.9]    Procedure(s) (LRB):  THYROIDECTOMY (Left)    Anesthesia: General    Operative Findings: See full op note for full details - left substernal thyroidectomy, Jpx1, parathyroid identified and RLN intact    Estimated Blood Loss: 50cc           Specimens:   Specimens (From admission, onward)       Start     Ordered    02/11/25 1038  Specimen to Pathology, Surgery ENT  Once        Comments: Pre-op Diagnosis: Thyroid goiter [E04.9]Procedure(s):THYROIDECTOMY Number of specimens: 1Name of specimens: 1. Left thyroid lobe, short stitch  superior, long stitch lateral - permanent     References:    Click here for ordering Quick Tip   Question Answer Comment   Procedure Type: ENT    Specimen Class: Routine/Screening    Release to patient Immediate        02/11/25 1038                    Implants:  * No implants in log *    Discharge Note    OUTCOME: Patient tolerated treatment/procedure well without complication and is now ready for discharge.    DISPOSITION: Home or Self Care, discharged in good condition    PREOPERATIVE DIAGNOSIS: Pre-Op Diagnosis Codes:      * Thyroid goiter [E04.9]     FINAL DIAGNOSIS:  same as preop, see above  Post-Op Diagnosis Codes:     * Thyroid goiter [E04.9]    FOLLOWUP: In clinic    DISCHARGE INSTRUCTIONS:  See discharge tab for complete details. Discussed with patient/family preop.    Discharge Procedure Orders   Diet Adult Regular   Order Comments: As tolerated; start with clear liquids and progress as tolerated     Lifting restrictions   Order Comments: Nothing >10lbs for 2 weeks following surgery     Other restrictions (specify):    Order Comments: Do not lift >10lbs for 2 weeks following surgery    Wound care - if applicable/incision present  - OK to shower (if going home with drain see below), but do not abrasively rub your incision site  - Avoid swimming or soaking (including submerging in bathtub) your incision  - If skin glue is present over your incision, it will begin to peel off on it's own in the next week or so    If you are going home with a drain, please note the following:  - Sponge bathe until your drain is removed. Ok to bathe below level of drain (below neck), rinse hair/head in sink.     No driving until:   Order Comments: No driving while on narcotic pain medication if prescribed or at least 24h following anesthesia (if applicable)     Leave dressing on - Keep it clean, dry, and intact until clinic visit     Notify your health care provider if you experience any of the following:  temperature >100.4     Notify your health care provider if you experience any of the following:  redness, tenderness, or signs of infection (pain, swelling, redness, odor or green/yellow discharge around incision site)     Notify your health care provider if you experience any of the following:  difficulty breathing or increased cough   Order Comments: Swelling around neck causing difficulty breathing     Notify your health care provider if you experience any of the following:   Order Comments: Any persistent bleeding from nose or mouth, should report to nearest ED  If applicable, any persistent bleeding from trach or stoma site, report to nearest ED/call ambulance       TIME SPENT ON DISCHARGE: 35 minutes

## 2025-02-11 NOTE — DISCHARGE INSTRUCTIONS
Discharge instructions    Follow-up in approx 2-5 days for drain removal, call beforehand to report drain output to ensure it is time for removal (see below, must be less than 20cc in 24hr period).   Take your antibiotic until your drain is removed in clinic. See attached drain care information.     Your additional follow-up appointment with ENT will be in 1-2 weeks.   If you do not know your f/u appt date or do not receive a call/notification about your follow-up appointment within 5 days following discharge, please call the clinic during business hours at [phone].    Calcium taper   If you experience tingling or numbness around your mouth or hands, take 2 TUMS at time of symptoms and call the clinic if it persists.    Note: Referring to 1 tablet of regular strength TUMS (calcium carbonate) which is equivalent to 500mg of calcium.     Medications/Pain control  - Alternate the use of acetaminophen (Tylenol®) and ibuprofen (Advil®, Motrin®) every 4-6 hours to control your pain (unless otherwise instructed not to take these pain medications by another provider). A low-grade fever (101 degrees or less) following surgery may occur and should be treated with acetaminophen. Follow the directions on the bottle. If the fever persists (more than 2 days) or is greater than 102 degrees, call our office.   - For pain that continues despite over the counter pain medication, take your narcotic pain medication.   - Constipation is common especially when taking narcotic pain medication. You should drink plenty of liquids and eat a diet high in fiber. You may take a stool softener for a short time while on the pain medication. Avoid laxatives.  - Do not drive a car, operate machinery/power tools, drink alcohol including beer, make important decisions or sign legal documents while using narcotic pain medication.  - If prescribed, please take your antibiotic until all of the pills are finished (or until drain/packing removed in  clinic)   - avoid taking antibiotics on an empty stomach to prevent nausea/vomiting    Diet  - Resume your regular diet following surgery or if experiencing nausea eat a light diet as tolerated.   - Avoid spicy, greasy, fried or gaseous foods.   - If you experience any nausea, fluids that are clear and high in sugar are recommended (Gatorade, soda, Luis-aid, nonacidic juices).    Activity  - Please do not lift anything heavier than 10 pounds (2 gallons of milk) for 2 weeks  - No straining or exercising for 2 weeks  - Continue your regular activities as you can tolerate them   - Walking around and moving about is strongly encouraged     Wound care  - Sponge bathe until your drain is removed, then ok to shower after 48h following drain removal (to allow hole to close or can cover hole with occlusive dressing)  - Do not abrasively rub your incision site  - Avoid swimming or soaking (including submerging in bathtub) your incision  - If skin glue is present over your incision, it will begin to peel off on it's own in the next week or so    When to call the clinic or return to a hospital/ED  - If there's any redness or drainage from your incision, or if you have fever greater than 101.5, call the office immediately or go to the emergency room.  - If you have increased swelling around your neck or difficulty breathing, report to the nearest emergency room.     If you are going home with a drain, please note the following:  - Sponge bathe until your drain is removed.  - Take your antibiotic until your drain is removed.  - Please call clinic and notify nurse of drain output the morning of follow up appointment (2 days from discharge date).   - Please request home drain care instructions from RN prior to discharge. Patient to strip drain and record output twice times per day or more if needs to be emptied.

## 2025-02-11 NOTE — ANESTHESIA PREPROCEDURE EVALUATION
[facility]  Anesthesia Pre-Operative Evaluation     Patient Name: Nato Ng  YOB: 1985  MRN: [id number]  [facility]: [id number]       Admit Date: (Not on file)   Admit Team: Networked reference to record PCT      Date of Procedure: 2/11/2025  Anesthesia: General Procedure: Procedure(s) (LRB):  THYROIDECTOMY (Left)  Pre-Operative Diagnosis: Thyroid goiter [E04.9]  Proceduralist:Surgeons and Role:     * Layla Lua MD - Primary  Code Status: No Order   Advanced Directive: <no information>  Isolation Precautions: No active isolations  Capacity: Full capacity     SUBJECTIVE:   Nato Ng is a 39 y.o. male who  has a past medical history of Bulging lumbar disc, Fatigue (4/6/2018), Insomnia (4/6/2018), and Upper respiratory tract infection (8/20/2021).  No notes on file    1/13/25 CT neck  Markedly enlarged heterogeneous left thyroid gland measures 8.1 x 7.2 x 4.2 cm, as seen on the comparison ultrasound.  This lesion a displaces and severely narrows the traversing left internal jugular vein.  This lesion also a displaces the trachea to the right with mild-to-moderate tracheal narrowing.   - below the level of the vocal cords    Hospital LOS: 0 days  ICU LOS: Patient does not have an ICU stay during this admission.    he has a current medication list which includes the following long-term medication(s): ketoconazole and omeprazole.   Current Outpatient Medications   Medication Instructions    hydrOXYzine pamoate (VISTARIL) 25-50 mg, Oral, Nightly PRN    ketoconazole (NIZORAL) 2 % shampoo 1 Application, Twice weekly    omeprazole (PRILOSEC) 20 mg, Oral, Daily     ALLERGIES:     Review of patient's allergies indicates:   Allergen Reactions    Penicillins Hives     LDA:   AIRWAY:         * No LDAs found *      Lines/Drains/Airways       None                  Anesthesia Evaluation      Airway   Mallampati: III  TM distance: Normal  Neck ROM: Normal ROM  Dental    (+) Intact    Pulmonary     Cardiovascular     Neuro/Psych    (+) psychiatric history    GI/Hepatic/Renal      Endo/Other    (+) arthritis  Abdominal                    MEDICATIONS:     Current Outpatient Medications on File Prior to Encounter   Medication Sig Dispense Refill Last Dose/Taking    hydrOXYzine pamoate (VISTARIL) 25 MG Cap Take 1-2 capsules (25-50 mg total) by mouth nightly as needed (for Insomnia). 60 capsule 0 2/6/2025    ketoconazole (NIZORAL) 2 % shampoo Apply 1 Application topically twice a week.   Past Week    omeprazole (PRILOSEC) 20 MG capsule Take 1 capsule (20 mg total) by mouth once daily. 31 each 11 2/6/2025      Inpatient Medications:  Antibiotics (From admission, onward)      None          VTE Risk Mitigation (From admission, onward)      None              No current facility-administered medications for this encounter.     Current Outpatient Medications   Medication Sig Dispense Refill    hydrOXYzine pamoate (VISTARIL) 25 MG Cap Take 1-2 capsules (25-50 mg total) by mouth nightly as needed (for Insomnia). 60 capsule 0    ketoconazole (NIZORAL) 2 % shampoo Apply 1 Application topically twice a week.      omeprazole (PRILOSEC) 20 MG capsule Take 1 capsule (20 mg total) by mouth once daily. 31 each 11          History:   There are no hospital problems to display for this patient.    Surgical History:    has a past surgical history that includes Leg Surgery; Spinal fusion (11/2019); and Fracture surgery (1997).   Social History:    reports being sexually active and has had partner(s) who are female. He reports using the following method of birth control/protection: I.U.D..  reports that he has quit smoking. His smoking use included vaping with nicotine and cigarettes. He started smoking about 16 years ago. He has a 8.1 pack-year smoking history. His smokeless tobacco use includes snuff. He reports that he does not drink alcohol and does not use drugs.    There were no vitals filed for this visit.  Vital Signs Range  "(Last 24H):       There is no height or weight on file to calculate BMI.  Wt Readings from Last 4 Encounters:   12/10/24 131.5 kg (290 lb)   01/22/24 131.1 kg (288 lb 14.6 oz)   10/18/23 131.5 kg (289 lb 14.5 oz)   06/27/23 126 kg (277 lb 12.5 oz)        Intake/Output - Last 3 Shifts       None          Lab Results   Component Value Date    WBC 6.02 12/10/2024    HGB 16.1 12/10/2024    HCT 47.6 12/10/2024     12/10/2024     12/10/2024    K 4.1 12/10/2024     12/10/2024    CREATININE 1.1 12/10/2024    BUN 13 12/10/2024    CO2 25 12/10/2024    GLU 98 12/10/2024    CALCIUM 9.7 12/10/2024    ALKPHOS 83 12/10/2024    ALT 40 12/10/2024    AST 18 12/10/2024    ALBUMIN 4.0 12/10/2024    INR 0.9 10/02/2019    APTT 33.6 (H) 10/24/2019    HGBA1C 5.5 12/10/2024     No results found for this or any previous visit (from the past 12 hours).  No results for input(s): "WBC", "HGB", "HCT", "PLT", "NA", "K", "CREATININE", "GLU", "INR", "LACTATE", "5HIAAPLASMA", "5HIAAURINT", "5HIAA", "[id number]" in the last 168 hours.  No LMP for male patient.    EKG:   Results for orders placed or performed during the hospital encounter of 12/10/24   EKG 12-lead    Collection Time: 12/10/24 10:21 AM   Result Value Ref Range    QRS Duration 104 ms    [facility] QTC Calculation 415 ms    Narrative    Test Reason : Z00.00,    Vent. Rate :  82 BPM     Atrial Rate :  82 BPM     P-R Int : 200 ms          QRS Dur : 104 ms      QT Int : 356 ms       P-R-T Axes :  26  27   7 degrees    QTcB Int : 415 ms    Normal sinus rhythm  Baseline Artifact  When compared with ECG of 02-Oct-2019 13:07,  Program found technically poor ECG  Confirmed by Nadir Pretty ([id number]) on 12/10/2024 10:35:34 AM    Referred By: STEPHANI MARX           Confirmed By: Nadir Pretty     TTE:  No results found for this or any previous visit.      Pre-op Assessment          Review of Systems  Musculoskeletal:  Arthritis        Arthritis          Neurological:      Arthritis   "                         Psych:  Psychiatric History                  Physical Exam  General: Well nourished    Airway:  Mallampati: III / II  Mouth Opening: Normal  TM Distance: Normal  Tongue: Normal  Neck ROM: Normal ROM    Dental:  Intact        Anesthesia Plan  Type of Anesthesia, risks & benefits discussed:    Anesthesia Type: Gen ETT, Gen Natural Airway, MAC  Intra-op Monitoring Plan: Standard ASA Monitors  Post Op Pain Control Plan: multimodal analgesia and IV/PO Opioids PRN  Induction:  IV  Airway Plan: Direct and Video, Post-Induction  Informed Consent: Informed consent signed with the Patient and all parties understand the risks and agree with anesthesia plan.  All questions answered.   ASA Score: 2  Day of Surgery Review of History & Physical: H&P completed by Anesthesiologist.    Ready For Surgery From Anesthesia Perspective.     .

## 2025-02-11 NOTE — ANESTHESIA PROCEDURE NOTES
Intubation    Date/Time: 2/11/2025 9:04 AM    Performed by: Елена Vidal  Authorized by: Marily Yañez MD    Intubation:     Induction:  Intravenous    Intubated:  Postinduction    Mask Ventilation:  Easy with oral airway    Attempts:  1    Attempted By:  Student    Method of Intubation:  Video laryngoscopy    Blade:  Smith 4    Laryngeal View Grade: Grade IIA - cords partially seen      Difficult Airway Encountered?: No      Complications:  None    Airway Device:  EMG ETT (NIMS)    Airway Device Size:  8.0    Style/Cuff Inflation:  Cuffed (inflated to minimal occlusive pressure)    Tube secured:  22    Secured at:  The teeth    Placement Verified By:  Capnometry and Revisualization with laryngoscopy    Complicating Factors:  Obesity    Findings Post-Intubation:  BS equal bilateral and atraumatic/condition of teeth unchanged

## 2025-02-11 NOTE — ANESTHESIA POSTPROCEDURE EVALUATION
Anesthesia Post Evaluation    Patient: Nato Ng    Procedure(s) Performed: Procedure(s) (LRB):  THYROIDECTOMY (Left)    Final Anesthesia Type: general      Patient location during evaluation: PACU  Patient participation: Yes- Able to Participate  Level of consciousness: awake and alert  Post-procedure vital signs: reviewed and stable  Pain management: adequate  Airway patency: patent    PONV status at discharge: No PONV  Anesthetic complications: no      Cardiovascular status: blood pressure returned to baseline  Respiratory status: unassisted  Hydration status: euvolemic  Follow-up not needed.              Vitals Value Taken Time   /67 02/11/25 1232   Temp 36.8 °C (98.2 °F) 02/11/25 1109   Pulse 77 02/11/25 1240   Resp 18 02/11/25 1230   SpO2 96 % 02/11/25 1240   Vitals shown include unfiled device data.      No case tracking events are documented in the log.      Pain/Atif Score: Pain Rating Prior to Med Admin: 8 (2/11/2025 12:02 PM)  Atif Score: 10 (2/11/2025 12:00 PM)

## 2025-02-11 NOTE — PLAN OF CARE
Prepped pt for surgery. Procedure reviewed and confirmed with pt. Family present at bedside. All questions answered, no concerns. Bed in lowest position. Call light within reach. Safety maintained.

## 2025-02-11 NOTE — TRANSFER OF CARE
Anesthesia Transfer of Care Note    Patient: Nato Ng    Procedure(s) Performed: Procedure(s) (LRB):  THYROIDECTOMY (Left)    Patient location: PACU    Anesthesia Type: general    Transport from OR: Transported from OR on 6-10 L/min O2 by face mask with adequate spontaneous ventilation    Post pain: adequate analgesia    Post assessment: no apparent anesthetic complications and tolerated procedure well    Post vital signs: stable    Level of consciousness: awake and alert    Nausea/Vomiting: no nausea/vomiting    Complications: none    Transfer of care protocol was followed      Last vitals: Visit Vitals  /76 (BP Location: Left arm)   Pulse 80   Temp 37.1 °C (98.8 °F)   Resp 18   Ht 6' (1.829 m)   Wt 131.5 kg (289 lb 14.5 oz)   SpO2 (!) 94%   BMI 39.32 kg/m²

## 2025-02-12 ENCOUNTER — TELEPHONE (OUTPATIENT)
Dept: OTOLARYNGOLOGY | Facility: CLINIC | Age: 40
End: 2025-02-12
Payer: COMMERCIAL

## 2025-02-12 NOTE — TELEPHONE ENCOUNTER
----- Message from Layla Lua MD sent at 2/12/2025  4:33 PM CST -----   Patient needs follow-up Monday 02/17/2025 for drain removal.  [place] or [facility].  Patient preferences [place]

## 2025-02-12 NOTE — TELEPHONE ENCOUNTER
Spoke with pt about getting scheduled to have his drain removed on Monday with román. Earlt. Scheduled.

## 2025-02-13 PROBLEM — E04.9 SUBSTERNAL THYROID GOITER: Status: ACTIVE | Noted: 2025-02-13

## 2025-02-13 NOTE — OP NOTE
Date of service:  02/11/2025    Pre-operative Diagnosis:    Large left thyroid goiter    Post-operative Diagnosis:   Same status post removal    Procedures Performed:   Left thyroid lobectomy with excision of substernal component    Surgeon: Layla Lua MD    Assistant(s):  Ramirez Gayle MD    Anesthesia: General Endotracheal    EBL:   75 cc    Specimens:   Left thyroid lobe short stitch superior long stitch lateral    Findings:   Patient had a very large substernal goiter extending down into the thoracic inlet at the inferior pole of the left thyroid.  This was a large nodule coming off of a somewhat normal small thyroid at the superior pole.  Very layer inferior thyroid artery ligated with recurrent laryngeal nerve identified after delivery with parathyroid intact.    Indications for Procedure:   Mr. Alves is a 39-year-old gentleman seen in my clinic in referral from Dr. Young  with large substernal left thyroid goiter.  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    Procedure in Detail:    After informed consent was obtained from patient in holding area  with the risks benefits and alternatives reviewed patient was taken back to OR 17.  Anesthesia proceeded with general endotracheal anesthesia with intubation with a Nims tube.  Verification of proper functioning of the Nims tube was undertaken.  After 2nd IV placement patient was positioned with bilateral arms tucked with shoulder roll in place.  A 10 cm incision was marked out in a natural neck crease residential between the cricoid and sternal notch.  Neck was prepped and draped in  usual sterile fashion.    Incision was made with 15 blade through the skin and continued down to the platysma level.  Subplatysmal flaps were elevated with Bovie cautery superiorly to the level of thyroid cartilage and inferiorly to the sternal notch.  Yellow hooks were used to retract skin.  Midline raphe was entered with Bovie cautery and  sternohyoid muscles were .  These were retracted on the left with the logs.  Sternothyroid muscle was divided with Bovie cautery over the middle of the left thyroid lobe showing large goiter.  Sternothyroid was then freed up to the superior pole and inferiorly down to the inferior pole that was going below the thoracic inlet.  This is also retracted laterally.  Attention was 1st turned to the superior pole with dissection and ligation of superior thyroid vessels with hemoclips and 2-0 silk ties.  After superior pole was mobilized gland was mobilized bluntly with finger and delivered through the wound.  Layer by layer dissection was undertaken with division of middle thyroid vein and then large inferior thyroid artery which was clipped with medium hemoclips.  After delivery layer by layer dissection was undertaken through the parathyroid tissue that was kept intact superiorly.  Recurrent laryngeal nerve was identified and stimulated to around 370 microvolts on the Nims monitor.  This was at level of Xeroform 5 milliamps stimulate.  Once nerve was identified the lobe and large goiter was then freed from attachments to the trachea at berry's ligament with bipolar cautery taking care to protect the nerve.  Thyroid was then divided at isthmus with bipolar cautery.  Specimen was sent off and marked.  Wound was copiously irrigated.  Hemostasis was obtained with hemoclips and bipolar cautery.  Valsalva maneuver was undertaken on 2 different occasions with no further bleeding noted.  Fibrillar was placed along superior pole at the joint and then inferiorly at the inferior pole.  Fifteen [place] drain was placed with straps approximated in the midline.  Attention was then turned to closing.      Wound was closed with layers with platysma closed with deep 3-0 Vicryl suture.  Subdermal was closed with 3-0 Vicryl as well.  Skin was closed with a running locking 5 0 fast-absorbing gut suture.  Steri-Strips were placed  patient was turned over to Anesthesia awakened and taken to recovery in stable condition.    Complications:  None    Attestation:  I was present for the entire procedure    DISCLAIMER: This note was prepared with [employer] voice recognition transcription software. Garbled syntax, mangled pronouns, and other bizarre constructions may be attributed to that software system. While efforts were made to correct any mistakes made by this voice recognition program, some errors and/or omissions may remain in the note that were missed when the note was originally created.

## 2025-02-17 ENCOUNTER — OFFICE VISIT (OUTPATIENT)
Dept: OTOLARYNGOLOGY | Facility: CLINIC | Age: 40
End: 2025-02-17
Payer: COMMERCIAL

## 2025-02-17 VITALS — BODY MASS INDEX: 39.26 KG/M2 | HEIGHT: 72 IN | WEIGHT: 289.88 LBS

## 2025-02-17 DIAGNOSIS — E89.0 S/P THYROIDECTOMY: Primary | ICD-10-CM

## 2025-02-17 NOTE — PROGRESS NOTES
Subjective:   Patient ID: Nato Ng is a 39 y.o. male.    Chief Complaint: Post-op Evaluation (Pt is coming in today for post-op drain removal)    Patient is a pleasant 39 year old male presenting for drain removal following Left thyroid lobectomy with excision of substernal component on 2/11/25. He is overall doing well. Drainage has diminished. Denies pain, fever or purulent drainage from incision site.       Review of patient's allergies indicates:   Allergen Reactions    Penicillins Hives           Review of Systems   Constitutional: Negative.    HENT: Negative.     Eyes: Negative.    Respiratory: Negative.     Cardiovascular: Negative.    Gastrointestinal: Negative.    Endocrine: Negative.    Genitourinary: Negative.    Musculoskeletal:  Positive for neck pain.   Skin: Negative.    Allergic/Immunologic: Negative.    Neurological: Negative.    Hematological: Negative.    Psychiatric/Behavioral: Negative.           Objective:   Ht 6' (1.829 m)   Wt 131.5 kg (289 lb 14.5 oz)   BMI 39.32 kg/m²     Physical Exam  Constitutional:       Appearance: Normal appearance.   HENT:      Head: Normocephalic and atraumatic.   Neck:      Comments: Wound is clean and dry. Drain removed today with no complications.   Pulmonary:      Effort: Pulmonary effort is normal. No respiratory distress.   Musculoskeletal:      Cervical back: Normal range of motion.   Neurological:      Mental Status: He is alert.   Psychiatric:         Behavior: Behavior is cooperative.              Assessment:     1. S/P thyroidectomy        Plan:     S/P thyroidectomy      Wound is clean and dry today on exam. We removed drain today without difficulty and dressed wound in clinic. Plan to follow up with Dr. Lua as discussed. We discussed return precautions including fever, pain, or purulent drainage from wound site.

## 2025-02-20 LAB
FINAL PATHOLOGIC DIAGNOSIS: NORMAL
GROSS: NORMAL
Lab: NORMAL

## 2025-02-26 ENCOUNTER — OFFICE VISIT (OUTPATIENT)
Dept: OTOLARYNGOLOGY | Facility: CLINIC | Age: 40
End: 2025-02-26
Payer: COMMERCIAL

## 2025-02-26 VITALS — BODY MASS INDEX: 39.32 KG/M2 | HEIGHT: 72 IN

## 2025-02-26 DIAGNOSIS — Z09 POSTOP CHECK: Primary | ICD-10-CM

## 2025-02-26 PROCEDURE — 1160F RVW MEDS BY RX/DR IN RCRD: CPT | Mod: CPTII,S$GLB,, | Performed by: OTOLARYNGOLOGY

## 2025-02-26 PROCEDURE — 99024 POSTOP FOLLOW-UP VISIT: CPT | Mod: S$GLB,,, | Performed by: OTOLARYNGOLOGY

## 2025-02-26 PROCEDURE — 99999 PR PBB SHADOW E&M-EST. PATIENT-LVL III: CPT | Mod: PBBFAC,,, | Performed by: OTOLARYNGOLOGY

## 2025-02-26 PROCEDURE — 1159F MED LIST DOCD IN RCRD: CPT | Mod: CPTII,S$GLB,, | Performed by: OTOLARYNGOLOGY

## 2025-02-26 NOTE — LETTER
February 26, 2025      [facility]  [address] [place] [address] [address]ERS [id number]  Phone: [phone]  Fax: [phone]       Patient: Nato Ng   YOB: 1985  Date of Visit: 02/26/2025    To Whom It May Concern:    Concepción Ng  was at [facility] on 2/10/2025. The patient may return to work/school on 3/03/2025 with no restrictions. If you have any questions or concerns, or if I can be of further assistance, please do not hesitate to contact me.    Sincerely,    Fantasma Miranda MA

## 2025-02-26 NOTE — PROGRESS NOTES
Subjective: 39 y.o. male seen in follow up s/p  left thyroidectomy for substernal goiter 02/11/2025.  Patient doing very well postop.  Pain at  most 6/10.  He has no voice changes.  Denies any dysphagia odynophagia.   His only issue postop has been sleep which has been pre-existent but got worse.      Objective:  Ht 6' (1.829 m)   BMI 39.32 kg/m²     Exam:  General No acute distress    Incision clean dry intact with some expected erythema and induration healing well.      Path   Thyroid, left lobe, hemithyroidectomy:   Multinodular hyperplasia with adenomatoid nodule, negative for atypia or malignancy     Assessment / Plan:  Doing very well after left hemithyroidectomy with substernal nodule.  Right thyroid did not have any nodules but I discussed with patient and spouse that this could develop in the future.  He does not need routine yearly ultrasounds.  Recommend reconsulting with primary care about sleep.  Follow up p.r.n.

## 2025-03-12 NOTE — PROGRESS NOTES
The patient location is: [facility]  The chief complaint leading to consultation is:   Chief Complaint   Patient presents with    Snoring    Insomnia        Visit type: audiovisual    Face to Face time with patient: 10 mins  45 minutes of total time spent on the encounter, which includes face to face time and non-face to face time preparing to see the patient (eg, review of tests), Obtaining and/or reviewing separately obtained history, Documenting clinical information in the electronic or other health record, Independently interpreting results (not separately reported) and communicating results to the patient/family/caregiver, or Care coordination (not separately reported).         Each patient to whom he or she provides medical services by telemedicine is:  (1) informed of the relationship between the physician and patient and the respective role of any other health care provider with respect to management of the patient; and (2) notified that he or she may decline to receive medical services by telemedicine and may withdraw from such care at any time.    Notes:                                             Pulmonary Outpatient  Visit     Subjective:       Patient ID: Nato Ng is a 39 y.o. male.    Tobacco Use History[1]         Chief Complaint: Snoring and Insomnia          Nato Ng is 39 y.o.  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       Encounter Medications[2]        Pertinent Work Up:      Pulmonary Interventions:      Smoking hx:    Environmental/Occupational hx:        Interval  Hx:           The following portions of the patient's history were reviewed and updated as appropriate: He  has a past medical history of Bulging lumbar disc, Fatigue (4/6/2018), Insomnia (4/6/2018), and Upper respiratory tract infection (8/20/2021).  He does not have any pertinent problems on file.  He  has a past surgical history that includes Leg Surgery; Spinal fusion (11/2019); Fracture surgery (1997); and Thyroidectomy (Left, 2/11/2025).  His family history includes Arthritis in his maternal grandfather; Diabetes in his maternal grandfather.  He  reports that he has quit smoking. His smoking use included vaping with nicotine and cigarettes. He started smoking about 16 years ago. He has a 8.1 pack-year smoking history. His smokeless tobacco use includes snuff. He reports that he does not drink alcohol and does not use drugs.  He has a current medication list which includes the following prescription(s): acetaminophen, hydroxyzine pamoate, ketoconazole, omeprazole, ondansetron, and oxycodone.  Medications Ordered Prior to Encounter[3]  He is allergic to penicillins..      BP Readings from Last 3 Encounters:   03/13/25 130/80   02/11/25 133/66   12/10/24 138/80     Snoring / Sleep:     Jackson Questionnaire (validated YUE screening questionnaire)    YES -- Snoring/apnea    YES -- Fatigue    Body mass index is 39.33 kg/m².  (>25 is overweight, >30 is obese)    Blood Pressure = normal blood pressure  (PreHTN 120-139/80-89, Stg1 140-159/90-99, Stg2 >160/>100)  Jackson = 2 of three YUE categories are positive (high risk is 2-3 positive categories)     [place] Sleepiness Scale TOTAL =          3/13/2025   EPWORTH SLEEPINESS SCALE   Sitting and reading 0   Watching TV 0   Sitting, inactive in a public place (e.g. a theatre or a meeting) 0   As a passenger in a car for an hour without a break 0   Lying down to rest in the afternoon when circumstances permit 0   Sitting and talking to someone 0   Sitting quietly after a  lunch without alcohol 0   In a car, while stopped for a few minutes in traffic 0   Total score 0      (validated sleepiness questionnaire with a higher score indicating greater sleepiness; range 0-24)  Failed to redirect to the Timeline version of the [employer] SmartLink.    STOP-Bang Questionnaire (validated YUE screening questionnaire)  Negative unless checked off.  [] Snoring    [x]  Tired/Fatigued/Sleepy  [] Obstruction (apneas/choking)  [] Pressure (HTN)  [x] BMI >35  [] Age >50  [] Neck >40 cm  [x] Gender male   STOP-Bang = 3 (low risk 0-2,high risk 3-8)       MMRC Dyspnea Scale (4 is worst)     [] MMRC 0: Dyspneic on strenuous excercise (0 points)    [] MMRC 1: Dyspneic on walking a slight hill (0 points)    [] MMRC 2: Dyspneic on walking level ground; must stop occasionally due to breathlessness (1 point)    [] MMRC 3: Must stop for breathlessness after walking 100 yards or after a few minutes (2 points)    [] MMRC 4: Cannot leave house; breathless on dressing/undressing (3 points)                       3/13/2025   EPWORTH SLEEPINESS SCALE   Sitting and reading 0   Watching TV 0   Sitting, inactive in a public place (e.g. a theatre or a meeting) 0   As a passenger in a car for an hour without a break 0   Lying down to rest in the afternoon when circumstances permit 0   Sitting and talking to someone 0   Sitting quietly after a lunch without alcohol 0   In a car, while stopped for a few minutes in traffic 0   Total score 0            4/6/2018    11:00 AM   EPWORTH SLEEPINESS SCALE   Sitting and reading 0   Watching TV 0   Sitting, inactive in a public place (e.g. a theatre or a meeting) 0   As a passenger in a car for an hour without a break 0   Lying down to rest in the afternoon when circumstances permit 0   Sitting and talking to someone 0   Sitting quietly after a lunch without alcohol 0   In a car, while stopped for a few minutes in traffic 0   Total score 0        Please rate the CURRENT (i.e. LAST 2 WEEKS)  SEVERITY of your insomnia problem(s).  1. Difficulty falling asleep: Very severe  2. Difficulty staying asleep: None  3. Problem waking up too early: None  4. How SATISFIED/DISSATISFIED are you with your CURRENT sleep pattern?: Very dissatisfied  5. How NOTICEABLE to others do you think your sleep problem is in terms of impairing the quality of your life?: Somewhat  6.  How WORRIED/DISTRESSED are you about your current sleep problem?: Somewhat  7. To what extent do you consider your sleep problem to INTERFERE with your daily functioning (e.g. daytime fatigue, mood, ability to function at work/daily chores, concentration, memory, mood, etc.) CURRENTLY?: Somewhat  Total Score: 14  Interpretation: Subthreshold insomnia        Objective:     Vital Signs (Most Recent)  Vital Signs  BP: 130/80  Height and Weight  Height: 6' (182.9 cm)  Weight: 131.5 kg (290 lb)  BSA (Calculated - sq m): 2.58 sq meters  BMI (Calculated): 39.3  Weight in (lb) to have BMI = 25: 183.9]  Wt Readings from Last 2 Encounters:   03/13/25 131.5 kg (290 lb)   02/17/25 131.5 kg (289 lb 14.5 oz)       Physical Exam  Vitals and nursing note reviewed.   Constitutional:       Appearance: Normal appearance. He is obese.   HENT:      Head: Normocephalic and atraumatic.   Eyes:      Pupils: Pupils are equal, round, and reactive to light.   Neurological:      General: No focal deficit present.      Mental Status: He is alert and oriented to person, place, and time.          Laboratory  Lab Results   Component Value Date    WBC 6.02 12/10/2024    RBC 5.67 12/10/2024    HGB 16.1 12/10/2024    HCT 47.6 12/10/2024    MCV 84 12/10/2024    MCH 28.4 12/10/2024    MCHC 33.8 12/10/2024    RDW 13.3 12/10/2024     12/10/2024    MPV 8.9 (L) 12/10/2024    GRAN 3.2 04/03/2023    GRAN 55.6 04/03/2023    LYMPH 2.0 04/03/2023    LYMPH 34.5 04/03/2023    MONO 0.4 04/03/2023    MONO 7.2 04/03/2023    EOS 0.1 04/03/2023    BASO 0.04 04/03/2023    EOSINOPHIL 1.6  "04/03/2023    BASOPHIL 0.7 04/03/2023       BMP  Lab Results   Component Value Date     12/10/2024    K 4.1 12/10/2024     12/10/2024    CO2 25 12/10/2024    BUN 13 12/10/2024    CREATININE 1.1 12/10/2024    CALCIUM 9.7 12/10/2024    ANIONGAP 10 12/10/2024    ESTGFRAFRICA >60 08/25/2021    EGFRNONAA >60 08/25/2021    AST 18 12/10/2024    ALT 40 12/10/2024    PROT 7.7 12/10/2024          No results found for: "IGE"     No results found for: "ASPERGILLUS"  No results found for: "AFUMIGATUSCL"     No results found for: "ACE"     Diagnostic Results:  I have personally reviewed today the following studies:    US FNA Thyroid, 1st Lesion  Narrative: EXAMINATION:  US FINE NEEDLE ASPIRATION THYROID, FIRST LESION    CLINICAL HISTORY:  Nontoxic single thyroid nodule    TECHNIQUE:  Real-time ultrasonography was utilized to perform a fine needle aspiration.  Grayscale and color Doppler spot images were obtained.    COMPARISON:  Ultrasound thyroid dated January 3, 2025    FINDINGS:  The patient was informed of the risks, benefits, and alternatives to the procedure and all questions were answered.  The patient demonstrated verbal understanding and signed the informed consent.  A time-out was performed.    The patient was then prepped and draped in a sterile fashion and local anesthesia was achieved via a 1% lidocaine solution.    Using sonographic guidance, a 22-gauge needle was then inserted into the left thyroid nodule and a sample was returned. Four passes were made.  The patient tolerated the procedure without an immediate complication.  Impression: Ultrasound guided fine-needle aspiration of a left thyroid nodule.    Electronically signed by: Avery Costa  Date:    01/27/2025  Time:    14:44  CT Neck Chest With Contrast (XPD)  Narrative: EXAMINATION:  CT NECK CHEST WITH CONTRAST (XPD)    CLINICAL HISTORY:  Nontoxic goiter, unspecifiedthyroid goiter, surgical planning;    COMPARISON:  Thyroid ultrasound January " 3, 2025.    TECHNIQUE:  Axial CT images were obtained of the NECK and chest following administration of IV contrast.  Iterative reconstruction technique was used. The CT exam was performed using one or more of the following dose reduction techniques- Automated exposure control, adjustment of the mA and/or kV according to patient size, and/or use of iterative reconstructed technique.    FINDINGS:  No acute finding in the visualized intracranial regions.  Paranasal sinuses are clear.  Orbits are unremarkable.  No cervical lymphadenopathy.    Markedly enlarged heterogeneous left thyroid gland measures 8.1 x 7.2 x 4.2 cm, as seen on the comparison ultrasound.  This lesion a displaces and severely narrows the traversing left internal jugular vein.  This lesion also a displaces the trachea to the right with mild-to-moderate tracheal narrowing.    Lungs are clear.  No pneumothorax or pleural effusion.  Heart size is normal.  Coronary artery calcifications are present.  No acute osseous finding in the neck or chest.  No acute finding in the visualized upper abdomen.  Reversal the normal cervical spine lordosis which can be secondary to patient positioning or be secondary to muscle spasm.  Impression: 1. Large left thyroid gland/nodule, as above, with significant narrowing of the traversing left internal jugular vein.  The trachea is deviated to the right with mild to moderate narrowing.  2. Other findings as above.    Electronically signed by: Avery Costa  Date:    01/27/2025  Time:    13:59           Assessment/Plan:          1. Insomnia, unspecified type  Overview:  Chronic, Stable, cont trazodone    Assessment & Plan:  Please rate the CURRENT (i.e. LAST 2 WEEKS) SEVERITY of your insomnia problem(s).  1. Difficulty falling asleep: Very severe  2. Difficulty staying asleep: None  3. Problem waking up too early: None  4. How SATISFIED/DISSATISFIED are you with your CURRENT sleep pattern?: Very dissatisfied  5. How  NOTICEABLE to others do you think your sleep problem is in terms of impairing the quality of your life?: Somewhat  6.  How WORRIED/DISTRESSED are you about your current sleep problem?: Somewhat  7. To what extent do you consider your sleep problem to INTERFERE with your daily functioning (e.g. daytime fatigue, mood, ability to function at work/daily chores, concentration, memory, mood, etc.) CURRENTLY?: Somewhat  Total Score: 14  Interpretation: Subthreshold insomnia     On vistaril    Orders:  -     Polysomnogram (CPAP will be added if patient meets diagnostic criteria.); Future    2. Hypertriglyceridemia    3. Severe obesity (BMI 35.0-39.9) with comorbidity  Assessment & Plan:  Patient would benefit from weight loss and has tried to set realistic goals to achieve success. Lifestyle changes were discussed on eating healthy, exercising at least 150 minutes weekly, and reducing sedentary behavior.   Discussed the risk factors associated with obesity: Arthritis/YUE/Diabetes/Fatty Liver/Cardiovascular disease/GERD/HTN/HLP.       4. Tobacco use    5. Delayed sleep phase syndrome  Assessment & Plan:  Delay bed time to 11 pm  Sleep diary    Orders:  -     Polysomnogram (CPAP will be added if patient meets diagnostic criteria.); Future    Differential: circadian disorders   Natural sleep time 11-12 MN       Follow up in about 3 months (around 6/13/2025), or delay bed time 11 pm, sleep diary, SDI, PSG.    This note was prepared using voice recognition system and is likely to have sound alike errors that may have been overlooked even after proof reading.  Please call me with any questions    Discussed diagnosis, its evaluation, treatment and usual course. All questions answered.    The patient was given open opportunity to ask questions and/or express concerns about treatment plan.   All questions/concerns were discussed.       Two patient identifiers used prior to evaluation.     Thank you for the courtesy of participating  in the care of this patient    Ken Arana MD      Personal Diagnostic Review  []  CXR    []  ECHO    []  ONSAT    []  6MWD    []  LABS    []  CHEST CT    []  PET CT    []  Biopsy results          New          [1]   Social History  Tobacco Use   Smoking Status Former    Current packs/day: 0.50    Average packs/day: 0.5 packs/day for 16.2 years (8.1 ttl pk-yrs)    Types: Vaping with nicotine, Cigarettes    Start date: 1/1/2009   Smokeless Tobacco Current    Types: Snuff   Tobacco Comments    currently quitting   [2]   Outpatient Encounter Medications as of 3/13/2025   Medication Sig Dispense Refill    acetaminophen (TYLENOL) 500 MG tablet Take 1 tablet (500 mg total) by mouth every 6 (six) hours as needed for Pain or Temperature greater than (100.5). Note do not exceed >3000mg tylenol/acetaminophen in 24hr period. 50 tablet 0    hydrOXYzine pamoate (VISTARIL) 25 MG Cap Take 1-2 capsules (25-50 mg total) by mouth nightly as needed (for Insomnia). 60 capsule 0    ketoconazole (NIZORAL) 2 % shampoo Apply 1 Application topically twice a week.      omeprazole (PRILOSEC) 20 MG capsule Take 1 capsule (20 mg total) by mouth once daily. 31 each 11    ondansetron (ZOFRAN-ODT) 4 MG TbDL Dissolve 1 tablet (4 mg total) by mouth every 8 (eight) hours as needed (nausea/vomiting). 20 tablet 0    oxyCODONE (ROXICODONE) 5 MG immediate release tablet Take 1 tablet (5 mg total) by mouth every 6 (six) hours as needed for Pain (pain refractory to over the counter meds). 20 tablet 0     No facility-administered encounter medications on file as of 3/13/2025.   [3]   Current Outpatient Medications on File Prior to Visit   Medication Sig Dispense Refill    acetaminophen (TYLENOL) 500 MG tablet Take 1 tablet (500 mg total) by mouth every 6 (six) hours as needed for Pain or Temperature greater than (100.5). Note do not exceed >3000mg tylenol/acetaminophen in 24hr period. 50 tablet 0    hydrOXYzine pamoate (VISTARIL) 25 MG Cap Take 1-2  capsules (25-50 mg total) by mouth nightly as needed (for Insomnia). 60 capsule 0    ketoconazole (NIZORAL) 2 % shampoo Apply 1 Application topically twice a week.      omeprazole (PRILOSEC) 20 MG capsule Take 1 capsule (20 mg total) by mouth once daily. 31 each 11    ondansetron (ZOFRAN-ODT) 4 MG TbDL Dissolve 1 tablet (4 mg total) by mouth every 8 (eight) hours as needed (nausea/vomiting). 20 tablet 0    oxyCODONE (ROXICODONE) 5 MG immediate release tablet Take 1 tablet (5 mg total) by mouth every 6 (six) hours as needed for Pain (pain refractory to over the counter meds). 20 tablet 0     No current facility-administered medications on file prior to visit.

## 2025-03-13 ENCOUNTER — OFFICE VISIT (OUTPATIENT)
Dept: SLEEP MEDICINE | Facility: CLINIC | Age: 40
End: 2025-03-13
Payer: COMMERCIAL

## 2025-03-13 ENCOUNTER — PATIENT MESSAGE (OUTPATIENT)
Dept: SLEEP MEDICINE | Facility: CLINIC | Age: 40
End: 2025-03-13

## 2025-03-13 VITALS
DIASTOLIC BLOOD PRESSURE: 80 MMHG | BODY MASS INDEX: 39.28 KG/M2 | HEIGHT: 72 IN | SYSTOLIC BLOOD PRESSURE: 130 MMHG | WEIGHT: 290 LBS

## 2025-03-13 DIAGNOSIS — Z72.0 TOBACCO USE: ICD-10-CM

## 2025-03-13 DIAGNOSIS — E78.1 HYPERTRIGLYCERIDEMIA: ICD-10-CM

## 2025-03-13 DIAGNOSIS — G47.00 INSOMNIA, UNSPECIFIED TYPE: Primary | ICD-10-CM

## 2025-03-13 DIAGNOSIS — E66.01 SEVERE OBESITY (BMI 35.0-39.9) WITH COMORBIDITY: ICD-10-CM

## 2025-03-13 DIAGNOSIS — G47.21 DELAYED SLEEP PHASE SYNDROME: ICD-10-CM

## 2025-03-13 NOTE — ASSESSMENT & PLAN NOTE
Please rate the CURRENT (i.e. LAST 2 WEEKS) SEVERITY of your insomnia problem(s).  1. Difficulty falling asleep: Very severe  2. Difficulty staying asleep: None  3. Problem waking up too early: None  4. How SATISFIED/DISSATISFIED are you with your CURRENT sleep pattern?: Very dissatisfied  5. How NOTICEABLE to others do you think your sleep problem is in terms of impairing the quality of your life?: Somewhat  6.  How WORRIED/DISTRESSED are you about your current sleep problem?: Somewhat  7. To what extent do you consider your sleep problem to INTERFERE with your daily functioning (e.g. daytime fatigue, mood, ability to function at work/daily chores, concentration, memory, mood, etc.) CURRENTLY?: Somewhat  Total Score: 14  Interpretation: Subthreshold insomnia     On vistaril

## 2025-03-13 NOTE — ASSESSMENT & PLAN NOTE
Patient would benefit from weight loss and has tried to set realistic goals to achieve success. Lifestyle changes were discussed on eating healthy, exercising at least 150 minutes weekly, and reducing sedentary behavior.   Discussed the risk factors associated with obesity: Arthritis/YUE/Diabetes/Fatty Liver/Cardiovascular disease/GERD/HTN/HLP.

## 2025-03-24 DIAGNOSIS — G47.21 DELAYED SLEEP PHASE SYNDROME: ICD-10-CM

## 2025-03-24 DIAGNOSIS — G47.00 INSOMNIA, UNSPECIFIED TYPE: Primary | ICD-10-CM

## 2025-03-24 NOTE — PROGRESS NOTES
Orders Placed This Encounter   Procedures    Home Sleep Study     Standing Status:   Future     Expiration Date:   3/24/2026

## 2025-04-17 ENCOUNTER — HOSPITAL ENCOUNTER (OUTPATIENT)
Dept: SLEEP MEDICINE | Facility: HOSPITAL | Age: 40
Discharge: HOME | End: 2025-04-17
Attending: INTERNAL MEDICINE
Payer: COMMERCIAL

## 2025-04-17 ENCOUNTER — RESULTS FOLLOW-UP (OUTPATIENT)
Dept: SLEEP MEDICINE | Facility: CLINIC | Age: 40
End: 2025-04-17

## 2025-04-17 DIAGNOSIS — G47.21 DELAYED SLEEP PHASE SYNDROME: ICD-10-CM

## 2025-04-17 DIAGNOSIS — G47.33 OSA (OBSTRUCTIVE SLEEP APNEA): Primary | ICD-10-CM

## 2025-04-17 DIAGNOSIS — G47.00 INSOMNIA, UNSPECIFIED TYPE: ICD-10-CM

## 2025-04-17 PROCEDURE — 95806 SLEEP STUDY UNATT&RESP EFFT: CPT | Performed by: INTERNAL MEDICINE

## 2025-04-17 NOTE — PROCEDURES
PHYSICIAN INTERPRETATION AND COMMENTS: Findings are consistent with moderate, positional obstructive sleep  apnea(YUE).  CLINICAL HISTORY: 39 year old male presented with: 19.75 inch neck, BMI of 39.3, an [place] sleepiness score of 0, no comorbidities  and symptoms of nocturnal witnessed apneas. Based on the clinical history, the patient has a high pre-test  probability of having Severe YUE.  SLEEP STUDY FINDINGS: Patient underwent a 1 night Home Sleep Test and by behavioral criteria, slept for approximately  6.78 hours, with a sleep latency of 6 minutes and a sleep efficiency of 98%. Mild sleep disordered breathing (AHI=14) is  noted based on a 4% hypopnea desaturation criteria, predominantly in the supine position (29 events/hour). The patient  slept supine 39.6% of the night based on valid recording time of 6.62 hours and is 7.3 times as likely to have  apneas/hypopneas when supine. When considering more subtle measures of sleep disordered breathing, the overall  respiratory disturbance index is moderate(RDI=28) based on a 1% hypopnea desaturation criteria with confirmation by  surrogate arousal indicators. The apneas/hypopneas are accompanied by minimal oxygen desaturation (percent time  below 90% SpO2: 0%, Min SpO2: 89.3%). The average desaturation across all sleep disordered breathing events is 2.9%.  Snoring occurs for 54.6% (30 dB) of the study, 46.2% is very loud. The mean pulse rate is 72.6 BPM, with frequent pulse rate  variability (55 events with >= 6 BPM increase/decrease per hour).  TREATMENT CONSIDERATIONS: Consider nasal continuous positive airway pressure (CPAP/AutoPAP) as the initial  treatment choice based on the RDI severity. A mandibular advancement splint (MAS) or referral to an ENT surgeon for  modification to the airway should be considered to reduce the potential risk of hypertension, cardiovascular disease,  stroke and diabetes if the CPAP trial is unsuccessful or the patient prefers an  "alternative therapy. Based on the YUE Supine  data in the study, a Mandibular Advancement Splint (MAS) will likely provide treatment benefit independent of YUE  severity. The patient should avoid sleeping supine; the non-supine AHI is 7.3 times less severe than the supine AHI.  DISEASE MANAGEMENT CONSIDERATIONS: Insomnia is a symptom that can be associated with untreated YUE. Sleeping  pills may increase the severity of untreated YUE and their use should be reevaluated once the YUE is treated.      Dear Ken Arana MD  [id number] [address] [facility]  [place] [id number]/Quinton Carrasco MD         The sleep study that you ordered is complete.  You have ordered sleep LAB services to perform the sleep study for Nato Ng .      Please find Sleep Study result in  the "Media tab" of Chart Review menu.        You can look  for the report in the  Media by the document type "Sleep Study Documents". Alphabetizing  "Document type" column helps to find the SLEEP STUDY report  Faster.       As the ordering provider, you are responsible for reviewing the results and implementing a treatment plan with your patient.    If you need a Sleep Medicine provider to explain the sleep study findings and arrange treatment for the patient, please refer patient for consultation to our Sleep Clinic via [facility] with Ambulatory Consult Sleep.     To do that please place an order for an  "Ambulatory Consult Sleep" -  order , it will go to our clinic work queue for our staff  to contact the patient for an appointment.      For any questions, please contact our sleep lab  staff at [phone] to talk to clinical staff          Ken Arana MD   "

## 2025-04-22 DIAGNOSIS — Z00.00 ROUTINE GENERAL MEDICAL EXAMINATION AT A HEALTH CARE FACILITY: Primary | ICD-10-CM

## 2025-04-25 DIAGNOSIS — Z00.00 ROUTINE GENERAL MEDICAL EXAMINATION AT A HEALTH CARE FACILITY: Primary | ICD-10-CM

## 2025-05-08 ENCOUNTER — TELEPHONE (OUTPATIENT)
Dept: PULMONOLOGY | Facility: CLINIC | Age: 40
End: 2025-05-08
Payer: COMMERCIAL

## 2025-05-08 ENCOUNTER — OFFICE VISIT (OUTPATIENT)
Dept: SLEEP MEDICINE | Facility: CLINIC | Age: 40
End: 2025-05-08
Payer: COMMERCIAL

## 2025-05-08 VITALS — WEIGHT: 290 LBS | BODY MASS INDEX: 39.28 KG/M2 | HEIGHT: 72 IN

## 2025-05-08 DIAGNOSIS — G47.21 DELAYED SLEEP PHASE SYNDROME: Primary | ICD-10-CM

## 2025-05-08 DIAGNOSIS — E78.1 HYPERTRIGLYCERIDEMIA: ICD-10-CM

## 2025-05-08 DIAGNOSIS — G47.00 INSOMNIA, UNSPECIFIED TYPE: ICD-10-CM

## 2025-05-08 DIAGNOSIS — Z72.0 TOBACCO USE: ICD-10-CM

## 2025-05-08 DIAGNOSIS — G47.33 OSA (OBSTRUCTIVE SLEEP APNEA): ICD-10-CM

## 2025-05-08 DIAGNOSIS — E66.01 SEVERE OBESITY (BMI 35.0-39.9) WITH COMORBIDITY: ICD-10-CM

## 2025-05-08 NOTE — PATIENT INSTRUCTIONS
CPAP:  Your Provider has placed an order for CPAP with [facility]). A respiratory therapist or other staff within [facility] will reach out to you to get more information and speak with you about insurance, costs and next steps. Please be advised that this call may come from a (953) area code. Please be sure to answer unfamiliar numbers as this may be [facility] and your order can not be processed until [facility] gotten in touch with you. If you have any questions or concerns please reach out to us. For specific CPAP equipment status update please reach out directly to [facility]. A contact list has been provided for you below.  [facility] 1st Contact : [phone]  [facility] Main Office: [phone]  Fax: [phone]

## 2025-05-08 NOTE — ASSESSMENT & PLAN NOTE
Treatment Options for Sleep Disordered Breathing discussed:     [x]    Continuous Positive Airway Pressure (CPAP).  []    Surgical options  []    Oral appliances   []    Behavioral approaches.   []    Weight loss.   []    Avoiding alcohol and sedative medication.  []    Treat other underlying medical conditions eg. nasal allergies  []     [employer]IRE

## 2025-05-08 NOTE — PROGRESS NOTES
The patient location is: [facility]  The chief complaint leading to consultation is:   Chief Complaint   Patient presents with    Sleep Apnea        Visit type: audiovisual    Face to Face time with patient: 6  mins  30 minutes of total time spent on the encounter, which includes face to face time and non-face to face time preparing to see the patient (eg, review of tests), Obtaining and/or reviewing separately obtained history, Documenting clinical information in the electronic or other health record, Independently interpreting results (not separately reported) and communicating results to the patient/family/caregiver, or Care coordination (not separately reported).         Each patient to whom he or she provides medical services by telemedicine is:  (1) informed of the relationship between the physician and patient and the respective role of any other health care provider with respect to management of the patient; and (2) notified that he or she may decline to receive medical services by telemedicine and may withdraw from such care at any time.    Notes:                                             Pulmonary Outpatient  Visit     Subjective:       Patient ID: Nato Ng is a 40 y.o. male.    Social History  Tobacco Use   Smoking Status Former    Current packs/day: 0.50    Average packs/day: 0.5 packs/day for 16.3 years (8.2 ttl pk-yrs)    Types: Vaping with nicotine, Cigarettes    Start date: 1/1/2009   Smokeless Tobacco Current    Types: Snuff   Tobacco Comments    currently quitting            Chief Complaint: Sleep Apnea          Nato Ng is 39 y.o.  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       Encounter Medications[1]        Pertinent Work Up:      Pulmonary Interventions:      Smoking hx:    Environmental/Occupational hx:        Interval Hx:           The following portions of the patient's history were reviewed and updated as appropriate: He  has a past medical history of Bulging lumbar disc, Fatigue (4/6/2018), Insomnia (4/6/2018), and Upper respiratory tract infection (8/20/2021).  He does not have any pertinent problems on file.  He  has a past surgical history that includes Leg Surgery; Spinal fusion (11/2019); Fracture surgery (1997); and Thyroidectomy (Left, 2/11/2025).  His family history includes Arthritis in his maternal grandfather; Diabetes in his maternal grandfather.  He  reports that he has quit smoking. His smoking use included vaping with nicotine and cigarettes. He started smoking about 16 years ago. He has a 8.2 pack-year smoking history. His smokeless tobacco use includes snuff. He reports that he does not drink alcohol and does not use drugs.  He has a current medication list which includes the following prescription(s): acetaminophen, hydroxyzine pamoate, ketoconazole, omeprazole, ondansetron, and oxycodone.  Medications Ordered Prior to Encounter[2]  He is allergic to penicillins..      BP Readings from Last 3 Encounters:   03/13/25 130/80   02/11/25 133/66   12/10/24 138/80     Snoring / Sleep:     [place] Questionnaire (validated YUE screening questionnaire)    YES -- Snoring/apnea    YES -- Fatigue    Body mass index is 39.33 kg/m².  (>25 is overweight, >30 is obese)    Blood Pressure = normal blood pressure  (PreHTN 120-139/80-89, Stg1 140-159/90-99, Stg2 >160/>100)  [place] = 2 of three YUE  categories are positive (high risk is 2-3 positive categories)     [place] Sleepiness Scale TOTAL =          5/8/2025   EPWORTH SLEEPINESS SCALE   Sitting and reading 0   Watching TV 0   Sitting, inactive in a public place (e.g. a theatre or a meeting) 0   As a passenger in a car for an hour without a break 0   Lying down to rest in the afternoon when circumstances permit 1   Sitting and talking to someone 0   Sitting quietly after a lunch without alcohol 0   In a car, while stopped for a few minutes in traffic 0   Total score 1      (validated sleepiness questionnaire with a higher score indicating greater sleepiness; range 0-24)  Failed to redirect to the Timeline version of the [employer].    STOP-Bang Questionnaire (validated YUE screening questionnaire)  Negative unless checked off.  [] Snoring    [x]  Tired/Fatigued/Sleepy  [] Obstruction (apneas/choking)  [] Pressure (HTN)  [x] BMI >35  [] Age >50  [] Neck >40 cm  [x] Gender male   STOP-Bang = 3 (low risk 0-2,high risk 3-8)       MMRC Dyspnea Scale (4 is worst)     [] MMRC 0: Dyspneic on strenuous excercise (0 points)    [] MMRC 1: Dyspneic on walking a slight hill (0 points)    [] MMRC 2: Dyspneic on walking level ground; must stop occasionally due to breathlessness (1 point)    [] MMRC 3: Must stop for breathlessness after walking 100 yards or after a few minutes (2 points)    [] MMRC 4: Cannot leave house; breathless on dressing/undressing (3 points)                       5/8/2025   EPWORTH SLEEPINESS SCALE   Sitting and reading 0   Watching TV 0   Sitting, inactive in a public place (e.g. a theatre or a meeting) 0   As a passenger in a car for an hour without a break 0   Lying down to rest in the afternoon when circumstances permit 1   Sitting and talking to someone 0   Sitting quietly after a lunch without alcohol 0   In a car, while stopped for a few minutes in traffic 0   Total score 1            4/6/2018    11:00 AM   EPWORTH SLEEPINESS SCALE    Sitting and reading 0   Watching TV 0   Sitting, inactive in a public place (e.g. a theatre or a meeting) 0   As a passenger in a car for an hour without a break 0   Lying down to rest in the afternoon when circumstances permit 0   Sitting and talking to someone 0   Sitting quietly after a lunch without alcohol 0   In a car, while stopped for a few minutes in traffic 0   Total score 0                 Objective:     Vital Signs (Most Recent)  Height and Weight  Height: 6' (182.9 cm)  Weight: 131.5 kg (290 lb)  BSA (Calculated - sq m): 2.58 sq meters  BMI (Calculated): 39.3  Weight in (lb) to have BMI = 25: 183.9]  Wt Readings from Last 2 Encounters:   05/08/25 131.5 kg (290 lb)   03/13/25 131.5 kg (290 lb)       Physical Exam  Vitals and nursing note reviewed.   Constitutional:       Appearance: Normal appearance. He is obese.   HENT:      Head: Normocephalic and atraumatic.   Eyes:      Pupils: Pupils are equal, round, and reactive to light.   Neurological:      General: No focal deficit present.      Mental Status: He is alert and oriented to person, place, and time.          Laboratory  Lab Results   Component Value Date    WBC 6.02 12/10/2024    RBC 5.67 12/10/2024    HGB 16.1 12/10/2024    HCT 47.6 12/10/2024    MCV 84 12/10/2024    MCH 28.4 12/10/2024    MCHC 33.8 12/10/2024    RDW 13.3 12/10/2024     12/10/2024    MPV 8.9 (L) 12/10/2024    GRAN 3.2 04/03/2023    GRAN 55.6 04/03/2023    LYMPH 2.0 04/03/2023    LYMPH 34.5 04/03/2023    MONO 0.4 04/03/2023    MONO 7.2 04/03/2023    EOS 0.1 04/03/2023    BASO 0.04 04/03/2023    EOSINOPHIL 1.6 04/03/2023    BASOPHIL 0.7 04/03/2023       BMP  Lab Results   Component Value Date     12/10/2024    K 4.1 12/10/2024     12/10/2024    CO2 25 12/10/2024    BUN 13 12/10/2024    CREATININE 1.1 12/10/2024    CALCIUM 9.7 12/10/2024    ANIONGAP 10 12/10/2024    ESTGFRAFRICA >60 08/25/2021    EGFRNONAA >60 08/25/2021    AST 18 12/10/2024    ALT 40 12/10/2024  "   PROT 7.7 12/10/2024          No results found for: "IGE"     No results found for: "ASPERGILLUS"  No results found for: "AFUMIGATUSCL"     No results found for: "ACE"     Diagnostic Results:  I have personally reviewed today the following studies:    US FNA Thyroid, 1st Lesion  Narrative: EXAMINATION:  US FINE NEEDLE ASPIRATION THYROID, FIRST LESION    CLINICAL HISTORY:  Nontoxic single thyroid nodule    TECHNIQUE:  Real-time ultrasonography was utilized to perform a fine needle aspiration.  Grayscale and color Doppler spot images were obtained.    COMPARISON:  Ultrasound thyroid dated January 3, 2025    FINDINGS:  The patient was informed of the risks, benefits, and alternatives to the procedure and all questions were answered.  The patient demonstrated verbal understanding and signed the informed consent.  A time-out was performed.    The patient was then prepped and draped in a sterile fashion and local anesthesia was achieved via a 1% lidocaine solution.    Using sonographic guidance, a 22-gauge needle was then inserted into the left thyroid nodule and a sample was returned. Four passes were made.  The patient tolerated the procedure without an immediate complication.  Impression: Ultrasound guided fine-needle aspiration of a left thyroid nodule.    Electronically signed by: Avery Costa  Date:    01/27/2025  Time:    14:44  CT Neck Chest With Contrast (XPD)  Narrative: EXAMINATION:  CT NECK CHEST WITH CONTRAST (XPD)    CLINICAL HISTORY:  Nontoxic goiter, unspecifiedthyroid goiter, surgical planning;    COMPARISON:  Thyroid ultrasound January 3, 2025.    TECHNIQUE:  Axial CT images were obtained of the NECK and chest following administration of IV contrast.  Iterative reconstruction technique was used. The CT exam was performed using one or more of the following dose reduction techniques- Automated exposure control, adjustment of the mA and/or kV according to patient size, and/or use of iterative " reconstructed technique.    FINDINGS:  No acute finding in the visualized intracranial regions.  Paranasal sinuses are clear.  Orbits are unremarkable.  No cervical lymphadenopathy.    Markedly enlarged heterogeneous left thyroid gland measures 8.1 x 7.2 x 4.2 cm, as seen on the comparison ultrasound.  This lesion a displaces and severely narrows the traversing left internal jugular vein.  This lesion also a displaces the trachea to the right with mild-to-moderate tracheal narrowing.    Lungs are clear.  No pneumothorax or pleural effusion.  Heart size is normal.  Coronary artery calcifications are present.  No acute osseous finding in the neck or chest.  No acute finding in the visualized upper abdomen.  Reversal the normal cervical spine lordosis which can be secondary to patient positioning or be secondary to muscle spasm.  Impression: 1. Large left thyroid gland/nodule, as above, with significant narrowing of the traversing left internal jugular vein.  The trachea is deviated to the right with mild to moderate narrowing.  2. Other findings as above.    Electronically signed by: Avery Costa  Date:    01/27/2025  Time:    13:59     PHYSICIAN INTERPRETATION AND COMMENTS: Findings are consistent with moderate, positional obstructive sleep  apnea(YUE).  CLINICAL HISTORY: 39 year old male presented with: 19.75 inch neck, BMI of 39.3, an [place] sleepiness score of 0, no comorbidities  and symptoms of nocturnal witnessed apneas. Based on the clinical history, the patient has a high pre-test  probability of having Severe YUE.  SLEEP STUDY FINDINGS: Patient underwent a 1 night Home Sleep Test and by behavioral criteria, slept for approximately  6.78 hours, with a sleep latency of 6 minutes and a sleep efficiency of 98%. Mild sleep disordered breathing (AHI=14) is  noted based on a 4% hypopnea desaturation criteria, predominantly in the supine position (29 events/hour). The patient  slept supine 39.6% of the night  "based on valid recording time of 6.62 hours and is 7.3 times as likely to have  apneas/hypopneas when supine. When considering more subtle measures of sleep disordered breathing, the overall  respiratory disturbance index is moderate(RDI=28) based on a 1% hypopnea desaturation criteria with confirmation by  surrogate arousal indicators. The apneas/hypopneas are accompanied by minimal oxygen desaturation (percent time  below 90% SpO2: 0%, Min SpO2: 89.3%). The average desaturation across all sleep disordered breathing events is 2.9%.  Snoring occurs for 54.6% (30 dB) of the study, 46.2% is very loud. The mean pulse rate is 72.6 BPM, with frequent pulse rate  variability (55 events with >= 6 BPM increase/decrease per hour).  TREATMENT CONSIDERATIONS: Consider nasal continuous positive airway pressure (CPAP/AutoPAP) as the initial  treatment choice based on the RDI severity. A mandibular advancement splint (MAS) or referral to an ENT surgeon for  modification to the airway should be considered to reduce the potential risk of hypertension, cardiovascular disease,  stroke and diabetes if the CPAP trial is unsuccessful or the patient prefers an alternative therapy. Based on the YUE Supine  data in the study, a Mandibular Advancement Splint (MAS) will likely provide treatment benefit independent of YUE  severity. The patient should avoid sleeping supine; the non-supine AHI is 7.3 times less severe than the supine AHI.  DISEASE MANAGEMENT CONSIDERATIONS: Insomnia is a symptom that can be associated with untreated YUE. Sleeping  pills may increase the severity of untreated YUE and their use should be reevaluated once the YUE is treated.      Dear No referring provider defined for this encounter./Quinton Carrasco MD         The sleep study that you ordered is complete.  You have ordered sleep LAB services to perform the sleep study for Nato Ng .      Please find Sleep Study result in  the "Media tab" of Chart " "Review menu.        You can look  for the report in the  Media by the document type "Sleep Study Documents". Alphabetizing  "Document type" column helps to find the SLEEP STUDY report  Faster.       As the ordering provider, you are responsible for reviewing the results and implementing a treatment plan with your patient.    If you need a Sleep Medicine provider to explain the sleep study findings and arrange treatment for the patient, please refer patient for consultation to our Sleep Clinic via [facility] with Ambulatory Consult Sleep.     To do that please place an order for an  "Ambulatory Consult Sleep" -  order , it will go to our clinic work queue for our staff  to contact the patient for an appointment.      For any questions, please contact our sleep lab  staff at [phone] to talk to clinical staff          Ken Arana MD       Assessment/Plan:          1. Delayed sleep phase syndrome    2. Tobacco use    3. Insomnia, unspecified type  Overview:  Chronic, Stable, cont trazodone      4. Hypertriglyceridemia    5. Severe obesity (BMI 35.0-39.9) with comorbidity    6. YUE (obstructive sleep apnea)  Assessment & Plan:  Treatment Options for Sleep Disordered Breathing discussed:     [x]    Continuous Positive Airway Pressure (CPAP).  []    Surgical options  []    Oral appliances   []    Behavioral approaches.   []    Weight loss.   []    Avoiding alcohol and sedative medication.  []    Treat other underlying medical conditions eg. nasal allergies  []     INSPIRE       Orders:  -     CPAP FOR HOME USE    Differential: circadian disorders   Natural sleep time 11-12 MN      Discussed therapeutic goals for positive airway pressure therapy(CPAP or BiPAP):   Ideal is usage 100% of nights for 6 - 8 hours per night.   Minimum usage is 70% of night for at least 4 hours per night used.  Mask choices discussed.  Patient expressed understanding. All Questions answered.      Follow up in about 10 weeks (around " 7/17/2025), or cpap orders.    This note was prepared using voice recognition system and is likely to have sound alike errors that may have been overlooked even after proof reading.  Please call me with any questions    Discussed diagnosis, its evaluation, treatment and usual course. All questions answered.    The patient was given open opportunity to ask questions and/or express concerns about treatment plan.   All questions/concerns were discussed.       Two patient identifiers used prior to evaluation.     Thank you for the courtesy of participating in the care of this patient    Ken Arana MD      Personal Diagnostic Review  []  CXR    []  ECHO    []  ONSAT    []  6MWD    []  LABS    []  CHEST CT    []  PET CT    []  Biopsy results          New            [1]   Outpatient Encounter Medications as of 5/8/2025   Medication Sig Dispense Refill    acetaminophen (TYLENOL) 500 MG tablet Take 1 tablet (500 mg total) by mouth every 6 (six) hours as needed for Pain or Temperature greater than (100.5). Note do not exceed >3000mg tylenol/acetaminophen in 24hr period. 50 tablet 0    hydrOXYzine pamoate (VISTARIL) 25 MG Cap Take 1-2 capsules (25-50 mg total) by mouth nightly as needed (for Insomnia). 60 capsule 0    ketoconazole (NIZORAL) 2 % shampoo Apply 1 Application topically twice a week.      omeprazole (PRILOSEC) 20 MG capsule Take 1 capsule (20 mg total) by mouth once daily. 31 each 11    ondansetron (ZOFRAN-ODT) 4 MG TbDL Dissolve 1 tablet (4 mg total) by mouth every 8 (eight) hours as needed (nausea/vomiting). 20 tablet 0    oxyCODONE (ROXICODONE) 5 MG immediate release tablet Take 1 tablet (5 mg total) by mouth every 6 (six) hours as needed for Pain (pain refractory to over the counter meds). 20 tablet 0     No facility-administered encounter medications on file as of 5/8/2025.   [2]   Current Outpatient Medications on File Prior to Visit   Medication Sig Dispense Refill    acetaminophen (TYLENOL) 500 MG  tablet Take 1 tablet (500 mg total) by mouth every 6 (six) hours as needed for Pain or Temperature greater than (100.5). Note do not exceed >3000mg tylenol/acetaminophen in 24hr period. 50 tablet 0    hydrOXYzine pamoate (VISTARIL) 25 MG Cap Take 1-2 capsules (25-50 mg total) by mouth nightly as needed (for Insomnia). 60 capsule 0    ketoconazole (NIZORAL) 2 % shampoo Apply 1 Application topically twice a week.      omeprazole (PRILOSEC) 20 MG capsule Take 1 capsule (20 mg total) by mouth once daily. 31 each 11    ondansetron (ZOFRAN-ODT) 4 MG TbDL Dissolve 1 tablet (4 mg total) by mouth every 8 (eight) hours as needed (nausea/vomiting). 20 tablet 0    oxyCODONE (ROXICODONE) 5 MG immediate release tablet Take 1 tablet (5 mg total) by mouth every 6 (six) hours as needed for Pain (pain refractory to over the counter meds). 20 tablet 0     No current facility-administered medications on file prior to visit.

## 2025-05-08 NOTE — TELEPHONE ENCOUNTER
----- Message from Tramaine Avila sent at 5/8/2025  4:30 PM CDT -----  Regarding: Crow Update  Order processed and pt scheduled for Monday.Appointment Booking DetailsScheduled Date & Time05/12/2025 - 02:15 pm  [id number] MinutesTherapistMcClay, [id number] [place] [address] [address] Medical [facility] Building, [address]  ----- Message -----  From: Lexus Mccollum, CRT  Sent: 5/8/2025   3:17 PM CDT  To: Tramaine Avila; Ken Arana MD    Thanks Dr. Lopez. Tramaine can you please expedite this patient.  ----- Message -----  From: Ken Arana MD  Sent: 5/8/2025   3:12 PM CDT  To: Jo Ann Simms    Expedite

## 2025-06-04 ENCOUNTER — CLINICAL SUPPORT (OUTPATIENT)
Dept: INTERNAL MEDICINE | Facility: CLINIC | Age: 40
End: 2025-06-04

## 2025-06-04 ENCOUNTER — RESULTS FOLLOW-UP (OUTPATIENT)
Dept: INTERNAL MEDICINE | Facility: CLINIC | Age: 40
End: 2025-06-04

## 2025-06-04 ENCOUNTER — HOSPITAL ENCOUNTER (OUTPATIENT)
Dept: CARDIOLOGY | Facility: HOSPITAL | Age: 40
Discharge: HOME | End: 2025-06-04

## 2025-06-04 ENCOUNTER — OFFICE VISIT (OUTPATIENT)
Dept: INTERNAL MEDICINE | Facility: CLINIC | Age: 40
End: 2025-06-04
Payer: COMMERCIAL

## 2025-06-04 ENCOUNTER — CLINICAL SUPPORT (OUTPATIENT)
Dept: INTERNAL MEDICINE | Facility: CLINIC | Age: 40
End: 2025-06-04
Payer: COMMERCIAL

## 2025-06-04 ENCOUNTER — CLINICAL SUPPORT (OUTPATIENT)
Dept: PULMONOLOGY | Facility: CLINIC | Age: 40
End: 2025-06-04

## 2025-06-04 VITALS
SYSTOLIC BLOOD PRESSURE: 149 MMHG | WEIGHT: 299 LBS | BODY MASS INDEX: 40.5 KG/M2 | HEIGHT: 72 IN | HEART RATE: 87 BPM | TEMPERATURE: 98 F | DIASTOLIC BLOOD PRESSURE: 83 MMHG

## 2025-06-04 DIAGNOSIS — Z00.00 ROUTINE GENERAL MEDICAL EXAMINATION AT A HEALTH CARE FACILITY: Primary | ICD-10-CM

## 2025-06-04 DIAGNOSIS — M47.816 LUMBAR SPONDYLOSIS: ICD-10-CM

## 2025-06-04 DIAGNOSIS — Z87.891 FORMER SMOKER: ICD-10-CM

## 2025-06-04 DIAGNOSIS — Z72.0 TOBACCO USE: ICD-10-CM

## 2025-06-04 DIAGNOSIS — Z00.00 ROUTINE GENERAL MEDICAL EXAMINATION AT A HEALTH CARE FACILITY: ICD-10-CM

## 2025-06-04 DIAGNOSIS — F51.02 ADJUSTMENT INSOMNIA: ICD-10-CM

## 2025-06-04 DIAGNOSIS — R73.03 PREDIABETES: ICD-10-CM

## 2025-06-04 DIAGNOSIS — Z98.1 HISTORY OF LUMBAR FUSION: ICD-10-CM

## 2025-06-04 DIAGNOSIS — E78.1 HYPERTRIGLYCERIDEMIA: ICD-10-CM

## 2025-06-04 DIAGNOSIS — F41.9 ANXIETY AND DEPRESSION: ICD-10-CM

## 2025-06-04 DIAGNOSIS — G47.33 OSA (OBSTRUCTIVE SLEEP APNEA): ICD-10-CM

## 2025-06-04 DIAGNOSIS — I10 PRIMARY HYPERTENSION: ICD-10-CM

## 2025-06-04 DIAGNOSIS — F32.A ANXIETY AND DEPRESSION: ICD-10-CM

## 2025-06-04 DIAGNOSIS — E66.01 MORBID OBESITY WITH BMI OF 40.0-44.9, ADULT: ICD-10-CM

## 2025-06-04 LAB
ALBUMIN SERPL BCP-MCNC: 3.9 G/DL (ref 3.5–5.2)
ALP SERPL-CCNC: 82 UNIT/L (ref 40–150)
ALT SERPL W/O P-5'-P-CCNC: 47 UNIT/L (ref 10–44)
ANION GAP (OHS): 9 MMOL/L (ref 8–16)
AST SERPL-CCNC: 24 UNIT/L (ref 11–45)
BILIRUB SERPL-MCNC: 0.7 MG/DL (ref 0.1–1)
BILIRUB UR QL STRIP.AUTO: NEGATIVE
BRPFT: NORMAL
BUN SERPL-MCNC: 11 MG/DL (ref 6–20)
CALCIUM SERPL-MCNC: 9.5 MG/DL (ref 8.7–10.5)
CHLORIDE SERPL-SCNC: 107 MMOL/L (ref 95–110)
CHOLEST SERPL-MCNC: 178 MG/DL (ref 120–199)
CHOLEST/HDLC SERPL: 5.4 {RATIO} (ref 2–5)
CLARITY UR: CLEAR
CO2 SERPL-SCNC: 24 MMOL/L (ref 23–29)
COLOR UR AUTO: YELLOW
CREAT SERPL-MCNC: 1 MG/DL (ref 0.5–1.4)
EAG (OHS): 114 MG/DL (ref 68–131)
ERYTHROCYTE [DISTWIDTH] IN BLOOD BY AUTOMATED COUNT: 13.9 % (ref 11.5–14.5)
FEF 25 75 LLN: 2.9
FEF 25 75 PRE REF: 87.1 %
FEF 25 75 REF: 4.61
FEV1 FVC LLN: 70
FEV1 FVC PRE REF: 100.8 %
FEV1 FVC REF: 81
FEV1 LLN: 3.61
FEV1 PRE REF: 97.7 %
FEV1 REF: 4.55
FVC LLN: 4.52
FVC PRE REF: 96.4 %
FVC REF: 5.66
GFR SERPLBLD CREATININE-BSD FMLA CKD-EPI: >60 ML/MIN/1.73/M2
GLUCOSE SERPL-MCNC: 96 MG/DL (ref 70–110)
GLUCOSE UR QL STRIP: NEGATIVE
HBA1C MFR BLD: 5.6 % (ref 4–5.6)
HCT VFR BLD AUTO: 45.8 % (ref 40–54)
HDLC SERPL-MCNC: 33 MG/DL (ref 40–75)
HDLC SERPL: 18.5 % (ref 20–50)
HGB BLD-MCNC: 15.5 GM/DL (ref 14–18)
HGB UR QL STRIP: NEGATIVE
HOLD SPECIMEN: NORMAL
KETONES UR QL STRIP: NEGATIVE
LDLC SERPL CALC-MCNC: 103 MG/DL (ref 63–159)
LEUKOCYTE ESTERASE UR QL STRIP: NEGATIVE
MCH RBC QN AUTO: 28.3 PG (ref 27–31)
MCHC RBC AUTO-ENTMCNC: 33.8 G/DL (ref 32–36)
MCV RBC AUTO: 84 FL (ref 82–98)
NITRITE UR QL STRIP: NEGATIVE
NONHDLC SERPL-MCNC: 145 MG/DL
OHS QRS DURATION: 100 MS
OHS QTC CALCULATION: 437 MS
PEF LLN: 8.22
PEF PRE REF: 93.7 %
PEF REF: 10.67
PH UR STRIP: 6 [PH]
PLATELET # BLD AUTO: 298 K/UL (ref 150–450)
PMV BLD AUTO: 9 FL (ref 9.2–12.9)
POTASSIUM SERPL-SCNC: 4.1 MMOL/L (ref 3.5–5.1)
PRE FEF 25 75: 4.02 L/S (ref 2.9–6.32)
PRE FET 100: 8.58 SEC
PRE FEV1 FVC: 81.4 % (ref 70.35–89.56)
PRE FEV1: 4.45 L (ref 3.61–5.46)
PRE FVC: 5.46 L (ref 4.52–6.82)
PRE PEF: 9.99 L/S (ref 8.22–13.12)
PROT SERPL-MCNC: 7.8 GM/DL (ref 6–8.4)
PROT UR QL STRIP: NEGATIVE
RBC # BLD AUTO: 5.47 M/UL (ref 4.6–6.2)
SODIUM SERPL-SCNC: 140 MMOL/L (ref 136–145)
SP GR UR STRIP: 1.02
TRIGL SERPL-MCNC: 210 MG/DL (ref 30–150)
WBC # BLD AUTO: 6.46 K/UL (ref 3.9–12.7)

## 2025-06-04 PROCEDURE — 3008F BODY MASS INDEX DOCD: CPT | Mod: CPTII,S$GLB,, | Performed by: FAMILY MEDICINE

## 2025-06-04 PROCEDURE — 80061 LIPID PANEL: CPT

## 2025-06-04 PROCEDURE — 93005 ELECTROCARDIOGRAM TRACING: CPT

## 2025-06-04 PROCEDURE — 99199 UNLISTED SPECIAL SVC PX/RPRT: CPT | Mod: S$GLB,,, | Performed by: INTERNAL MEDICINE

## 2025-06-04 PROCEDURE — 85027 COMPLETE CBC AUTOMATED: CPT

## 2025-06-04 PROCEDURE — 1159F MED LIST DOCD IN RCRD: CPT | Mod: CPTII,S$GLB,, | Performed by: FAMILY MEDICINE

## 2025-06-04 PROCEDURE — 81003 URINALYSIS AUTO W/O SCOPE: CPT

## 2025-06-04 PROCEDURE — 94010 BREATHING CAPACITY TEST: CPT | Mod: ,,, | Performed by: INTERNAL MEDICINE

## 2025-06-04 PROCEDURE — 83655 ASSAY OF LEAD: CPT

## 2025-06-04 PROCEDURE — 3079F DIAST BP 80-89 MM HG: CPT | Mod: CPTII,S$GLB,, | Performed by: FAMILY MEDICINE

## 2025-06-04 PROCEDURE — 1160F RVW MEDS BY RX/DR IN RCRD: CPT | Mod: CPTII,S$GLB,, | Performed by: FAMILY MEDICINE

## 2025-06-04 PROCEDURE — 99999 PR PBB SHADOW E&M-EST. PATIENT-LVL III: CPT | Mod: PBBFAC,,, | Performed by: FAMILY MEDICINE

## 2025-06-04 PROCEDURE — 99385 PREV VISIT NEW AGE 18-39: CPT | Mod: S$GLB,,, | Performed by: FAMILY MEDICINE

## 2025-06-04 PROCEDURE — 3044F HG A1C LEVEL LT 7.0%: CPT | Mod: CPTII,S$GLB,, | Performed by: FAMILY MEDICINE

## 2025-06-04 PROCEDURE — 3077F SYST BP >= 140 MM HG: CPT | Mod: CPTII,S$GLB,, | Performed by: FAMILY MEDICINE

## 2025-06-04 PROCEDURE — 80053 COMPREHEN METABOLIC PANEL: CPT

## 2025-06-04 PROCEDURE — 93010 ELECTROCARDIOGRAM REPORT: CPT | Mod: ,,, | Performed by: INTERNAL MEDICINE

## 2025-06-04 PROCEDURE — 83036 HEMOGLOBIN GLYCOSYLATED A1C: CPT

## 2025-06-04 RX ORDER — SEMAGLUTIDE 0.25 MG/.5ML
0.25 INJECTION, SOLUTION SUBCUTANEOUS
Qty: 2 ML | Refills: 0 | Status: ACTIVE | OUTPATIENT
Start: 2025-06-04

## 2025-06-06 LAB — LEAD BLDV-MCNC: <1 MCG/DL

## 2025-07-29 ENCOUNTER — PATIENT MESSAGE (OUTPATIENT)
Dept: ADMINISTRATIVE | Facility: HOSPITAL | Age: 40
End: 2025-07-29
Payer: COMMERCIAL

## 2025-08-29 ENCOUNTER — PATIENT MESSAGE (OUTPATIENT)
Dept: ADMINISTRATIVE | Facility: HOSPITAL | Age: 40
End: 2025-08-29
Payer: COMMERCIAL

## 2025-09-04 ENCOUNTER — OFFICE VISIT (OUTPATIENT)
Dept: SLEEP MEDICINE | Facility: CLINIC | Age: 40
End: 2025-09-04
Payer: COMMERCIAL

## 2025-09-04 VITALS — BODY MASS INDEX: 37.93 KG/M2 | HEIGHT: 72 IN | WEIGHT: 280 LBS

## 2025-09-04 DIAGNOSIS — G47.33 OSA (OBSTRUCTIVE SLEEP APNEA): ICD-10-CM

## 2025-09-04 DIAGNOSIS — E66.01 MORBID OBESITY WITH BMI OF 40.0-44.9, ADULT: ICD-10-CM

## 2025-09-04 DIAGNOSIS — F51.02 ADJUSTMENT INSOMNIA: Primary | ICD-10-CM

## 2025-09-04 DIAGNOSIS — R73.03 PREDIABETES: ICD-10-CM

## 2025-09-04 DIAGNOSIS — E78.1 HYPERTRIGLYCERIDEMIA: ICD-10-CM

## 2025-09-04 DIAGNOSIS — G47.21 DELAYED SLEEP PHASE SYNDROME: ICD-10-CM

## 2025-09-04 DIAGNOSIS — I10 PRIMARY HYPERTENSION: ICD-10-CM

## 2025-09-04 PROCEDURE — 1160F RVW MEDS BY RX/DR IN RCRD: CPT | Mod: CPTII,95,, | Performed by: INTERNAL MEDICINE

## 2025-09-04 PROCEDURE — 3044F HG A1C LEVEL LT 7.0%: CPT | Mod: CPTII,95,, | Performed by: INTERNAL MEDICINE

## 2025-09-04 PROCEDURE — 1159F MED LIST DOCD IN RCRD: CPT | Mod: CPTII,95,, | Performed by: INTERNAL MEDICINE

## 2025-09-04 PROCEDURE — 98006 SYNCH AUDIO-VIDEO EST MOD 30: CPT | Mod: 95,,, | Performed by: INTERNAL MEDICINE

## 2025-09-04 RX ORDER — TEMAZEPAM 7.5 MG/1
7.5 CAPSULE ORAL NIGHTLY
Qty: 31 CAPSULE | Refills: 2 | Status: SHIPPED | OUTPATIENT
Start: 2025-09-04 | End: 2025-12-03

## (undated) DEVICE — APPLIER LIGACLIP SM 9.38IN

## (undated) DEVICE — FIBRILLAR ABS HEMOSTAT 4X4

## (undated) DEVICE — CLIP MED TICALL

## (undated) DEVICE — DRAIN CHANNEL ROUND 15FR

## (undated) DEVICE — ELECTRODE BLADE INSULATED 1 IN

## (undated) DEVICE — DRAPE HALF SURGICAL 40X58IN

## (undated) DEVICE — CLIP LIGATION MEDIUM CLIPS 1

## (undated) DEVICE — SUT LIGACLIP SMALL XTRA

## (undated) DEVICE — SUT 5/0 18IN PLAIN FAST AB

## (undated) DEVICE — PROBE SIMULATOR KRAFF

## (undated) DEVICE — HOOK LONE STAR BLUNT 12MM

## (undated) DEVICE — TRAY SKIN SCRUB WET PREMIUM

## (undated) DEVICE — GOWN SURGICAL X-LARGE

## (undated) DEVICE — ELECTRODE MEGADYNE RETURN DUAL

## (undated) DEVICE — DRAPE CORETEMP FLD WRM 56X62IN

## (undated) DEVICE — TRAY MINOR GEN SURG OMC

## (undated) DEVICE — STAPLER SKIN PROXIMATE WIDE

## (undated) DEVICE — CORD BIPOLAR 12 FOOT

## (undated) DEVICE — MARKER FN REG DUAL UTIL RULER

## (undated) DEVICE — SUT 2-0 12-18IN SILK

## (undated) DEVICE — DRAPE INSTR MAGNETIC 10X16IN

## (undated) DEVICE — NDL HYPO REG 25G X 1 1/2

## (undated) DEVICE — SUT 3-0 12-18IN SILK

## (undated) DEVICE — DRESSING TRANS 4X4 TEGADERM

## (undated) DEVICE — TUBE EMG NIM 7.0MM TRIVANTAGE

## (undated) DEVICE — SUT SILK 2-0 PS 18IN BLACK

## (undated) DEVICE — DRAPE EENT SPLIT STERILE

## (undated) DEVICE — EVACUATOR WOUND BULB 100CC

## (undated) DEVICE — GOWN NONREINF SET-IN SLV 2XL

## (undated) DEVICE — SPONGE LAP 18X18 PREWASHED

## (undated) DEVICE — GAUZE SPONGE PEANUT STRL

## (undated) DEVICE — BLADE SURG #15 CARBON STEEL

## (undated) DEVICE — CONTAINER SPECIMEN OR STER 4OZ

## (undated) DEVICE — SUT CTD VICRYL 3-0 CR/SH

## (undated) DEVICE — TOWEL OR DISP STRL BLUE 4/PK